# Patient Record
Sex: FEMALE | Race: WHITE | NOT HISPANIC OR LATINO | Employment: FULL TIME | URBAN - METROPOLITAN AREA
[De-identification: names, ages, dates, MRNs, and addresses within clinical notes are randomized per-mention and may not be internally consistent; named-entity substitution may affect disease eponyms.]

---

## 2018-01-12 ENCOUNTER — OFFICE VISIT (OUTPATIENT)
Dept: URGENT CARE | Facility: PHYSICIAN GROUP | Age: 43
End: 2018-01-12
Payer: COMMERCIAL

## 2018-01-12 VITALS
WEIGHT: 270 LBS | HEART RATE: 77 BPM | BODY MASS INDEX: 39.99 KG/M2 | DIASTOLIC BLOOD PRESSURE: 84 MMHG | RESPIRATION RATE: 16 BRPM | OXYGEN SATURATION: 95 % | SYSTOLIC BLOOD PRESSURE: 128 MMHG | HEIGHT: 69 IN | TEMPERATURE: 98.5 F

## 2018-01-12 DIAGNOSIS — J22 LOWER RESPIRATORY INFECTION (E.G., BRONCHITIS, PNEUMONIA, PNEUMONITIS, PULMONITIS): ICD-10-CM

## 2018-01-12 DIAGNOSIS — J11.1 INFLUENZA: ICD-10-CM

## 2018-01-12 PROCEDURE — 99214 OFFICE O/P EST MOD 30 MIN: CPT | Performed by: PHYSICIAN ASSISTANT

## 2018-01-12 RX ORDER — ALBUTEROL SULFATE 90 UG/1
2 AEROSOL, METERED RESPIRATORY (INHALATION) EVERY 4 HOURS PRN
Qty: 1 INHALER | Refills: 0 | Status: SHIPPED | OUTPATIENT
Start: 2018-01-12 | End: 2020-08-07

## 2018-01-12 RX ORDER — FLUCONAZOLE 150 MG/1
TABLET ORAL
Qty: 2 TAB | Refills: 1 | Status: SHIPPED | OUTPATIENT
Start: 2018-01-12 | End: 2020-08-07

## 2018-01-12 RX ORDER — BENZONATATE 100 MG/1
200 CAPSULE ORAL 3 TIMES DAILY PRN
Qty: 30 CAP | Refills: 0 | Status: SHIPPED | OUTPATIENT
Start: 2018-01-12 | End: 2018-01-26

## 2018-01-12 RX ORDER — AZITHROMYCIN 250 MG/1
TABLET, FILM COATED ORAL
Qty: 6 TAB | Refills: 0 | Status: SHIPPED | OUTPATIENT
Start: 2018-01-14 | End: 2018-01-18

## 2018-01-12 ASSESSMENT — ENCOUNTER SYMPTOMS
WHEEZING: 1
GASTROINTESTINAL NEGATIVE: 1
PSYCHIATRIC NEGATIVE: 1
FEVER: 1
HEADACHES: 1
BACK PAIN: 1
MYALGIAS: 1
SORE THROAT: 1
CARDIOVASCULAR NEGATIVE: 1
COUGH: 1
CHILLS: 1
EYES NEGATIVE: 1

## 2018-01-12 NOTE — PROGRESS NOTES
Subjective:      José Luis Tyler is a 42 y.o. female who presents with Cough (C/o slight productive cough, chest congestion, difficulty taking a deep breath/SOB, body aches x3 days)            HPI  Chief Complaint   Patient presents with   • Cough     C/o slight productive cough, chest congestion, difficulty taking a deep breath/SOB, body aches x3 days       HPI:  José Luis Tyler is a 42 y.o. Female who presents with flu like symptoms that started on Tuesday.  Light headed and then body aches.  Runny nose is clear.  Cough is productive.  Feeling wheezing and tight in the lungs.  Did get flu vaccine.  HAs tried nyquil and advil with improvement.  Did not take fever.  Lots of body aches and skin sensivity.  Patient denies  chest pain, palpitations, or n/v/d.    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  Past Medical History:   Diagnosis Date   • Allergy    • Anemia     with pregnancy   • Anxiety    • Arthritis    • Depression    • Headache(784.0)    • kid ston    • pac 2009    and PVCs   • Seizure (CMS-HCC)     Petit mal until puberty   • Urinary tract infection, site not specified        Past Surgical History:   Procedure Laterality Date   • LITHOTRIPSY  2004       Family History   Problem Relation Age of Onset   • Arthritis Mother      Rheumatoid arthritis   • Diabetes Father    • Hypertension Father    • Hyperlipidemia Father    • Hypertension Brother    • Hypertension Maternal Grandmother    • Cancer Maternal Grandfather      mesothelioma from asbestos   • Hypertension Paternal Grandmother    • Cancer Paternal Grandfather      bone   • Hypertension Paternal Grandfather      No pertinent family history.    Social History     Social History   • Marital status: Single     Spouse name: N/A   • Number of children: N/A   • Years of education: N/A     Occupational History   • Not on file.     Social History Main Topics   • Smoking status: Former Smoker     Years: 15.00     Types: Cigarettes   • Smokeless tobacco:  "Never Used      Comment: Socially twice a month now   • Alcohol use No   • Drug use: No   • Sexual activity: Yes     Partners: Male     Birth control/ protection: Rhythm     Other Topics Concern   • Not on file     Social History Narrative   • No narrative on file         Current Outpatient Prescriptions:   •  Pseudoeph-Doxylamine-DM-APAP (NYQUIL PO), Take  by mouth., 1/12/2018  •  ibuprofen, 600 mg, Oral, Q6HRS PRN, 1/12/2018  •  acetaminophen-codeine #3, 1-2 Tab, Oral, Q4HRS PRN, prn  •  fluconazole, 150 mg, Oral, DAILY  •  phenazopyridine, 200 mg, Oral, TID PRN  •  LORAZEPAM PO, Take  by mouth.  •  amoxicillin, 875 mg, Oral, BID  •  fluconazole, 150 mg, Oral, DAILY    Allergies   Allergen Reactions   • Other Drug      \"some epilepsy medications, unsure of name\"        Review of Systems   Constitutional: Positive for chills, fever and malaise/fatigue.   HENT: Positive for congestion and sore throat.    Eyes: Negative.    Respiratory: Positive for cough and wheezing.    Cardiovascular: Negative.    Gastrointestinal: Negative.    Genitourinary: Negative.    Musculoskeletal: Positive for back pain, joint pain and myalgias.   Skin: Negative.    Neurological: Positive for headaches.   Endo/Heme/Allergies: Negative.    Psychiatric/Behavioral: Negative.           Objective:     /84   Pulse 77   Temp 36.9 °C (98.5 °F)   Resp 16   Ht 1.753 m (5' 9\")   Wt 122.5 kg (270 lb)   SpO2 95%   BMI 39.87 kg/m²      Physical Exam       Nursing note and vital signs reviewed.    Constitutional:  Appropriately groomed, pleasant affect, well nourished, and in no acute distress.    HEENT:  Head: Atruamatic, normocephalic.    Ears:  EAC with mild cerumen bilaterally, not erythematous.  TM’s pearly gray with cone of light present and umbo and malleolus visible bilaterally.  No bulging or fluid bubbles present in middle ear.    Eyes:  PERRLA, EOM's full, sclera white, conjunctiva not erythematous, and medial canthus without " exudate bilaterally.    Nose:  Nares patent bilaterally.  Nasal mucosa edematous with clear rhinorrhea bilaterally.  Frontal and maxillary sinuses not tender to percussion.    Throat:  Oropharynx erythematous, with no enlargement of the palatine tonsils bilaterally with no exudates.    Posterior oropharynx with cobblestoning and clear to white post nasal drainage present.  Soft palate rises symmetrically bilaterally and uvula midline.  Neck supple, with mild proximal anterior cervical chain lymphadenopathy that is soft and mobile to palpation.      Lungs: Respiratory effort within normal limits. Lungs clear with expiratory wheezes to auscultation bilaterally. No crackles or rhonchi noted.     Heart:  Regular rate and rhythm without rubs, murmurs, or gallops. Dorsalis pedis and radial pulses 2+ bilaterally. No lower extremity edema.    Muscle skeletal:  Gait and station wnl, non antalgic.    Derm:  No lesions or rashes. Overall good turgor pressure.     Psychiatric:  Normal judgement, mood and affect.      Assessment/Plan:     1. Influenza  albuterol 108 (90 Base) MCG/ACT Aero Soln inhalation aerosol    benzonatate (TESSALON) 100 MG Cap   2. Lower respiratory infection (e.g., bronchitis, pneumonia, pneumonitis, pulmonitis)  azithromycin (ZITHROMAX) 250 MG Tab    fluconazole (DIFLUCAN) 150 MG tablet      Patient presents with suspected influenza is negative testing and treatment window. Now with worsening lower respiratory involvement. Patient with recent smoking cessation 3 months ago. On exam patient has coryza and erythematous oropharynx. Lungs with slight inspiratory wheezes to auscultation. Prescribed albuterol inhaler and Tessalon Perles. Azithromycin postdated for Sunday. Patient is high-risk for developing pneumonia. Recommended symptom support measures.    Patient was in agreement with this treatment plan and seemed to understand without barriers. All questions were encouraged and answered.  Reviewed signs  and symptoms of when to seek emergency medical care.     Please note that this dictation was created using voice recognition software.  I have made every reasonable attempt to correct obvious errors, but I expect there are errors of twan and possibly content that I did not discover before finalizing the note.

## 2020-03-09 ENCOUNTER — OFFICE VISIT (OUTPATIENT)
Dept: URGENT CARE | Facility: PHYSICIAN GROUP | Age: 45
End: 2020-03-09
Payer: COMMERCIAL

## 2020-03-09 VITALS
BODY MASS INDEX: 42.06 KG/M2 | TEMPERATURE: 97.6 F | HEIGHT: 69 IN | WEIGHT: 284 LBS | DIASTOLIC BLOOD PRESSURE: 86 MMHG | HEART RATE: 78 BPM | SYSTOLIC BLOOD PRESSURE: 134 MMHG | OXYGEN SATURATION: 98 %

## 2020-03-09 DIAGNOSIS — M54.32 SCIATICA OF LEFT SIDE: ICD-10-CM

## 2020-03-09 PROCEDURE — 99214 OFFICE O/P EST MOD 30 MIN: CPT | Performed by: PHYSICIAN ASSISTANT

## 2020-03-09 RX ORDER — KETOROLAC TROMETHAMINE 30 MG/ML
30 INJECTION, SOLUTION INTRAMUSCULAR; INTRAVENOUS ONCE
Status: COMPLETED | OUTPATIENT
Start: 2020-03-09 | End: 2020-03-09

## 2020-03-09 RX ORDER — METHYLPREDNISOLONE 4 MG/1
TABLET ORAL
Qty: 21 TAB | Refills: 0 | Status: SHIPPED | OUTPATIENT
Start: 2020-03-09 | End: 2020-08-07

## 2020-03-09 RX ORDER — CYCLOBENZAPRINE HCL 10 MG
10 TABLET ORAL
COMMUNITY
End: 2020-03-09

## 2020-03-09 RX ORDER — KETOROLAC TROMETHAMINE 30 MG/ML
30 INJECTION, SOLUTION INTRAMUSCULAR; INTRAVENOUS ONCE
Status: DISCONTINUED | OUTPATIENT
Start: 2020-03-09 | End: 2020-03-09

## 2020-03-09 RX ORDER — CYCLOBENZAPRINE HCL 5 MG
5-10 TABLET ORAL NIGHTLY PRN
Qty: 15 TAB | Refills: 0 | Status: SHIPPED
Start: 2020-03-09 | End: 2020-03-15

## 2020-03-09 RX ORDER — ACETAMINOPHEN 500 MG
500-1000 TABLET ORAL EVERY 6 HOURS PRN
COMMUNITY

## 2020-03-09 RX ADMIN — KETOROLAC TROMETHAMINE 30 MG: 30 INJECTION, SOLUTION INTRAMUSCULAR; INTRAVENOUS at 17:38

## 2020-03-09 ASSESSMENT — ENCOUNTER SYMPTOMS
PERIANAL NUMBNESS: 0
VOMITING: 0
BOWEL INCONTINENCE: 0
SHORTNESS OF BREATH: 0
HEADACHES: 0
NAUSEA: 0
NUMBNESS: 0
BACK PAIN: 1
COUGH: 0
SORE THROAT: 0
WEAKNESS: 0
EYE DISCHARGE: 0
TINGLING: 1
PARESTHESIAS: 0
FEVER: 0
EYE REDNESS: 0
LEG PAIN: 1

## 2020-03-09 NOTE — PROGRESS NOTES
Subjective:      José Luis Blackburn is a 44 y.o. female who presents with Back Pain (Sciatic, progressivly getting worse x 3 weeks, usually gets toradol for pain pt states)        The patient presents to clinic complaining of left-sided sciatica x3 weeks.  The patient reports a history of sciatica.    Back Pain   This is a new problem. Episode onset: x 3 weeks ago. The problem occurs constantly. The problem has been gradually worsening since onset. The pain is present in the sacro-iliac (left side). The pain radiates to the left knee. The pain is mild. The symptoms are aggravated by position. Associated symptoms include leg pain and tingling (intermittent tingling to left lower extremity.). Pertinent negatives include no bladder incontinence, bowel incontinence, chest pain, dysuria, fever, headaches, numbness, paresthesias, perianal numbness or weakness. She has tried NSAIDs, home exercises and muscle relaxant for the symptoms. The treatment provided mild relief.     The patient reports no recent injury or trauma to her back.     PMH:  has a past medical history of Allergy, Anemia, Anxiety, Arthritis, Depression, Headache(784.0), kid stokhadijah, pac (2009), Seizure (HCC), and Urinary tract infection, site not specified. She also has no past medical history of Addisons disease (HCC), Adrenal disorder (HCC), ASTHMA, Blood transfusion, Cancer (HCC), CATARACT, CHF (congestive heart failure) (HCC), Clotting disorder (HCC), COPD, Cushings syndrome (HCC), Diabetes, EMPHYSEMA, GERD (gastroesophageal reflux disease), Glaucoma, Goiter, Heart attack (HCC), Heart murmur, HIV (human immunodeficiency virus infection), Hyperlipidemia, Hypertension, IBD (inflammatory bowel disease), Meningitis, Migraine, Muscle disorder, Parathyroid disorder (HCC), Pituitary disease (HCC), Stroke (HCC), Substance abuse (HCC), Thyroid disease, Tuberculosis, or Ulcer.  MEDS:   Current Outpatient Medications:   •  acetaminophen (TYLENOL) 500 MG Tab,  "Take 500-1,000 mg by mouth every 6 hours as needed., Disp: , Rfl:   •  cyclobenzaprine (FLEXERIL) 10 MG Tab, Take 10 mg by mouth 1 time daily as needed. 1/2 pill at night, Disp: , Rfl:   •  LORAZEPAM PO, Take  by mouth., Disp: , Rfl:   •  ibuprofen (MOTRIN) 600 MG Tab, Take 600 mg by mouth every 6 hours as needed., Disp: , Rfl:   •  Pseudoeph-Doxylamine-DM-APAP (NYQUIL PO), Take  by mouth., Disp: , Rfl:   •  albuterol 108 (90 Base) MCG/ACT Aero Soln inhalation aerosol, Inhale 2 Puffs by mouth every four hours as needed for Shortness of Breath. (Patient not taking: Reported on 3/9/2020), Disp: 1 Inhaler, Rfl: 0  •  fluconazole (DIFLUCAN) 150 MG tablet, 1 tablet on day one, then 1 tablet PO on day 3 if no improvment (Patient not taking: Reported on 3/9/2020), Disp: 2 Tab, Rfl: 1  •  acetaminophen-codeine #3 (TYLENOL #3) 300-30 MG Tab, Take 1-2 Tabs by mouth every four hours as needed., Disp: , Rfl:   •  fluconazole (DIFLUCAN) 150 MG tablet, Take 1 Tab by mouth every day. (Patient not taking: Reported on 3/9/2020), Disp: 1 Tab, Rfl: 0  •  phenazopyridine (PYRIDIUM) 200 MG Tab, Take 1 Tab by mouth 3 times a day as needed. (Patient not taking: Reported on 3/9/2020), Disp: 6 Tab, Rfl: 0  •  amoxicillin (AMOXIL) 875 MG tablet, Take 1 Tab by mouth 2 times a day. (Patient not taking: Reported on 3/9/2020), Disp: 20 Tab, Rfl: 0  •  fluconazole (DIFLUCAN) 150 MG tablet, Take 1 Tab by mouth every day. (Patient not taking: Reported on 3/9/2020), Disp: 1 Tab, Rfl: 1  ALLERGIES:   Allergies   Allergen Reactions   • Other Drug      \"some epilepsy medications, unsure of name\"     SURGHX:   Past Surgical History:   Procedure Laterality Date   • LITHOTRIPSY  2004     SOCHX:  reports that she has quit smoking. Her smoking use included cigarettes. She quit after 15.00 years of use. She has never used smokeless tobacco. She reports that she does not drink alcohol or use drugs.  FH: Family history was reviewed, no pertinent findings to " "report    Review of Systems   Constitutional: Negative for fever.   HENT: Negative for congestion, ear pain and sore throat.    Eyes: Negative for discharge and redness.   Respiratory: Negative for cough and shortness of breath.    Cardiovascular: Negative for chest pain and leg swelling.   Gastrointestinal: Negative for bowel incontinence, nausea and vomiting.   Genitourinary: Negative for bladder incontinence and dysuria.   Musculoskeletal: Positive for back pain. Negative for joint pain.   Skin: Negative for rash.   Neurological: Positive for tingling (intermittent tingling to left lower extremity.). Negative for weakness, numbness, headaches and paresthesias.          Objective:     /86 (BP Location: Left arm, Patient Position: Sitting, BP Cuff Size: Adult)   Pulse 78   Temp 36.4 °C (97.6 °F) (Tympanic)   Ht 1.753 m (5' 9\")   Wt (!) 128.8 kg (284 lb)   SpO2 98%   BMI 41.94 kg/m²      Physical Exam  Constitutional:       General: She is not in acute distress.     Appearance: Normal appearance. She is well-developed. She is not ill-appearing.   HENT:      Head: Normocephalic and atraumatic.      Right Ear: External ear normal.      Left Ear: External ear normal.      Nose: Nose normal.   Eyes:      Extraocular Movements: Extraocular movements intact.      Conjunctiva/sclera: Conjunctivae normal.   Neck:      Musculoskeletal: Normal range of motion and neck supple.   Cardiovascular:      Rate and Rhythm: Normal rate.   Pulmonary:      Effort: Pulmonary effort is normal.   Musculoskeletal:      Comments:   Lumbar Spine:  Tenderness overlying the left SI joint.  No paraspinal muscle tenderness of the lumbar spine.  No midline/bony tenderness.  No palpable step-off  ROM intact  Neurovascular intact  Strength 5/5 -equal bilateral lower extremities  Normal gait   Skin:     General: Skin is warm and dry.   Neurological:      Mental Status: She is alert and oriented to person, place, and time.          "   Progress:  Toradol 30mg IM given in clinic.   The patient reports improvement of her symptoms after the Toradol injection.      Assessment/Plan:     1. Sciatica of left side  - ketorolac (TORADOL) injection 30 mg  - methylPREDNISolone (MEDROL DOSEPAK) 4 MG Tablet Therapy Pack; Follow schedule on package instructions.  Dispense: 21 Tab; Refill: 0  - cyclobenzaprine (FLEXERIL) 5 MG tablet; Take 1-2 Tabs by mouth at bedtime as needed.  Dispense: 15 Tab; Refill: 0  -- Advised the patient to only take this medication at night, as it may cause drowsiness. Instructed the patient not to take the medication while at work, driving, or operating machinery.  Instructed the patient not to drink alcohol while taking this medication.    Differential diagnoses, supportive care, and indications for immediate follow-up discussed with patient.   Instructed to return to clinic or nearest emergency department for any change in condition, further concerns, or worsening of symptoms.    OTC NSAIDs for pain/discomfort  -- Advised the patient to avoid excess NSAID use while taking the Medrol Dosepak. Recommend the patient take Tylenol for her sciatica.   Alternate ice and heat to the affected area for symptomatic relief  Home stretches as discussed in clinic  Follow-up with PCP  Return to clinic or go to the ED if symptoms worsen or fail to improve, or if the patient should develop worsening/increasing back pain, radiation of pain, numbness, tingling, or weakness to the lower extremities, incontinence of bladder/bowel, paresthesias, difficulty walking, fever/chills, and/or any concerning symptoms.     Discussed plan with the patient, and she agrees to the above.     Please note that this dictation was created using voice recognition software. I have made every reasonable attempt to correct obvious errors, but I expect that there may be errors of grammar and possibly content that I did not discover before finalizing the note.

## 2020-03-15 ENCOUNTER — HOSPITAL ENCOUNTER (EMERGENCY)
Facility: MEDICAL CENTER | Age: 45
End: 2020-03-15
Attending: EMERGENCY MEDICINE
Payer: COMMERCIAL

## 2020-03-15 VITALS
HEIGHT: 69 IN | RESPIRATION RATE: 18 BRPM | TEMPERATURE: 97.5 F | SYSTOLIC BLOOD PRESSURE: 161 MMHG | HEART RATE: 74 BPM | OXYGEN SATURATION: 94 % | WEIGHT: 270 LBS | DIASTOLIC BLOOD PRESSURE: 100 MMHG | BODY MASS INDEX: 39.99 KG/M2

## 2020-03-15 DIAGNOSIS — S39.012A STRAIN OF LUMBAR REGION, INITIAL ENCOUNTER: ICD-10-CM

## 2020-03-15 DIAGNOSIS — M54.32 SCIATICA OF LEFT SIDE: ICD-10-CM

## 2020-03-15 PROCEDURE — 99284 EMERGENCY DEPT VISIT MOD MDM: CPT

## 2020-03-15 PROCEDURE — 700111 HCHG RX REV CODE 636 W/ 250 OVERRIDE (IP): Performed by: EMERGENCY MEDICINE

## 2020-03-15 PROCEDURE — 96372 THER/PROPH/DIAG INJ SC/IM: CPT

## 2020-03-15 RX ORDER — ONDANSETRON 4 MG/1
4 TABLET, ORALLY DISINTEGRATING ORAL ONCE
Status: COMPLETED | OUTPATIENT
Start: 2020-03-15 | End: 2020-03-15

## 2020-03-15 RX ORDER — HYDROCODONE BITARTRATE AND ACETAMINOPHEN 5; 325 MG/1; MG/1
1 TABLET ORAL EVERY 6 HOURS PRN
Qty: 15 TAB | Refills: 0 | Status: SHIPPED | OUTPATIENT
Start: 2020-03-15 | End: 2020-03-20

## 2020-03-15 RX ORDER — KETOROLAC TROMETHAMINE 30 MG/ML
30 INJECTION, SOLUTION INTRAMUSCULAR; INTRAVENOUS ONCE
Status: COMPLETED | OUTPATIENT
Start: 2020-03-15 | End: 2020-03-15

## 2020-03-15 RX ORDER — CYCLOBENZAPRINE HCL 10 MG
10 TABLET ORAL 3 TIMES DAILY PRN
Qty: 30 TAB | Refills: 0 | Status: SHIPPED | OUTPATIENT
Start: 2020-03-15 | End: 2020-08-07

## 2020-03-15 RX ORDER — MORPHINE SULFATE 4 MG/ML
4 INJECTION, SOLUTION INTRAMUSCULAR; INTRAVENOUS ONCE
Status: COMPLETED | OUTPATIENT
Start: 2020-03-15 | End: 2020-03-15

## 2020-03-15 RX ADMIN — MORPHINE SULFATE 4 MG: 4 INJECTION INTRAVENOUS at 17:35

## 2020-03-15 RX ADMIN — ONDANSETRON 4 MG: 4 TABLET, ORALLY DISINTEGRATING ORAL at 17:35

## 2020-03-15 RX ADMIN — KETOROLAC TROMETHAMINE 30 MG: 30 INJECTION, SOLUTION INTRAMUSCULAR at 17:34

## 2020-03-15 ASSESSMENT — ENCOUNTER SYMPTOMS
SPEECH CHANGE: 0
SHORTNESS OF BREATH: 0
FEVER: 0
FOCAL WEAKNESS: 0
CHILLS: 0

## 2020-03-15 NOTE — ED TRIAGE NOTES
"Chief Complaint   Patient presents with   • Low Back Pain     reports hx of sciatica. pain x1 month.    • Leg Pain     Pt to triage for above. NAD noted. BP noted, reports she normally runs a little high but not that high.   Was seen at  6 days ago for same.     BP (!) 184/111   Pulse 96   Temp 36.4 °C (97.5 °F) (Temporal)   Resp 17   Ht 1.753 m (5' 9\")   Wt 122.5 kg (270 lb)   SpO2 96%   BMI 39.87 kg/m²     "

## 2020-03-16 ENCOUNTER — HOSPITAL ENCOUNTER (EMERGENCY)
Facility: MEDICAL CENTER | Age: 45
End: 2020-03-16
Attending: EMERGENCY MEDICINE
Payer: COMMERCIAL

## 2020-03-16 ENCOUNTER — APPOINTMENT (OUTPATIENT)
Dept: RADIOLOGY | Facility: MEDICAL CENTER | Age: 45
End: 2020-03-16
Attending: EMERGENCY MEDICINE
Payer: COMMERCIAL

## 2020-03-16 VITALS
TEMPERATURE: 97 F | HEIGHT: 65 IN | BODY MASS INDEX: 45 KG/M2 | OXYGEN SATURATION: 96 % | HEART RATE: 75 BPM | WEIGHT: 270.06 LBS | SYSTOLIC BLOOD PRESSURE: 141 MMHG | DIASTOLIC BLOOD PRESSURE: 92 MMHG | RESPIRATION RATE: 16 BRPM

## 2020-03-16 DIAGNOSIS — M54.32 SCIATICA OF LEFT SIDE: ICD-10-CM

## 2020-03-16 PROCEDURE — 96372 THER/PROPH/DIAG INJ SC/IM: CPT

## 2020-03-16 PROCEDURE — 72100 X-RAY EXAM L-S SPINE 2/3 VWS: CPT

## 2020-03-16 PROCEDURE — 700111 HCHG RX REV CODE 636 W/ 250 OVERRIDE (IP): Performed by: EMERGENCY MEDICINE

## 2020-03-16 PROCEDURE — 99284 EMERGENCY DEPT VISIT MOD MDM: CPT

## 2020-03-16 RX ORDER — KETOROLAC TROMETHAMINE 30 MG/ML
30 INJECTION, SOLUTION INTRAMUSCULAR; INTRAVENOUS ONCE
Status: COMPLETED | OUTPATIENT
Start: 2020-03-16 | End: 2020-03-16

## 2020-03-16 RX ORDER — PROCHLORPERAZINE MALEATE 10 MG
10 TABLET ORAL ONCE
Status: DISCONTINUED | OUTPATIENT
Start: 2020-03-16 | End: 2020-03-16 | Stop reason: HOSPADM

## 2020-03-16 RX ORDER — ONDANSETRON 4 MG/1
4 TABLET, ORALLY DISINTEGRATING ORAL ONCE
Status: DISCONTINUED | OUTPATIENT
Start: 2020-03-16 | End: 2020-03-16 | Stop reason: HOSPADM

## 2020-03-16 RX ORDER — PREDNISONE 20 MG/1
TABLET ORAL
Qty: 13 TAB | Refills: 0 | Status: SHIPPED | OUTPATIENT
Start: 2020-03-16 | End: 2020-08-07

## 2020-03-16 RX ORDER — ONDANSETRON 2 MG/ML
4 INJECTION INTRAMUSCULAR; INTRAVENOUS ONCE
Status: DISCONTINUED | OUTPATIENT
Start: 2020-03-16 | End: 2020-03-16 | Stop reason: HOSPADM

## 2020-03-16 RX ORDER — MORPHINE SULFATE 10 MG/ML
6 INJECTION, SOLUTION INTRAMUSCULAR; INTRAVENOUS ONCE
Status: DISCONTINUED | OUTPATIENT
Start: 2020-03-16 | End: 2020-03-16

## 2020-03-16 RX ORDER — MORPHINE SULFATE 10 MG/ML
6 INJECTION, SOLUTION INTRAMUSCULAR; INTRAVENOUS ONCE
Status: COMPLETED | OUTPATIENT
Start: 2020-03-16 | End: 2020-03-16

## 2020-03-16 RX ADMIN — KETOROLAC TROMETHAMINE 30 MG: 30 INJECTION, SOLUTION INTRAMUSCULAR at 13:28

## 2020-03-16 RX ADMIN — MORPHINE SULFATE 6 MG: 10 INJECTION INTRAVENOUS at 13:28

## 2020-03-16 NOTE — DISCHARGE INSTRUCTIONS
Take prednisone taper pack, Tylenol or Norco not both in your spasm medicine as needed.  Once you are taking 20 mg or less of prednisone you may add ibuprofen 600 mg 4 times a day.  Follow-up with Ortho if not improving in a week.  Return for fever, neurologic weakness, bowel or bladder issue or numbness of the perianal skin.

## 2020-03-16 NOTE — PROGRESS NOTES
Discharge summary completed with patient. Patient education completed regarding follow up, new medications, and returning to ED if symptoms worsen.

## 2020-03-16 NOTE — ED PROVIDER NOTES
ED Provider Note    Means of arrival: private vehicle  History obtained from: patient  History limited by: none    CHIEF COMPLAINT  Chief Complaint   Patient presents with   • Low Back Pain     reports hx of sciatica. pain x1 month.    • Leg Pain       HPI  José Luis Blackburn is a 44 y.o. female who presents to the Emergency Department for low back pain radiating down her left leg. Patient reports she has a long standing history of sciatica and reports that it has been flared up for the last month. She was trying motrin at home without relief and therefore she went to urgent care 6 days ago where she was started on medrol dose pack and flexeril. She was treated with Toradol at their facility. She reports that overall her pain had been improving however today it got acutely worse and flared up again therefore she came in for further evaluation.  She describes the pain as left-sided lower back radiating down her left leg, sharp/shooting and moderate in severity.  She has taken 2 Flexeril at home without relief of her pain.  She denies numbness, tingling, weakness, dysuria, urinary retention, bowel incontinence.  She denies any trauma to her family.    REVIEW OF SYSTEMS  Review of Systems   Constitutional: Negative for chills and fever.   Respiratory: Negative for shortness of breath.    Cardiovascular: Negative for chest pain.   Genitourinary: Negative for dysuria.        Negative for urinary retention   Neurological: Negative for speech change and focal weakness.   All other systems reviewed and are negative.        PAST MEDICAL HISTORY   has a past medical history of Allergy, Anemia, Anxiety, Arthritis, Depression, Headache(784.0), kid ston, pac (2009), Sciatica, Seizure (HCC), and Urinary tract infection, site not specified.    SURGICAL HISTORY   has a past surgical history that includes lithotripsy (2004).    SOCIAL HISTORY  Social History     Tobacco Use   • Smoking status: Former Smoker     Years: 15.00      "Types: Cigarettes   • Smokeless tobacco: Never Used   • Tobacco comment: Socially twice a month now   Substance Use Topics   • Alcohol use: No   • Drug use: No      Social History     Substance and Sexual Activity   Drug Use No       FAMILY HISTORY  Family History   Problem Relation Age of Onset   • Arthritis Mother         Rheumatoid arthritis   • Diabetes Father    • Hypertension Father    • Hyperlipidemia Father    • Hypertension Brother    • Hypertension Maternal Grandmother    • Cancer Maternal Grandfather         mesothelioma from asbestos   • Hypertension Paternal Grandmother    • Cancer Paternal Grandfather         bone   • Hypertension Paternal Grandfather        CURRENT MEDICATIONS  Home Medications     Reviewed by Robin Yarbrough R.N. (Registered Nurse) on 03/15/20 at 1631  Med List Status: Partial   Medication Last Dose Status   acetaminophen (TYLENOL) 500 MG Tab  Active   acetaminophen-codeine #3 (TYLENOL #3) 300-30 MG Tab  Active   albuterol 108 (90 Base) MCG/ACT Aero Soln inhalation aerosol  Active   amoxicillin (AMOXIL) 875 MG tablet  Active   cyclobenzaprine (FLEXERIL) 5 MG tablet  Active   fluconazole (DIFLUCAN) 150 MG tablet  Active   fluconazole (DIFLUCAN) 150 MG tablet  Active   fluconazole (DIFLUCAN) 150 MG tablet  Active   ibuprofen (MOTRIN) 600 MG Tab  Active   LORAZEPAM PO  Active   methylPREDNISolone (MEDROL DOSEPAK) 4 MG Tablet Therapy Pack  Active   phenazopyridine (PYRIDIUM) 200 MG Tab  Active   Pseudoeph-Doxylamine-DM-APAP (NYQUIL PO)  Active                ALLERGIES  Allergies   Allergen Reactions   • Other Drug      \"some epilepsy medications, unsure of name\"       PHYSICAL EXAM  VITAL SIGNS: BP (!) 184/111   Pulse 96   Temp 36.4 °C (97.5 °F) (Temporal)   Resp 17   Ht 1.753 m (5' 9\")   Wt 122.5 kg (270 lb)   SpO2 96%   BMI 39.87 kg/m²   Vitals reviewed by myself.  Physical Exam   Constitutional: She is well-developed, well-nourished, and in no distress. No distress.   HENT: "   Head: Normocephalic and atraumatic.   Eyes: EOM are normal.   Neck: Normal range of motion. Neck supple.   Cardiovascular: Normal rate, regular rhythm and normal heart sounds.   Pulmonary/Chest: Effort normal and breath sounds normal. No respiratory distress. She has no wheezes. She has no rales.   Musculoskeletal:      Comments: No midline spinal tenderness, strength is 5/5 in all extremities, negative straight leg raise bilaterally.   Neurological:   Sensation intact in all extremities         DIAGNOSTIC STUDIES /  LABS  none    COURSE & MEDICAL DECISION MAKING  Nursing notes, VS, PMSFHx reviewed in chart.    Patient is a 44-year-old female who comes in for evaluation of back pain shooting down her leg.  Differential diagnosis includes radiculopathy, sciatica, muscle sprain, muscle spasm.  Patient has no red flag signs or symptoms concerning for spinal cord impingement and therefore I do not believe spinal imaging is indicated at this time.  She is neurovascularly intact.  Patient's vital signs are notable for slight hypertension likely related to pain.  Patient is treated with morphine, Toradol and Zofran.  After treatment patient feels greatly improved.  She is Isacc taken Flexeril prior to arrival.  As patient felt improved after narcotics and has been feeling treatment with steroids, anti-inflammatories, Tylenol and muscle relaxants outpatient I will prescribe her Norco for further management of her pain.  Patient is agreeable to this plan.  She is advised to follow-up with PCP for possible physical therapy referral.  She is then given strict return precautions and discharged in stable condition.      In prescribing controlled substances to this patient, I certify that I have obtained and reviewed the medical history of José Luis Blackburn. I have also made a good kirk effort to obtain applicable records from other providers who have treated the patient and demonstrated no increased risk of substance  abuse.     I have conducted a physical exam and documented it. I have reviewed Ms. Blackburn’s prescription history as maintained by the [place] Prescription Monitoring Program.     I have assessed the patient’s risk for abuse, dependency, and addiction using the validated Opioid Risk Tool available at https://www.mdcalc.com/ouuovo-mlzt-eybw-ort-narcotic-abuse.     Given the above, I believe the benefits of controlled substance therapy outweigh the risks. The reasons for prescribing controlled substances include in my professional opinion, controlled substances are the only reasonable choice for this patient because due to failure of nonnarcotic therapy. Accordingly, I have discussed the risk and benefits, treatment plan, and alternative therapies with the patient.         FINAL IMPRESSION  1. Sciatica of left side    2. Strain of lumbar region, initial encounter

## 2020-03-16 NOTE — ED NOTES
Patient refused compazine.  Seen by ERP for re-eval.  Given discharge instructions, follow up information, and prescription x 1, verbalized understanding, discharged to .

## 2020-03-16 NOTE — ED PROVIDER NOTES
ED Provider Note    CHIEF COMPLAINT  Chief Complaint   Patient presents with   • Leg Pain   • Low Back Pain   • T-5000 GLF       HPI  José Luis Blackburn is a 44 y.o. female who presents with 4 weeks of left buttock pain radiating to the left calf.  Severity 10 of 10 today and she collapsed trying to get up out of bed and kind of slid down.  She is never injured herself.  This pain followed training on an elliptical .  She is been to urgent care and took a Medrol pack which was temporarily of good benefit until her pain returned after finishing the taper.  She was here yesterday and received Norco and Flexeril as well as morphine and Toradol here.  History of prior sciatica but never this bad.  Denies weakness, numbness, saddle anesthesia, bowel or bladder issues.  No fever injection medication use.  No abdominal pain.  No prior fractures or disc herniation.  No imaging per chart review.    REVIEW OF SYSTEMS  Pertinent positives include: Severe left buttock pain radiating to left calf.  Pertinent negatives include: Weakness, fever, abdominal pain, fall.    PAST MEDICAL HISTORY  Past Medical History:   Diagnosis Date   • Allergy    • Anemia     with pregnancy   • Anxiety    • Arthritis    • Depression    • Headache(784.0)    • kid ston    • pac 2009    and PVCs   • Sciatica    • Seizure (HCC)     Petit mal until puberty   • Urinary tract infection, site not specified        SOCIAL HISTORY  Lives in [facility] in [place].    SURGICAL HISTORY  Past Surgical History:   Procedure Laterality Date   • LITHOTRIPSY  2004       CURRENT MEDICATIONS    Current Facility-Administered Medications:   •  ketorolac (TORADOL) injection 30 mg, 30 mg, Intramuscular, Once, Felipe Miranda M.D.  •  ondansetron (ZOFRAN) syringe/vial injection 4 mg, 4 mg, Intramuscular, Once, Felipe Miranda M.D.  •  morphine (pf) 10 mg/mL injection 6 mg, 6 mg, Intramuscular, Once, Felipe Miranda M.D.    Current Outpatient Medications:   •  predniSONE  (DELTASONE) 20 MG Tab, 3 tabs daily for 2 days, 2 tabs daily for 2 days, 1 tab daily for 2 days, 1/2 tab daily for 2 days, Disp: 13 Tab, Rfl: 0  •  HYDROcodone-acetaminophen (NORCO) 5-325 MG Tab per tablet, Take 1 Tab by mouth every 6 hours as needed for up to 5 days., Disp: 15 Tab, Rfl: 0  •  cyclobenzaprine (FLEXERIL) 10 MG Tab, Take 1 Tab by mouth 3 times a day as needed., Disp: 30 Tab, Rfl: 0  •  acetaminophen (TYLENOL) 500 MG Tab, Take 500-1,000 mg by mouth every 6 hours as needed., Disp: , Rfl:   •  methylPREDNISolone (MEDROL DOSEPAK) 4 MG Tablet Therapy Pack, Follow schedule on package instructions., Disp: 21 Tab, Rfl: 0  •  Pseudoeph-Doxylamine-DM-APAP (NYQUIL PO), Take  by mouth., Disp: , Rfl:   •  albuterol 108 (90 Base) MCG/ACT Aero Soln inhalation aerosol, Inhale 2 Puffs by mouth every four hours as needed for Shortness of Breath. (Patient not taking: Reported on 3/9/2020), Disp: 1 Inhaler, Rfl: 0  •  fluconazole (DIFLUCAN) 150 MG tablet, 1 tablet on day one, then 1 tablet PO on day 3 if no improvment (Patient not taking: Reported on 3/9/2020), Disp: 2 Tab, Rfl: 1  •  acetaminophen-codeine #3 (TYLENOL #3) 300-30 MG Tab, Take 1-2 Tabs by mouth every four hours as needed., Disp: , Rfl:   •  fluconazole (DIFLUCAN) 150 MG tablet, Take 1 Tab by mouth every day. (Patient not taking: Reported on 3/9/2020), Disp: 1 Tab, Rfl: 0  •  phenazopyridine (PYRIDIUM) 200 MG Tab, Take 1 Tab by mouth 3 times a day as needed. (Patient not taking: Reported on 3/9/2020), Disp: 6 Tab, Rfl: 0  •  LORAZEPAM PO, Take  by mouth., Disp: , Rfl:   •  ibuprofen (MOTRIN) 600 MG Tab, Take 600 mg by mouth every 6 hours as needed., Disp: , Rfl:   •  amoxicillin (AMOXIL) 875 MG tablet, Take 1 Tab by mouth 2 times a day. (Patient not taking: Reported on 3/9/2020), Disp: 20 Tab, Rfl: 0  •  fluconazole (DIFLUCAN) 150 MG tablet, Take 1 Tab by mouth every day. (Patient not taking: Reported on 3/9/2020), Disp: 1 Tab, Rfl:  "1      ALLERGIES  Allergies   Allergen Reactions   • Other Drug      \"some epilepsy medications, unsure of name\"       PHYSICAL EXAM  VITAL SIGNS: BP (!) 164/104   Pulse 94   Temp 36.1 °C (97 °F) (Temporal)   Resp 18   Ht 1.651 m (5' 5\")   Wt 122.5 kg (270 lb 1 oz)   SpO2 97%   BMI 44.94 kg/m² Hypertensive, afebrile  Constitutional: Well developed, Well nourished.  Moderately overweight  Respiratory: Rate and excursion are normal.   Gastrointestinal: Soft, No tenderness, No masses, No pulsatile masses.   Genitourinary: No costovertebral angle tenderness.  Skin: Warm, Dry, No erythema, No rash.   Back: No midline spinal tenderness.   Musculoskeletal: Hip rotation normal. Straight leg raise positive on left. Limbs atraumatic.  Vascular: No edema, No cyanosis.  Neurologic: Alert & oriented x 3, Extensor hallicus longus and ankle plantar flexion 5/5 bilateral, Sensation intact to light touch in both legs.  DTR's symmetric patellar and achilles 0-1 bilateral.  No focal deficits noted.     DIFFERENTIAL:  Sciatica, doubt cauda equina syndrome, doubt spinal stenosis, disc disease, doubt fracture    RADIOLOGY/PROCEDURES: Imaging studies ordered since this was this patient's third visit for back pain.  DX-LUMBAR SPINE-2 OR 3 VIEWS   Final Result      No significant spondylosis. No acute fracture or listhesis.          INTERVENTIONS:  prochlorperazine (COMPAZINE) tablet 10 mg (10 mg Oral Refused 3/16/20 1440)   ketorolac (TORADOL) injection 30 mg (30 mg Intramuscular Given 3/16/20 1328)   morphine (pf) 10 mg/mL injection 6 mg (6 mg Intramuscular Given 3/16/20 1328)     Response: Improved pain, nausea    COURSE & MEDICAL DECISION MAKING:  This patient presents with back pain that I think is sciatica.  There is no evidence of significant disc injury, fracture, pathologic lesion or significant arthritis.  I doubt there is epidural abscess, spinal stenosis or cauda equina syndrome.  At this point MRI not " indicated..    Patient had an elevated blood pressure reading and was advised to followup with a primary physician for comprehensive blood pressure evaluation.    PLAN:  Discharge Medication List as of 3/16/2020  2:31 PM      START taking these medications    Details   predniSONE (DELTASONE) 20 MG Tab 3 tabs daily for 2 days, 2 tabs daily for 2 days, 1 tab daily for 2 days, 1/2 tab daily for 2 days, Disp-13 Tab, R-0, Print Rx Paper           Ibuprofen as terminate prednisone  Continue Flexeril and Tylenol  Sciatica handout given  Return for weakness, fever, bowel or bladder issue    [facility] if not better in 10 to 14 days    CONDITION: stable and good.    FINAL DIAGNOSIS:  1. Sciatica of left side        Electronically signed by: Felipe Mrianda M.D., 3/16/2020 1:08 PM

## 2020-03-16 NOTE — ED TRIAGE NOTES
Pt to triage, states she was seen here lastnight, treated and diagnosed with sciatica. States her leg gave out this morning due to pain and she fell , c/o continued pain

## 2020-08-07 ENCOUNTER — OFFICE VISIT (OUTPATIENT)
Dept: URGENT CARE | Facility: PHYSICIAN GROUP | Age: 45
End: 2020-08-07
Payer: COMMERCIAL

## 2020-08-07 VITALS
WEIGHT: 270 LBS | HEIGHT: 69 IN | DIASTOLIC BLOOD PRESSURE: 104 MMHG | OXYGEN SATURATION: 96 % | RESPIRATION RATE: 18 BRPM | SYSTOLIC BLOOD PRESSURE: 160 MMHG | TEMPERATURE: 98.5 F | BODY MASS INDEX: 39.99 KG/M2 | HEART RATE: 78 BPM

## 2020-08-07 DIAGNOSIS — M54.16 RADICULOPATHY, LUMBAR REGION: ICD-10-CM

## 2020-08-07 PROCEDURE — 99213 OFFICE O/P EST LOW 20 MIN: CPT | Performed by: FAMILY MEDICINE

## 2020-08-07 RX ORDER — CYCLOBENZAPRINE HCL 10 MG
10 TABLET ORAL 3 TIMES DAILY PRN
Qty: 30 TAB | Refills: 0 | Status: SHIPPED | OUTPATIENT
Start: 2020-08-07 | End: 2020-10-01

## 2020-08-07 RX ORDER — PREDNISONE 20 MG/1
TABLET ORAL
Qty: 11 TAB | Refills: 0 | Status: SHIPPED | OUTPATIENT
Start: 2020-08-07 | End: 2020-10-01

## 2020-08-07 ASSESSMENT — ENCOUNTER SYMPTOMS
BACK PAIN: 1
VOMITING: 0
SORE THROAT: 0
FEVER: 0
SHORTNESS OF BREATH: 0
HEADACHES: 0

## 2020-08-07 NOTE — PROGRESS NOTES
Subjective:     José Luis Blackburn is a 44 y.o. female who presents for Back Pain (sciatic pain, x friday and getting worse )    HPI  Pt presents for evaluation of a recurrent problem   Pt with low back pain for the past 1 week   Pain started without fall or injury   Pain is radiating down the left leg   Pain is constant and worsening   Pain is worse with movement   Pt completed physical therapy for this which was helpful     Review of Systems   Constitutional: Negative for fever.   HENT: Negative for sore throat.    Respiratory: Negative for shortness of breath.    Cardiovascular: Negative for chest pain.   Gastrointestinal: Negative for vomiting.   Musculoskeletal: Positive for back pain.   Skin: Negative for rash.   Neurological: Negative for headaches.     PMH:  has a past medical history of Allergy, Anemia, Anxiety, Arthritis, Depression, Headache(784.0), kid ston, pac (2009), Sciatica, Seizure (HCC), and Urinary tract infection, site not specified. She also has no past medical history of Addisons disease (HCC), Adrenal disorder (HCC), ASTHMA, Blood transfusion, Cancer (HCC), CATARACT, CHF (congestive heart failure) (HCC), Clotting disorder (HCC), COPD, Cushings syndrome (HCC), Diabetes, EMPHYSEMA, GERD (gastroesophageal reflux disease), Glaucoma, Goiter, Heart attack (HCC), Heart murmur, HIV (human immunodeficiency virus infection), Hyperlipidemia, Hypertension, IBD (inflammatory bowel disease), Meningitis, Migraine, Muscle disorder, Parathyroid disorder (HCC), Pituitary disease (HCC), Stroke (HCC), Substance abuse (HCC), Thyroid disease, Tuberculosis, or Ulcer.  MEDS:   Current Outpatient Medications:   •  acetaminophen (TYLENOL) 500 MG Tab, Take 500-1,000 mg by mouth every 6 hours as needed., Disp: , Rfl:   •  LORAZEPAM PO, Take  by mouth., Disp: , Rfl:   •  ibuprofen (MOTRIN) 600 MG Tab, Take 600 mg by mouth every 6 hours as needed., Disp: , Rfl:   •  predniSONE (DELTASONE) 20 MG Tab, 3 tabs daily for 2  "days, 2 tabs daily for 2 days, 1 tab daily for 2 days, 1/2 tab daily for 2 days (Patient not taking: Reported on 8/7/2020), Disp: 13 Tab, Rfl: 0  •  cyclobenzaprine (FLEXERIL) 10 MG Tab, Take 1 Tab by mouth 3 times a day as needed. (Patient not taking: Reported on 8/7/2020), Disp: 30 Tab, Rfl: 0  •  methylPREDNISolone (MEDROL DOSEPAK) 4 MG Tablet Therapy Pack, Follow schedule on package instructions. (Patient not taking: Reported on 8/7/2020), Disp: 21 Tab, Rfl: 0  •  Pseudoeph-Doxylamine-DM-APAP (NYQUIL PO), Take  by mouth., Disp: , Rfl:   •  albuterol 108 (90 Base) MCG/ACT Aero Soln inhalation aerosol, Inhale 2 Puffs by mouth every four hours as needed for Shortness of Breath. (Patient not taking: Reported on 3/9/2020), Disp: 1 Inhaler, Rfl: 0  •  fluconazole (DIFLUCAN) 150 MG tablet, 1 tablet on day one, then 1 tablet PO on day 3 if no improvment (Patient not taking: Reported on 3/9/2020), Disp: 2 Tab, Rfl: 1  •  acetaminophen-codeine #3 (TYLENOL #3) 300-30 MG Tab, Take 1-2 Tabs by mouth every four hours as needed., Disp: , Rfl:   •  fluconazole (DIFLUCAN) 150 MG tablet, Take 1 Tab by mouth every day. (Patient not taking: Reported on 3/9/2020), Disp: 1 Tab, Rfl: 0  •  phenazopyridine (PYRIDIUM) 200 MG Tab, Take 1 Tab by mouth 3 times a day as needed. (Patient not taking: Reported on 3/9/2020), Disp: 6 Tab, Rfl: 0  •  amoxicillin (AMOXIL) 875 MG tablet, Take 1 Tab by mouth 2 times a day. (Patient not taking: Reported on 3/9/2020), Disp: 20 Tab, Rfl: 0  •  fluconazole (DIFLUCAN) 150 MG tablet, Take 1 Tab by mouth every day. (Patient not taking: Reported on 3/9/2020), Disp: 1 Tab, Rfl: 1  ALLERGIES:   Allergies   Allergen Reactions   • Other Drug      \"some epilepsy medications, unsure of name\"     SURGHX:   Past Surgical History:   Procedure Laterality Date   • LITHOTRIPSY  2004     SOCHX:  reports that she has quit smoking. Her smoking use included cigarettes. She quit after 15.00 years of use. She has never used " "smokeless tobacco. She reports that she does not drink alcohol or use drugs.  FH: Family history was reviewed, not contributing to acute pain      Objective:   /104 (BP Location: Right arm, Patient Position: Sitting, BP Cuff Size: Adult long)   Pulse 78   Temp 36.9 °C (98.5 °F) (Tympanic)   Resp 18   Ht 1.753 m (5' 9\")   Wt 122.5 kg (270 lb)   SpO2 96%   Breastfeeding No   BMI 39.87 kg/m²     Physical Exam  Constitutional:       General: She is not in acute distress.     Appearance: She is well-developed. She is not diaphoretic.   HENT:      Head: Normocephalic and atraumatic.   Musculoskeletal:      Comments: Back:  General: No asymmetry, bruising, or erythema appreciated  ROM: Limited by pain   Palpation: No tenderness to palpation of spinous processes, no step-offs appreciated, +TTP along left SI joint and glute max, no significant scoliosis appreciated  Strength: 5/5 hip flexion/extension  Neuro: Sensation intact and equal bilaterally in LE's   Skin:     General: Skin is warm and dry.      Findings: No erythema.   Neurological:      Mental Status: She is alert and oriented to person, place, and time.      Sensory: No sensory deficit.   Psychiatric:         Mood and Affect: Mood normal.         Behavior: Behavior normal.         Thought Content: Thought content normal.         Judgment: Judgment normal.         Assessment/Plan:   Assessment    1. Radiculopathy, lumbar region  - predniSONE (DELTASONE) 20 MG Tab; Take 2 tabs (40mg) daily x 3 days, then take 1 tab (20mg) daily x 3 days, then take 1/2 tab (10mg) daily x 4 days  Dispense: 11 Tab; Refill: 0  - REFERRAL TO PHYSIATRY (PMR)  - cyclobenzaprine (FLEXERIL) 10 MG Tab; Take 1 Tab by mouth 3 times a day as needed.  Dispense: 30 Tab; Refill: 0    Patient is a 44-year-old female with recurrent radiculopathy.  May be lumbar, however suspicious of possible piriformis syndrome.  Has already completed a course of physical therapy which did help, however " has recurred again.  At this point, her pain is more consistent with piriformis syndrome, and does not have a whole lot of tenderness in the lumbar muscles.  Patient will be referred to physiatry to discuss other options on top of physical therapy to help fully recover and prevent recurrences.

## 2020-09-29 NOTE — PROGRESS NOTES
New patient note    Physiatry (physical medicine and  Rehabilitation), interventional spine and sports medicine    Date of service: See epic    Chief complaint:   Chief Complaint   Patient presents with   • New Patient     Back Pain        Referring provider: Luciano Still M    HISTORY    HPI: José Luis Blackburn 44 y.o.  who presents today with Diagnoses of Lumbar radiculitis, Chronic left-sided low back pain with left-sided sciatica, Left leg weakness, and Impaired mobility and ADLs were pertinent to this visit.    Back Pain  Chronicity: acute on chronic. Episode onset: present for years with an acute flare in march 2020 and has severe pain since then. she states the pain has been equal to child birth and kidney stones.  The problem occurs constantly. The problem has been gradually worsening since onset. The pain is present in the gluteal, lumbar spine and sacro-iliac. The pain radiates to the left knee, left thigh and left foot. The pain is at a severity of 9/10. The symptoms are aggravated by sitting, bending, position and twisting. Associated symptoms include leg pain, numbness and tingling. She has tried analgesics, heat, home exercises, ice, muscle relaxant, NSAIDs and walking (physical therapy including a home exercise program for the past two months) for the symptoms. The treatment provided no relief.     Difficulty with ADLs including lower body dressing, doing the dishes.        Medical records review:  I reviewed the note from the referring provider Luciano Still M* including the note dated 8/7/2020 diagnosed with a lumbar radiculopathy, given NSAIDs, flexeril, oral prednisone, referred to physiatry.       ROS:   Red Flags ROS:   Fever, Chills, Sweats: Denies  Involuntary Weight Loss: Denies  Bladder Incontinence: Denies  Bowel Incontinence: denies  Saddle Anesthesia: Denies    All other systems reviewed and negative.       PMHx:   Past Medical History:   Diagnosis Date   • Allergy     • Anemia     with pregnancy   • Anxiety    • Arthritis    • Depression    • Headache(784.0)    • kid ston    • pac 2009    and PVCs   • Sciatica    • Seizure (HCC)     Petit mal until puberty   • Urinary tract infection, site not specified          Current Outpatient Medications on File Prior to Visit   Medication Sig Dispense Refill   • acetaminophen (TYLENOL) 500 MG Tab Take 500-1,000 mg by mouth every 6 hours as needed.     • acetaminophen-codeine #3 (TYLENOL #3) 300-30 MG Tab Take 1-2 Tabs by mouth every four hours as needed.     • LORAZEPAM PO Take  by mouth.     • ibuprofen (MOTRIN) 600 MG Tab Take 600 mg by mouth every 6 hours as needed.     • Pseudoeph-Doxylamine-DM-APAP (NYQUIL PO) Take  by mouth.     • phenazopyridine (PYRIDIUM) 200 MG Tab Take 1 Tab by mouth 3 times a day as needed. (Patient not taking: Reported on 10/1/2020) 6 Tab 0     No current facility-administered medications on file prior to visit.         PSHx:   Past Surgical History:   Procedure Laterality Date   • LITHOTRIPSY  2004       Family history   Family History   Problem Relation Age of Onset   • Arthritis Mother         Rheumatoid arthritis   • Diabetes Father    • Hypertension Father    • Hyperlipidemia Father    • Hypertension Brother    • Hypertension Maternal Grandmother    • Cancer Maternal Grandfather         mesothelioma from asbestos   • Hypertension Paternal Grandmother    • Cancer Paternal Grandfather         bone   • Hypertension Paternal Grandfather          Medications: reviewed on epic.   Outpatient Medications Marked as Taking for the 10/1/20 encounter (Office Visit) with Bro Mojica M.D.   Medication Sig Dispense Refill   • cyclobenzaprine (FLEXERIL) 10 MG Tab Take 1 Tab by mouth 3 times a day as needed. 90 Tab 3   • acetaminophen (TYLENOL) 500 MG Tab Take 500-1,000 mg by mouth every 6 hours as needed.     • acetaminophen-codeine #3 (TYLENOL #3) 300-30 MG Tab Take 1-2 Tabs by mouth every four hours as needed.    "  • LORAZEPAM PO Take  by mouth.     • ibuprofen (MOTRIN) 600 MG Tab Take 600 mg by mouth every 6 hours as needed.          Allergies:   Allergies   Allergen Reactions   • Gabapentin      Cognitive side effects    • Other Drug      \"some epilepsy medications, unsure of name\"       Social Hx:   Social History     Socioeconomic History   • Marital status: Single     Spouse name: Not on file   • Number of children: Not on file   • Years of education: Not on file   • Highest education level: Not on file   Occupational History   • Not on file   Social Needs   • Financial resource strain: Not on file   • Food insecurity     Worry: Not on file     Inability: Not on file   • Transportation needs     Medical: Not on file     Non-medical: Not on file   Tobacco Use   • Smoking status: Former Smoker     Years: 15.00     Types: Cigarettes   • Smokeless tobacco: Never Used   • Tobacco comment: Socially twice a month now   Substance and Sexual Activity   • Alcohol use: No   • Drug use: No   • Sexual activity: Yes     Partners: Male     Birth control/protection: Rhythm   Lifestyle   • Physical activity     Days per week: Not on file     Minutes per session: Not on file   • Stress: Not on file   Relationships   • Social connections     Talks on phone: Not on file     Gets together: Not on file     Attends Congregational service: Not on file     Active member of club or organization: Not on file     Attends meetings of clubs or organizations: Not on file     Relationship status: Not on file   • Intimate partner violence     Fear of current or ex partner: Not on file     Emotionally abused: Not on file     Physically abused: Not on file     Forced sexual activity: Not on file   Other Topics Concern   •  Service No   • Blood Transfusions No   • Caffeine Concern No   • Occupational Exposure No   • Hobby Hazards No   • Sleep Concern Yes   • Stress Concern No   • Weight Concern Yes   • Special Diet No   • Back Care Yes   • Exercise No " "  • Bike Helmet No   • Seat Belt Yes   • Self-Exams Yes   Social History Narrative   • Not on file         EXAMINATION     Physical Exam:   Vitals: /80 (BP Location: Left arm, Patient Position: Sitting, BP Cuff Size: Adult)   Pulse 75   Temp 36.4 °C (97.6 °F) (Temporal)   Ht 1.753 m (5' 9\")   Wt (!) 133.4 kg (294 lb 1.5 oz)   SpO2 100%     Constitutional:   Body Habitus: Body mass index is 43.43 kg/m².  Cooperation: Fully cooperates with exam  Appearance: Well-groomed, well-nourished, not disheveled     Eyes: No scleral icterus to suggest severe liver disease, no proptosis to suggest severe hyperthyroid    ENT -no obvious auditory deficits, no obvious tongue lesions, tongue midline, no facial droop     Skin -no rashes or lesions noted     Respiratory-  breathing comfortable on room air, no audible wheezing    Cardiovascular- capillary refills less than 2 seconds. No lower extremity edema is noted.     Gastrointestinal - no obvious abdominal masses, No tenderness to palpation in the abdomen    Psychiatric- alert and oriented ×3. Normal affect.     Gait - normal gait, no use of ambulatory device, nonantalgic.     Musculoskeletal and Neuro -       Thoracic/Lumbar Spine/Sacral Spine/Hips   Inspection: No evidence of atrophy in bilateral lower extremities throughout     ROM: decreased active range of motion with flexion, lateral flexion, and rotation bilaterally.   There is decreased active range of motion with lumbar extension with pain.      Palpation:   No tenderness to palpation in midline at T1-T12 levels. No tenderness to palpation in the left and right of the midline T1-L5, NEGATIVE for tenderness to palpation to the para-midline region in the lower lumbar levels.  palpation over SI joint: negative bilaterally    palpation in hip or over the gluteus medius tendon insertion: negative bilaterally      Lumbar spine Special tests  Neuro tension  Straight leg test negative right, positive left    Slump test " negative right, positive left      HIP  FAIR test negative bilaterally    Range of motion in the RIGHT hip is full  in flexion, extension, abduction, internal rotation, external rotation.  Range of motion in the LEFT hip is full  in flexion, extension, abduction, internal rotation, external rotation.      SI joint tests  Observation patient sits on one buttocks: Negative  SI joint compression negative bilaterally    SI joint distraction negative bilaterally    Thigh thrust test negative bilaterally    EARL test negative bilaterally                 Key points for the international standards for neurological classification of spinal cord injury (ISNCSCI) to light touch.     Dermatome R L                                      L2 2 2   L3 2 2   L4 2 2   L5 2 2   S1 2 2   S2 2 2       Motor Exam Lower Extremities    ? Myotome R L   Hip flexion L2 5 5   Knee extension L3 5 5   Ankle dorsiflexion L4 5 5   Toe extension L5 5 5   Ankle plantarflexion S1 5 5         Reflexes  ?  R L                Patella  2+ 2+   Achilles   2+ 2+       Babinski sign negative bilaterally   Clonus of the ankle negative bilaterally       MEDICAL DECISION MAKING    Medical records review: see under HPI section.     DATA    Labs:   No results found for: SODIUM, POTASSIUM, CHLORIDE, CO2, ANION, GLUCOSE, BUN, CREATININE, CALCIUM, ASTSGOT, ALTSGPT, TBILIRUBIN, ALBUMIN, TOTPROTEIN, GLOBULIN, AGRATIO]    No results found for: PROTHROMBTM, INR     No results found for: WBC, RBC, HEMOGLOBIN, HEMATOCRIT, MCV, MCH, MCHC, MPV, NEUTSPOLYS, LYMPHOCYTES, MONOCYTES, EOSINOPHILS, BASOPHILS, HYPOCHROMIA, ANISOCYTOSIS     No results found for: HBA1C     Imaging:   I personally reviewed following images, these are my reads  X-ray lumbar spine 3/16/2020  There are no acute findings.  Essentially normal study.      IMAGING radiology reads. I reviewed the following radiology reads                                                             Results for orders placed  during the hospital encounter of 03/16/20   DX-LUMBAR SPINE-2 OR 3 VIEWS    Impression No significant spondylosis. No acute fracture or listhesis.                                           Diagnosis   Visit Diagnoses     ICD-10-CM   1. Lumbar radiculitis  M54.16   2. Chronic left-sided low back pain with left-sided sciatica  M54.42    G89.29   3. Left leg weakness  R29.898   4. Impaired mobility and ADLs  Z74.09    Z78.9           ASSESSMENT AND PLAN:  José Luis Brizuelayahaira 44 y.o. female      José Luis Marinelli was seen today for new patient.    Diagnoses and all orders for this visit:    Lumbar radiculitis  -     MR-LUMBAR SPINE-W/O; Future  -     cyclobenzaprine (FLEXERIL) 10 MG Tab; Take 1 Tab by mouth 3 times a day as needed.    Chronic left-sided low back pain with left-sided sciatica  -     MR-LUMBAR SPINE-W/O; Future  -     cyclobenzaprine (FLEXERIL) 10 MG Tab; Take 1 Tab by mouth 3 times a day as needed.    Left leg weakness  -     MR-LUMBAR SPINE-W/O; Future  -     cyclobenzaprine (FLEXERIL) 10 MG Tab; Take 1 Tab by mouth 3 times a day as needed.    Impaired mobility and ADLs  -     MR-LUMBAR SPINE-W/O; Future  -     cyclobenzaprine (FLEXERIL) 10 MG Tab; Take 1 Tab by mouth 3 times a day as needed.      The patient has acute on chronic severe left-sided low back pain rating down the left leg consistent with a left lower lumbar radiculitis.  These have caused functional deficits with difficulty with any activity requiring bending including lower body dressing.    She is failed conservative treatments of medication management, physical therapy, home exercise program including her home exercise program the past 2 months from physical therapy.  She continues to have pain and functional deficits.    Physical therapy: I ordered physical therapy to focus on strengthening and stretching.     home exercise program: I provided the patient with a strengthening and stretching with a home exercise program     Diagnostic  workup: As above    Medications: as above    Interventional program: I would consider the patient for an epidural steroid injection depending on the results of the MRI above      Outside records requested:  The patient signed outside records request form for her outside records including outside images. This includes the records from [facility]      Follow-up: After the above diagnostic studies           Please note that this dictation was created using voice recognition software. I have made every reasonable attempt to correct obvious errors but there may be errors of grammar and content that I may have overlooked prior to finalization of this note.      Bro Mojica MD  Physical Medicine and Rehabilitation  Interventional Spine and Sports Physiatry  [facility]           CC Luciano Still M  CC ELOISA Petit.

## 2020-10-01 ENCOUNTER — OFFICE VISIT (OUTPATIENT)
Dept: PHYSICAL MEDICINE AND REHAB | Facility: MEDICAL CENTER | Age: 45
End: 2020-10-01
Payer: COMMERCIAL

## 2020-10-01 VITALS
SYSTOLIC BLOOD PRESSURE: 110 MMHG | HEIGHT: 69 IN | BODY MASS INDEX: 43.4 KG/M2 | OXYGEN SATURATION: 100 % | HEART RATE: 75 BPM | DIASTOLIC BLOOD PRESSURE: 80 MMHG | TEMPERATURE: 97.6 F | WEIGHT: 293 LBS

## 2020-10-01 DIAGNOSIS — G89.29 CHRONIC LEFT-SIDED LOW BACK PAIN WITH LEFT-SIDED SCIATICA: ICD-10-CM

## 2020-10-01 DIAGNOSIS — Z78.9 IMPAIRED MOBILITY AND ADLS: ICD-10-CM

## 2020-10-01 DIAGNOSIS — R29.898 LEFT LEG WEAKNESS: ICD-10-CM

## 2020-10-01 DIAGNOSIS — M54.42 CHRONIC LEFT-SIDED LOW BACK PAIN WITH LEFT-SIDED SCIATICA: ICD-10-CM

## 2020-10-01 DIAGNOSIS — M54.16 LUMBAR RADICULITIS: ICD-10-CM

## 2020-10-01 DIAGNOSIS — Z74.09 IMPAIRED MOBILITY AND ADLS: ICD-10-CM

## 2020-10-01 PROCEDURE — 99205 OFFICE O/P NEW HI 60 MIN: CPT | Performed by: PHYSICAL MEDICINE & REHABILITATION

## 2020-10-01 RX ORDER — CYCLOBENZAPRINE HCL 10 MG
10 TABLET ORAL 3 TIMES DAILY PRN
Qty: 90 TAB | Refills: 3 | Status: SHIPPED | OUTPATIENT
Start: 2020-10-01 | End: 2021-01-26

## 2020-10-01 ASSESSMENT — ENCOUNTER SYMPTOMS
BACK PAIN: 1
LEG PAIN: 1
NUMBNESS: 1
TINGLING: 1

## 2020-10-01 ASSESSMENT — PATIENT HEALTH QUESTIONNAIRE - PHQ9
CLINICAL INTERPRETATION OF PHQ2 SCORE: 0
5. POOR APPETITE OR OVEREATING: 3 - NEARLY EVERY DAY

## 2020-10-01 ASSESSMENT — PAIN SCALES - GENERAL: PAINLEVEL: 3=SLIGHT PAIN

## 2020-10-19 ENCOUNTER — HOSPITAL ENCOUNTER (OUTPATIENT)
Dept: RADIOLOGY | Facility: MEDICAL CENTER | Age: 45
End: 2020-10-19
Payer: COMMERCIAL

## 2020-10-20 NOTE — RESULT ENCOUNTER NOTE
The MRI has been completed.  Please let the patient know that I can see her sooner in clinic if she prefers.  Okay for urgent spot.Dr. Mojica

## 2020-10-22 ENCOUNTER — OFFICE VISIT (OUTPATIENT)
Dept: PHYSICAL MEDICINE AND REHAB | Facility: MEDICAL CENTER | Age: 45
End: 2020-10-22
Payer: COMMERCIAL

## 2020-10-22 VITALS
BODY MASS INDEX: 43.4 KG/M2 | SYSTOLIC BLOOD PRESSURE: 128 MMHG | WEIGHT: 293 LBS | HEIGHT: 69 IN | OXYGEN SATURATION: 96 % | TEMPERATURE: 97.3 F | DIASTOLIC BLOOD PRESSURE: 80 MMHG | HEART RATE: 104 BPM

## 2020-10-22 DIAGNOSIS — M54.42 CHRONIC LEFT-SIDED LOW BACK PAIN WITH LEFT-SIDED SCIATICA: ICD-10-CM

## 2020-10-22 DIAGNOSIS — Z74.09 IMPAIRED MOBILITY AND ADLS: ICD-10-CM

## 2020-10-22 DIAGNOSIS — G89.29 CHRONIC LEFT-SIDED LOW BACK PAIN WITH LEFT-SIDED SCIATICA: ICD-10-CM

## 2020-10-22 DIAGNOSIS — M54.16 LUMBAR RADICULITIS: ICD-10-CM

## 2020-10-22 DIAGNOSIS — R29.898 LEFT LEG WEAKNESS: ICD-10-CM

## 2020-10-22 DIAGNOSIS — Z78.9 IMPAIRED MOBILITY AND ADLS: ICD-10-CM

## 2020-10-22 PROCEDURE — 99214 OFFICE O/P EST MOD 30 MIN: CPT | Performed by: PHYSICAL MEDICINE & REHABILITATION

## 2020-10-22 ASSESSMENT — PAIN SCALES - GENERAL: PAINLEVEL: 5=MODERATE PAIN

## 2020-10-22 ASSESSMENT — PATIENT HEALTH QUESTIONNAIRE - PHQ9
SUM OF ALL RESPONSES TO PHQ QUESTIONS 1-9: 11
5. POOR APPETITE OR OVEREATING: 3 - NEARLY EVERY DAY
CLINICAL INTERPRETATION OF PHQ2 SCORE: 1

## 2020-10-22 NOTE — PROGRESS NOTES
Follow up patient note  Interventional spine and sports physiatry, Physical medicine rehabilitation      Chief complaint:   Chief Complaint   Patient presents with   • Follow-Up     Back pain          HISTORY    Please see new patient note by Dr Mojica,  for more details.     HPI  Patient identification: José Luis Blackburn ,  1975,   With Diagnoses of Lumbar radiculitis, Chronic left-sided low back pain with left-sided sciatica, Left leg weakness, and Impaired mobility and ADLs were pertinent to this visit.       -Acute on chronic left low back pain radiating down the left leg to the foot including the dorsal aspect of the foot and the first and second webspace as well as to the left heel with associated numbness and weakness as well as with functional deficits difficulty with ADLs.  The patient has done her home exercise program including from physical therapy and from sports med over the past 2-1/2 months with no improvement.       ROS Red Flags :   Fever, Chills, Sweats: Denies  Involuntary Weight Loss: Denies  Bowel/Bladder Incontinence: Denies  Saddle Anesthesia: Denies        PMHx:   Past Medical History:   Diagnosis Date   • Allergy    • Anemia     with pregnancy   • Anxiety    • Arthritis    • Depression    • Headache(784.0)    • kid ston    • pac 2009    and PVCs   • Sciatica    • Seizure (HCC)     Petit mal until puberty   • Urinary tract infection, site not specified        PSHx:   Past Surgical History:   Procedure Laterality Date   • LITHOTRIPSY         Family history   Family History   Problem Relation Age of Onset   • Arthritis Mother         Rheumatoid arthritis   • Diabetes Father    • Hypertension Father    • Hyperlipidemia Father    • Hypertension Brother    • Hypertension Maternal Grandmother    • Cancer Maternal Grandfather         mesothelioma from asbestos   • Hypertension Paternal Grandmother    • Cancer Paternal Grandfather         bone   • Hypertension Paternal Grandfather  "         Medications:   Outpatient Medications Marked as Taking for the 10/22/20 encounter (Office Visit) with Bro Mojica M.D.   Medication Sig Dispense Refill   • cyclobenzaprine (FLEXERIL) 10 MG Tab Take 1 Tab by mouth 3 times a day as needed. 90 Tab 3   • acetaminophen (TYLENOL) 500 MG Tab Take 500-1,000 mg by mouth every 6 hours as needed.     • Pseudoeph-Doxylamine-DM-APAP (NYQUIL PO) Take  by mouth.     • acetaminophen-codeine #3 (TYLENOL #3) 300-30 MG Tab Take 1-2 Tabs by mouth every four hours as needed.     • phenazopyridine (PYRIDIUM) 200 MG Tab Take 1 Tab by mouth 3 times a day as needed. 6 Tab 0   • LORAZEPAM PO Take  by mouth.     • ibuprofen (MOTRIN) 600 MG Tab Take 600 mg by mouth every 6 hours as needed.          Current Outpatient Medications on File Prior to Visit   Medication Sig Dispense Refill   • cyclobenzaprine (FLEXERIL) 10 MG Tab Take 1 Tab by mouth 3 times a day as needed. 90 Tab 3   • acetaminophen (TYLENOL) 500 MG Tab Take 500-1,000 mg by mouth every 6 hours as needed.     • Pseudoeph-Doxylamine-DM-APAP (NYQUIL PO) Take  by mouth.     • acetaminophen-codeine #3 (TYLENOL #3) 300-30 MG Tab Take 1-2 Tabs by mouth every four hours as needed.     • phenazopyridine (PYRIDIUM) 200 MG Tab Take 1 Tab by mouth 3 times a day as needed. 6 Tab 0   • LORAZEPAM PO Take  by mouth.     • ibuprofen (MOTRIN) 600 MG Tab Take 600 mg by mouth every 6 hours as needed.       No current facility-administered medications on file prior to visit.          Allergies:   Allergies   Allergen Reactions   • Gabapentin      Cognitive side effects    • Other Drug      \"some epilepsy medications, unsure of name\"       Social Hx:   Social History     Socioeconomic History   • Marital status: Single     Spouse name: Not on file   • Number of children: Not on file   • Years of education: Not on file   • Highest education level: Not on file   Occupational History   • Not on file   Social Needs   • Financial resource " "strain: Not on file   • Food insecurity     Worry: Not on file     Inability: Not on file   • Transportation needs     Medical: Not on file     Non-medical: Not on file   Tobacco Use   • Smoking status: Former Smoker     Years: 15.00     Types: Cigarettes   • Smokeless tobacco: Never Used   • Tobacco comment: Socially twice a month now   Substance and Sexual Activity   • Alcohol use: No   • Drug use: No   • Sexual activity: Yes     Partners: Male     Birth control/protection: Rhythm   Lifestyle   • Physical activity     Days per week: Not on file     Minutes per session: Not on file   • Stress: Not on file   Relationships   • Social connections     Talks on phone: Not on file     Gets together: Not on file     Attends Taoism service: Not on file     Active member of club or organization: Not on file     Attends meetings of clubs or organizations: Not on file     Relationship status: Not on file   • Intimate partner violence     Fear of current or ex partner: Not on file     Emotionally abused: Not on file     Physically abused: Not on file     Forced sexual activity: Not on file   Other Topics Concern   •  Service No   • Blood Transfusions No   • Caffeine Concern No   • Occupational Exposure No   • Hobby Hazards No   • Sleep Concern Yes   • Stress Concern No   • Weight Concern Yes   • Special Diet No   • Back Care Yes   • Exercise No   • Bike Helmet No   • Seat Belt Yes   • Self-Exams Yes   Social History Narrative   • Not on file         EXAMINATION     Physical Exam:   Vitals: /80 (BP Location: Left arm, Patient Position: Sitting, BP Cuff Size: Adult)   Pulse (!) 104   Temp 36.3 °C (97.3 °F) (Temporal)   Ht 1.759 m (5' 9.25\")   Wt (!) 135.4 kg (298 lb 8.1 oz)   SpO2 96%     Constitutional:   Body Habitus: Body mass index is 43.76 kg/m².  Cooperation: Fully cooperates with exam  Appearance: Well-groomed no disheveled    Respiratory-  breathing comfortable on room air, no audible " wheezing  Cardiovascular- capillary refills less than 2 seconds. No lower extremity edema is noted.   Psychiatric- alert and oriented ×3. Normal affect.    MSK and Neuro: -  decreased active range of motion with flexion, lateral flexion, and rotation bilaterally.   There is decreased active range of motion with lumbar extension.      Palpation:   No tenderness to palpation in midline at T1-T12 levels. No tenderness to palpation in the left and right of the midline T1-L5, NEGATIVE for tenderness to palpation to the para-midline region in the lower lumbar levels.  palpation over SI joint: negative bilaterally    palpation in hip or over the gluteus medius tendon insertion: negative bilaterally      Lumbar spine Special tests  Neuro tension  Straight leg test negative right, positive left    Slump test negative right, positive left      Key points for the international standards for neurological classification of spinal cord injury (ISNCSCI) to light touch.     Dermatome R L                                      L2 2 2   L3 2 2   L4 2 2   L5 2 1   S1 2 1   S2 2 2         Motor Exam Lower Extremities    ? Myotome R L   Hip flexion L2 5 5   Knee extension L3 5 5   Ankle dorsiflexion L4 5 5   Toe extension L5 5 5-   Ankle plantarflexion S1 5 5-                 MEDICAL DECISION MAKING    DATA    Labs:   No results found for: SODIUM, POTASSIUM, CHLORIDE, CO2, GLUCOSE, BUN, CREATININE, BUNCREATRAT, GLOMRATE     No results found for: PROTHROMBTM, INR     No results found for: WBC, RBC, HEMOGLOBIN, HEMATOCRIT, MCV, MCH, MCHC, MPV, NEUTSPOLYS, LYMPHOCYTES, MONOCYTES, EOSINOPHILS, BASOPHILS, HYPOCHROMIA, ANISOCYTOSIS     No results found for: HBA1C       Imaging:   I personally reviewed following images      MRI lumbar spine dated 10/16/2020  At L5-S1 there is a large left paracentral disc herniation with impingement on the descending left S1 nerve root.  At L4-5 there is a small disc protrusion with mild left neuroforaminal  stenosis.    I reviewed the following radiology reports               Results for orders placed in visit on 10/16/20   MR-LUMBAR SPINE-W/O                                                                                                                                                                                                      Results for orders placed during the hospital encounter of 20   DX-LUMBAR SPINE-2 OR 3 VIEWS    Impression No significant spondylosis. No acute fracture or listhesis.                                           DIAGNOSIS   Visit Diagnoses     ICD-10-CM   1. Lumbar radiculitis  M54.16   2. Chronic left-sided low back pain with left-sided sciatica  M54.42    G89.29   3. Left leg weakness  R29.898   4. Impaired mobility and ADLs  Z74.09    Z78.9         ASSESSMENT and PLAN:     José Luis Brizuelayahaira  1975 female      José Luis Marinelli was seen today for follow-up.    Diagnoses and all orders for this visit:    Lumbar radiculitis  -     Cancel: REFERRAL TO PHYSICAL MEDICINE REHAB  -     REFERRAL TO PHYSICAL MEDICINE REHAB    Chronic left-sided low back pain with left-sided sciatica  -     Cancel: REFERRAL TO PHYSICAL MEDICINE REHAB    Left leg weakness  -     Cancel: REFERRAL TO PHYSICAL MEDICINE REHAB    Impaired mobility and ADLs      Stop NSAIDs including ibuprofen 600 mg 5 days prior to the procedure below.  Advised patient not to use her lorazepam on the day of the procedure because we are planning to do this with sedation with IV sedation given her anxiety.    The patient has 2 disc herniations one at L4-5 and one at L5-S1 and on exam and history she has symptoms mostly consistent with an L5 and S1 radiculopathy.    She has failed conservative treatments of medication management, home exercise program, physical therapy.  This includes her home program that she has done by physical therapy and from sports medicine including over the past 2-1/2 months.    I have ordered a left  L5-S1 and S1 transforaminal epidural steroid injection with sedation    The risks benefits and alternatives to this procedure were discussed and the patient wishes to proceed with the procedure. Risks include but are not limited to damage to surrounding structures, infection, bleeding, worsening of pain which can be permanent, weakness which can be permanent. Benefits include pain relief, improved function. Alternatives includes not doing the procedure.      I also discussed the case with the patient's  who was at today's visit and assisted with the HPI.      Follow up: After the above procedure for diagnostic and therapeutic purposes    Thank you for allowing me to participate in the care of this patient. If you have any questions please not hesitate to contact me.             Please note that this dictation was created using voice recognition software. I have made every reasonable attempt to correct obvious errors but there may be errors of grammar and content that I may have overlooked prior to finalization of this note.      Bro Mojica MD  Interventional Spine and Sports Physiatry  Physical Medicine and Rehabilitation  [facility]

## 2020-10-22 NOTE — PATIENT INSTRUCTIONS
Your procedure will be at the [facility] special procedure suite.    [facility] [place] [address]       PRE-PROCEDURE INSTRUCTIONS  You may take your regular medications except:   · No Anti-inflammatories 5 days prior to your procedure. Anti-inflammatories include medicines such as  ibuprofen (Motrin, Advil), Excedrin, Naproxen (Aleve, Anaprox, Naprelan, Naprosyn), Celecoxib (Celebrex), Diclofenac (Voltaren-XR tab), and Meloxicam (Mobic).   · No Glucophage or Metformin 24 hours before your procedure. You may resume next day after your procedure.  · Call the physiatry office if you are taking or prescribed anti-biotics within five days of procedure.  · Please ask provider if you are taking any new diabetes medication.  · CONTINUE TAKING BLOOD PRESSURE MEDICATIONS AS PRESCRIBED.  · Pain medications will not be prescribed on the procedure day. Procedural pain medication may be used by your provider   · Call your doctor's office performing the procedure if you have a fever, chills, rash or new illness prior to your procedure    Anticoagulation/antiplatelet medications  No Blood thinning medications such as Coumadin or Plavix 5 days prior to procedure unless your doctor said to continue these medications. Call your doctor if a new medication is prescribed in this class.     Restrictions for eating before procedure:   · If you are getting procedural sedation, then do not eat to for 8 hours prior to procedure appointment time. Do not drink fluids for four hours prior to your procedure time.   · If you are not having procedural sedation, then Skip the meal prior to your procedure. If you have a morning procedure then skip breakfast. If you have an afternoon procedure then skip lunch.   · You may drink clear liquids up to 2 hours prior to your procedure  · You must have a  the day of procedure to accompany you home.      POST PROCEDURE INSTRUCTIONS   · No heavy lifting, strenuous bending or  strenuous exercise for 3 days after your procedure.  · No hot tubs, baths, swimming for 3 days after your procedure  · You can remove the bandage the day after the procedure.  · IF YOU RECEIVED A STEROID INJECTION. PLEASE NOTE THAT THERE MAY BE A DELAY FOR THE INJECTION TO START WORKING, THE DELAY MAY BE UP TO TWO WEEKS. IF YOU HAVE DIABETES, PLEASE NOTE THAT YOUR SUGAR LEVELS MAY BE ELEVATED FOR 1-2 DAYS AFTER A STEROID INJECTION.  THE STEROID MAY CAUSE TEMPORARY SYMPTOMS WHICH USUALLY RESOLVE ON THEIR OWN WITHIN 1 TO 2 DAYS INCLUDING FACIAL FLUSHING OR A FEELING OF WARMTH ON THE FACE, TEMPORARY INCREASES IN BLOOD SUGAR, INSOMNIA, INCREASED HUNGER  · IF YOU EXPERIENCE PROLONGED WEAKNESS LONGER THAN ONE DAY, BOWEL OR BLADDER INCONTINENCE THEN PLEASE CALL THE PHYSIATRY OFFICE.  · Your leg may feel heavy, weak and numb for up to 1-2 days. Be very careful walking.   ·  You may resume normal activities 3 days after procedure.

## 2020-10-22 NOTE — H&P (VIEW-ONLY)
Follow up patient note  Interventional spine and sports physiatry, Physical medicine rehabilitation      Chief complaint:   Chief Complaint   Patient presents with   • Follow-Up     Back pain          HISTORY    Please see new patient note by Dr Mojica,  for more details.     HPI  Patient identification: José Luis Blackburn ,  1975,   With Diagnoses of Lumbar radiculitis, Chronic left-sided low back pain with left-sided sciatica, Left leg weakness, and Impaired mobility and ADLs were pertinent to this visit.       -Acute on chronic left low back pain radiating down the left leg to the foot including the dorsal aspect of the foot and the first and second webspace as well as to the left heel with associated numbness and weakness as well as with functional deficits difficulty with ADLs.  The patient has done her home exercise program including from physical therapy and from sports med over the past 2-1/2 months with no improvement.       ROS Red Flags :   Fever, Chills, Sweats: Denies  Involuntary Weight Loss: Denies  Bowel/Bladder Incontinence: Denies  Saddle Anesthesia: Denies        PMHx:   Past Medical History:   Diagnosis Date   • Allergy    • Anemia     with pregnancy   • Anxiety    • Arthritis    • Depression    • Headache(784.0)    • kid ston    • pac 2009    and PVCs   • Sciatica    • Seizure (HCC)     Petit mal until puberty   • Urinary tract infection, site not specified        PSHx:   Past Surgical History:   Procedure Laterality Date   • LITHOTRIPSY         Family history   Family History   Problem Relation Age of Onset   • Arthritis Mother         Rheumatoid arthritis   • Diabetes Father    • Hypertension Father    • Hyperlipidemia Father    • Hypertension Brother    • Hypertension Maternal Grandmother    • Cancer Maternal Grandfather         mesothelioma from asbestos   • Hypertension Paternal Grandmother    • Cancer Paternal Grandfather         bone   • Hypertension Paternal Grandfather  "         Medications:   Outpatient Medications Marked as Taking for the 10/22/20 encounter (Office Visit) with Bro Mojica M.D.   Medication Sig Dispense Refill   • cyclobenzaprine (FLEXERIL) 10 MG Tab Take 1 Tab by mouth 3 times a day as needed. 90 Tab 3   • acetaminophen (TYLENOL) 500 MG Tab Take 500-1,000 mg by mouth every 6 hours as needed.     • Pseudoeph-Doxylamine-DM-APAP (NYQUIL PO) Take  by mouth.     • acetaminophen-codeine #3 (TYLENOL #3) 300-30 MG Tab Take 1-2 Tabs by mouth every four hours as needed.     • phenazopyridine (PYRIDIUM) 200 MG Tab Take 1 Tab by mouth 3 times a day as needed. 6 Tab 0   • LORAZEPAM PO Take  by mouth.     • ibuprofen (MOTRIN) 600 MG Tab Take 600 mg by mouth every 6 hours as needed.          Current Outpatient Medications on File Prior to Visit   Medication Sig Dispense Refill   • cyclobenzaprine (FLEXERIL) 10 MG Tab Take 1 Tab by mouth 3 times a day as needed. 90 Tab 3   • acetaminophen (TYLENOL) 500 MG Tab Take 500-1,000 mg by mouth every 6 hours as needed.     • Pseudoeph-Doxylamine-DM-APAP (NYQUIL PO) Take  by mouth.     • acetaminophen-codeine #3 (TYLENOL #3) 300-30 MG Tab Take 1-2 Tabs by mouth every four hours as needed.     • phenazopyridine (PYRIDIUM) 200 MG Tab Take 1 Tab by mouth 3 times a day as needed. 6 Tab 0   • LORAZEPAM PO Take  by mouth.     • ibuprofen (MOTRIN) 600 MG Tab Take 600 mg by mouth every 6 hours as needed.       No current facility-administered medications on file prior to visit.          Allergies:   Allergies   Allergen Reactions   • Gabapentin      Cognitive side effects    • Other Drug      \"some epilepsy medications, unsure of name\"       Social Hx:   Social History     Socioeconomic History   • Marital status: Single     Spouse name: Not on file   • Number of children: Not on file   • Years of education: Not on file   • Highest education level: Not on file   Occupational History   • Not on file   Social Needs   • Financial resource " "strain: Not on file   • Food insecurity     Worry: Not on file     Inability: Not on file   • Transportation needs     Medical: Not on file     Non-medical: Not on file   Tobacco Use   • Smoking status: Former Smoker     Years: 15.00     Types: Cigarettes   • Smokeless tobacco: Never Used   • Tobacco comment: Socially twice a month now   Substance and Sexual Activity   • Alcohol use: No   • Drug use: No   • Sexual activity: Yes     Partners: Male     Birth control/protection: Rhythm   Lifestyle   • Physical activity     Days per week: Not on file     Minutes per session: Not on file   • Stress: Not on file   Relationships   • Social connections     Talks on phone: Not on file     Gets together: Not on file     Attends Orthodox service: Not on file     Active member of club or organization: Not on file     Attends meetings of clubs or organizations: Not on file     Relationship status: Not on file   • Intimate partner violence     Fear of current or ex partner: Not on file     Emotionally abused: Not on file     Physically abused: Not on file     Forced sexual activity: Not on file   Other Topics Concern   •  Service No   • Blood Transfusions No   • Caffeine Concern No   • Occupational Exposure No   • Hobby Hazards No   • Sleep Concern Yes   • Stress Concern No   • Weight Concern Yes   • Special Diet No   • Back Care Yes   • Exercise No   • Bike Helmet No   • Seat Belt Yes   • Self-Exams Yes   Social History Narrative   • Not on file         EXAMINATION     Physical Exam:   Vitals: /80 (BP Location: Left arm, Patient Position: Sitting, BP Cuff Size: Adult)   Pulse (!) 104   Temp 36.3 °C (97.3 °F) (Temporal)   Ht 1.759 m (5' 9.25\")   Wt (!) 135.4 kg (298 lb 8.1 oz)   SpO2 96%     Constitutional:   Body Habitus: Body mass index is 43.76 kg/m².  Cooperation: Fully cooperates with exam  Appearance: Well-groomed no disheveled    Respiratory-  breathing comfortable on room air, no audible " wheezing  Cardiovascular- capillary refills less than 2 seconds. No lower extremity edema is noted.   Psychiatric- alert and oriented ×3. Normal affect.    MSK and Neuro: -  decreased active range of motion with flexion, lateral flexion, and rotation bilaterally.   There is decreased active range of motion with lumbar extension.      Palpation:   No tenderness to palpation in midline at T1-T12 levels. No tenderness to palpation in the left and right of the midline T1-L5, NEGATIVE for tenderness to palpation to the para-midline region in the lower lumbar levels.  palpation over SI joint: negative bilaterally    palpation in hip or over the gluteus medius tendon insertion: negative bilaterally      Lumbar spine Special tests  Neuro tension  Straight leg test negative right, positive left    Slump test negative right, positive left      Key points for the international standards for neurological classification of spinal cord injury (ISNCSCI) to light touch.     Dermatome R L                                      L2 2 2   L3 2 2   L4 2 2   L5 2 1   S1 2 1   S2 2 2         Motor Exam Lower Extremities    ? Myotome R L   Hip flexion L2 5 5   Knee extension L3 5 5   Ankle dorsiflexion L4 5 5   Toe extension L5 5 5-   Ankle plantarflexion S1 5 5-                 MEDICAL DECISION MAKING    DATA    Labs:   No results found for: SODIUM, POTASSIUM, CHLORIDE, CO2, GLUCOSE, BUN, CREATININE, BUNCREATRAT, GLOMRATE     No results found for: PROTHROMBTM, INR     No results found for: WBC, RBC, HEMOGLOBIN, HEMATOCRIT, MCV, MCH, MCHC, MPV, NEUTSPOLYS, LYMPHOCYTES, MONOCYTES, EOSINOPHILS, BASOPHILS, HYPOCHROMIA, ANISOCYTOSIS     No results found for: HBA1C       Imaging:   I personally reviewed following images      MRI lumbar spine dated 10/16/2020  At L5-S1 there is a large left paracentral disc herniation with impingement on the descending left S1 nerve root.  At L4-5 there is a small disc protrusion with mild left neuroforaminal  stenosis.    I reviewed the following radiology reports               Results for orders placed in visit on 10/16/20   MR-LUMBAR SPINE-W/O                                                                                                                                                                                                      Results for orders placed during the hospital encounter of 20   DX-LUMBAR SPINE-2 OR 3 VIEWS    Impression No significant spondylosis. No acute fracture or listhesis.                                           DIAGNOSIS   Visit Diagnoses     ICD-10-CM   1. Lumbar radiculitis  M54.16   2. Chronic left-sided low back pain with left-sided sciatica  M54.42    G89.29   3. Left leg weakness  R29.898   4. Impaired mobility and ADLs  Z74.09    Z78.9         ASSESSMENT and PLAN:     José Luis Brizuelayahaira  1975 female      José Luis Marinelli was seen today for follow-up.    Diagnoses and all orders for this visit:    Lumbar radiculitis  -     Cancel: REFERRAL TO PHYSICAL MEDICINE REHAB  -     REFERRAL TO PHYSICAL MEDICINE REHAB    Chronic left-sided low back pain with left-sided sciatica  -     Cancel: REFERRAL TO PHYSICAL MEDICINE REHAB    Left leg weakness  -     Cancel: REFERRAL TO PHYSICAL MEDICINE REHAB    Impaired mobility and ADLs      Stop NSAIDs including ibuprofen 600 mg 5 days prior to the procedure below.  Advised patient not to use her lorazepam on the day of the procedure because we are planning to do this with sedation with IV sedation given her anxiety.    The patient has 2 disc herniations one at L4-5 and one at L5-S1 and on exam and history she has symptoms mostly consistent with an L5 and S1 radiculopathy.    She has failed conservative treatments of medication management, home exercise program, physical therapy.  This includes her home program that she has done by physical therapy and from sports medicine including over the past 2-1/2 months.    I have ordered a left  L5-S1 and S1 transforaminal epidural steroid injection with sedation    The risks benefits and alternatives to this procedure were discussed and the patient wishes to proceed with the procedure. Risks include but are not limited to damage to surrounding structures, infection, bleeding, worsening of pain which can be permanent, weakness which can be permanent. Benefits include pain relief, improved function. Alternatives includes not doing the procedure.      I also discussed the case with the patient's  who was at today's visit and assisted with the HPI.      Follow up: After the above procedure for diagnostic and therapeutic purposes    Thank you for allowing me to participate in the care of this patient. If you have any questions please not hesitate to contact me.             Please note that this dictation was created using voice recognition software. I have made every reasonable attempt to correct obvious errors but there may be errors of grammar and content that I may have overlooked prior to finalization of this note.      Bro Mojica MD  Interventional Spine and Sports Physiatry  Physical Medicine and Rehabilitation  [facility]

## 2020-11-09 ENCOUNTER — TELEPHONE (OUTPATIENT)
Dept: PHYSICAL MEDICINE AND REHAB | Facility: MEDICAL CENTER | Age: 45
End: 2020-11-09

## 2020-11-10 NOTE — PREPROCEDURE INSTRUCTIONS
PAT call returned by pt at 1320, spoke with pt after confirming 2 pt identifiers. Health hx, medications, allergies  reviewed with pt, as well as pre-procedure/sedation instructions. Respiratory screen completed. Pt denies being sick/symptomatic (fever, cough, SOB, diarrhea) w/in last 72 hrs, or having close contact w/ sick individuals in the past 2 wks. Pt education to notify her MD should she become symptomatic prior to procedure. Pt verbalizes understanding. Pt told to bring a form fitting mask and the she will be wearing it entire stay. Informed pt it is recommended pt self isolate for 72 hrs or from time of this call until procedure, also that we request the  to remain on site until pt is discharged. Pt verbalizes understanding.

## 2020-11-16 ENCOUNTER — APPOINTMENT (OUTPATIENT)
Dept: RADIOLOGY | Facility: REHABILITATION | Age: 45
End: 2020-11-16
Attending: PHYSICAL MEDICINE & REHABILITATION
Payer: COMMERCIAL

## 2020-11-16 ENCOUNTER — HOSPITAL ENCOUNTER (OUTPATIENT)
Facility: REHABILITATION | Age: 45
End: 2020-11-16
Attending: PHYSICAL MEDICINE & REHABILITATION | Admitting: PHYSICAL MEDICINE & REHABILITATION
Payer: COMMERCIAL

## 2020-11-16 VITALS
DIASTOLIC BLOOD PRESSURE: 83 MMHG | HEIGHT: 69 IN | BODY MASS INDEX: 43.4 KG/M2 | SYSTOLIC BLOOD PRESSURE: 143 MMHG | WEIGHT: 293 LBS | TEMPERATURE: 97.5 F | RESPIRATION RATE: 18 BRPM | OXYGEN SATURATION: 93 % | HEART RATE: 76 BPM

## 2020-11-16 PROCEDURE — 99152 MOD SED SAME PHYS/QHP 5/>YRS: CPT

## 2020-11-16 PROCEDURE — 700111 HCHG RX REV CODE 636 W/ 250 OVERRIDE (IP)

## 2020-11-16 PROCEDURE — 64484 NJX AA&/STRD TFRM EPI L/S EA: CPT

## 2020-11-16 PROCEDURE — 700117 HCHG RX CONTRAST REV CODE 255

## 2020-11-16 PROCEDURE — A9585 GADOBUTROL INJECTION: HCPCS

## 2020-11-16 PROCEDURE — 64483 NJX AA&/STRD TFRM EPI L/S 1: CPT

## 2020-11-16 RX ORDER — MIDAZOLAM HYDROCHLORIDE 1 MG/ML
INJECTION INTRAMUSCULAR; INTRAVENOUS
Status: COMPLETED
Start: 2020-11-16 | End: 2020-11-16

## 2020-11-16 RX ORDER — GADOBUTROL 604.72 MG/ML
INJECTION INTRAVENOUS
Status: COMPLETED
Start: 2020-11-16 | End: 2020-11-16

## 2020-11-16 RX ORDER — LIDOCAINE HYDROCHLORIDE 10 MG/ML
INJECTION, SOLUTION EPIDURAL; INFILTRATION; INTRACAUDAL; PERINEURAL
Status: COMPLETED
Start: 2020-11-16 | End: 2020-11-16

## 2020-11-16 RX ORDER — DEXAMETHASONE SODIUM PHOSPHATE 10 MG/ML
INJECTION, SOLUTION INTRAMUSCULAR; INTRAVENOUS
Status: COMPLETED
Start: 2020-11-16 | End: 2020-11-16

## 2020-11-16 RX ADMIN — FENTANYL CITRATE 50 MCG: 50 INJECTION, SOLUTION INTRAMUSCULAR; INTRAVENOUS at 08:13

## 2020-11-16 RX ADMIN — MIDAZOLAM HYDROCHLORIDE 1 MG: 1 INJECTION, SOLUTION INTRAMUSCULAR; INTRAVENOUS at 08:13

## 2020-11-16 RX ADMIN — GADOBUTROL 2 ML: 604.72 INJECTION INTRAVENOUS at 08:18

## 2020-11-16 RX ADMIN — DEXAMETHASONE SODIUM PHOSPHATE 20 MG: 10 INJECTION, SOLUTION INTRAMUSCULAR; INTRAVENOUS at 08:20

## 2020-11-16 RX ADMIN — LIDOCAINE HYDROCHLORIDE 10 ML: 10 INJECTION, SOLUTION EPIDURAL; INFILTRATION; INTRACAUDAL; PERINEURAL at 08:00

## 2020-11-16 ASSESSMENT — PAIN DESCRIPTION - PAIN TYPE: TYPE: CHRONIC PAIN

## 2020-11-16 NOTE — PROGRESS NOTES
Pt given verbal and written d/c instructions including verbal infection prevention information and s/s of post procedure infection and verbalizes understanding. denies nsaid use in last 3 days, denies blood thinners use in last 5 days, denies current infection or abx use. Med rec complete. Pt has post procedural ride home with her .

## 2020-11-16 NOTE — NON-PROVIDER
Positioned prone onto procedure table and necessary monitoring equipment connected ( oxygen, EKG pad,pulse oximeter, BP ) with the help by  team  (  CST, X-ray TECH and RN ). Hands supported with stool underneath headrest of the bed , lower extremities supported with pillow.

## 2020-11-16 NOTE — NON-PROVIDER
Patient identified. Site and procedure confirmed and dustin by Dr. Mojica .Medication allergies. Reviewed pertinent medical history ( seizure, HTN ). Motrin off for 6 daysTimeout completed, team and patient agreed.

## 2020-11-16 NOTE — INTERVAL H&P NOTE
H&P reviewed. The patient was examined and there are no changes to the H&P     Lungs clear to auscultation bilaterally.  No abdominal tenderness.  Heart regular rate and rhythm.  Vitals reviewed.    Proceed with the originally scheduled procedure

## 2020-11-16 NOTE — OP REPORT
Date of Service: 11/16/2020     Patient: José Luis Blackburn 44 y.o. female     MRN: [id number]     Physician/s: Bro Mojica MD    Pre-operative Diagnosis: Lumbar radiculopathy    Post-operative Diagnosis: Lumbar radiculopathy    Procedure: left Lumbar Transforaminal Epidural Steroid  at the L5-S1 and S1 levels.     Description of procedure:    The risks, benefits, and alternatives of the procedure were reviewed and discussed with the patient.  Written informed consent was freely obtained. A pre-procedural time-out was conducted by the physician verifying patient’s identity, procedure to be performed, procedure site and side, and allergy verification. Appropriate equipment was determined to be in place for the procedure.     Moderation sedation was achieved with Versed (1mg) and Fentanyl (50mcg). Monitoring of the patients vital signs and respiratory status was provided by trained independent registered nurse during the entire course of the procedures and under my supervision and recoded in the patient’s medical record. The duration of sedation was over 10 minutes.     The patient's vital signs were carefully monitored before, throughout, and after the procedure.     In the fluoroscopy suite the patient was placed in a prone position, a pillow placed underneath their umbilicus. The skin was prepped and draped in the usual sterile fashion.     The fluoroscope was placed over the lumbar spine and adjusted into the proper AP/Oblique view to enter the transforaminal space just adjacent to the pedicle at the levels below. The targets for injection were then marked at the left L5-S1. A 27g 1.5 inch needle was placed into the marked site, and 1mL of 1% Lidocaine was injected subcutaneously into the epidermal and dermal layers. The needle was removed intact.  A 22g 5 inch spinal needle was then placed and advanced under fluoroscopic guidance in an oblique view towards the epidural space of the levels noted above. The  needle position was confirmed to not be past the 6 o'clock position in the AP view and it was in the neuroforamen in the lateral view.        The fluoroscope was was then adjusted over the lumbar spine and adjusted into the proper AP/Oblique view to enter the transforaminal space adjacent to the pedicle at the LEFT  S1. A 27g 1.5 inch needle was placed into the marked site, and 1mL of 1% Lidocaine was injected subcutaneously into the epidermal and dermal layers. The needle was removed intact.  A 22g 5 inch spinal needle was then placed and advanced under fluoroscopic guidance in an oblique view towards the epidural space of the levels noted above. The needle position was confirmed to not be past the 6 o'clock position in the AP view and it was in the neuroforamen in the lateral view.       Under live fluoroscopic guidance in the AP view, contrast dye was used to highlight the epidural space spread of each level above. Final fluoroscopic images were saved.  Following negative aspiration, 1mL of 1% lidocaine preservative free with 10 mg of dexamethasone was then injected at each level above, and the needles were removed intact after restyleted. The patient's back was covered with a 4x4 gauze, the area was cleansed with sterile normal saline, and a dressing was applied. There were no complications noted.     This was a challenging procedure given the patients Body mass index is 43.3 kg/m².. This required longer needles.  A typical procedure such as this would require 25g 3.5 inch needles.  This procedure required 22g 5 inch needles.  This added to the mental effort and challenge of image acquiring. This required additional time and effort.     The patient was then evaluated post-procedure, and was hemodynamically stable prior to leaving the post-operative care unit.     Follow-up as scheduled    Bro Mojica MD  Physical Medicine and Rehabilitation  Interventional Spine and Sports Physiatry  [facility]          CPT codes  Transforaminal epidural injection- lumbar or sacral (first level):  24123-56  Transforaminal epidural injection- lumbar or sacral (each additional level):  46564-91  moderate procedural sedation first 15 minutes: 16278

## 2020-11-17 ENCOUNTER — TELEPHONE (OUTPATIENT)
Dept: PHYSICAL MEDICINE AND REHAB | Facility: MEDICAL CENTER | Age: 45
End: 2020-11-17

## 2020-11-17 NOTE — TELEPHONE ENCOUNTER
Spoke to Pt in regards to her SP that was done with Dr. Mojica dated 11/16/2020 for her left L5-S1 and S1 transforaminal epidural steroid injection with fluoroscopic guidance with sedation and she stated that she just had a headache but other than that she's fine.    Thank you  Mariah

## 2020-12-14 ENCOUNTER — OFFICE VISIT (OUTPATIENT)
Dept: PHYSICAL MEDICINE AND REHAB | Facility: MEDICAL CENTER | Age: 45
End: 2020-12-14
Payer: COMMERCIAL

## 2020-12-14 VITALS
HEIGHT: 69 IN | BODY MASS INDEX: 43.4 KG/M2 | TEMPERATURE: 98 F | OXYGEN SATURATION: 96 % | SYSTOLIC BLOOD PRESSURE: 124 MMHG | DIASTOLIC BLOOD PRESSURE: 74 MMHG | HEART RATE: 79 BPM | WEIGHT: 293 LBS

## 2020-12-14 DIAGNOSIS — M51.26 LUMBAR DISC HERNIATION: ICD-10-CM

## 2020-12-14 DIAGNOSIS — Z74.09 IMPAIRED MOBILITY AND ADLS: ICD-10-CM

## 2020-12-14 DIAGNOSIS — M54.16 LUMBAR RADICULITIS: ICD-10-CM

## 2020-12-14 DIAGNOSIS — M54.50 CHRONIC LEFT-SIDED LOW BACK PAIN WITHOUT SCIATICA: ICD-10-CM

## 2020-12-14 DIAGNOSIS — R29.898 LEFT LEG WEAKNESS: ICD-10-CM

## 2020-12-14 DIAGNOSIS — G89.29 CHRONIC LEFT-SIDED LOW BACK PAIN WITHOUT SCIATICA: ICD-10-CM

## 2020-12-14 DIAGNOSIS — Z78.9 IMPAIRED MOBILITY AND ADLS: ICD-10-CM

## 2020-12-14 PROCEDURE — 99214 OFFICE O/P EST MOD 30 MIN: CPT | Performed by: PHYSICAL MEDICINE & REHABILITATION

## 2020-12-14 ASSESSMENT — PAIN SCALES - GENERAL: PAINLEVEL: 4=SLIGHT-MODERATE PAIN

## 2020-12-14 ASSESSMENT — PATIENT HEALTH QUESTIONNAIRE - PHQ9: CLINICAL INTERPRETATION OF PHQ2 SCORE: 0

## 2020-12-14 NOTE — PROGRESS NOTES
Follow up patient note  Interventional spine and sports physiatry, Physical medicine rehabilitation      Chief complaint:   Chief Complaint   Patient presents with   • Follow-Up     Back pain          HISTORY    Please see new patient note by Dr Mojica,  for more details.     HPI  Patient identification: José Luis Blackburn ,  1975,   With Diagnoses of Lumbar radiculitis, Chronic left-sided low back pain without sciatica, Impaired mobility and ADLs, Left leg weakness, and Lumbar disc herniation were pertinent to this visit.     Procedures:  2020 left Lumbar Transforaminal Epidural Steroid  at the L5-S1 and S1 levels 100% improvement in radicular component and 50% improvement in back pain    After the above epidural the patient is an excellent response with 100% improvement in the radiating pain down her legs.  Left leg weakness and numbness have resolved.  She still has left low back and buttocks pain aching in quality, constant, 4 out of 10 in intensity.  She will occasionally have difficulty with lower body dressing with putting her socks and shoes on but she can put on her pants easily and the remainder of lower body dressing is within normal limits.         ROS Red Flags :   Fever, Chills, Sweats: Denies  Involuntary Weight Loss: Denies  Bowel/Bladder Incontinence: Denies  Saddle Anesthesia: Denies        PMHx:   Past Medical History:   Diagnosis Date   • Allergy    • Anemia     with pregnancy   • Anxiety    • Arthritis    • Depression    • Headache(784.0)    • kid ston    • pac 2009    and PVCs   • Sciatica    • Seizure (HCC)     Petit mal until puberty   • Urinary tract infection, site not specified        PSHx:   Past Surgical History:   Procedure Laterality Date   • LUMBAR TRANSFORAMINAL EPIDURAL STEROID INJECTION Left 2020    Procedure: INJECTION, STEROID, SPINE, LUMBAR, EPIDURAL, TRANSFORAMINAL APPROACH;  Surgeon: Bro Mojica M.D.;  [facility];   "Service: Pain Management   • LITHOTRIPSY  2004       Family history   Family History   Problem Relation Age of Onset   • Arthritis Mother         Rheumatoid arthritis   • Diabetes Father    • Hypertension Father    • Hyperlipidemia Father    • Hypertension Brother    • Hypertension Maternal Grandmother    • Cancer Maternal Grandfather         mesothelioma from asbestos   • Hypertension Paternal Grandmother    • Cancer Paternal Grandfather         bone   • Hypertension Paternal Grandfather          Medications:   Outpatient Medications Marked as Taking for the 12/14/20 encounter (Office Visit) with Bro Mojica M.D.   Medication Sig Dispense Refill   • progesterone (PROMETRIUM) 200 MG capsule TAKE 1 CAPSULE BY MOUTH ONCE DAILY FOR 12 DAYS EVERY 3 4 MONTHS     • cyclobenzaprine (FLEXERIL) 10 MG Tab Take 1 Tab by mouth 3 times a day as needed. 90 Tab 3   • acetaminophen (TYLENOL) 500 MG Tab Take 500-1,000 mg by mouth every 6 hours as needed.          Current Outpatient Medications on File Prior to Visit   Medication Sig Dispense Refill   • progesterone (PROMETRIUM) 200 MG capsule TAKE 1 CAPSULE BY MOUTH ONCE DAILY FOR 12 DAYS EVERY 3 4 MONTHS     • cyclobenzaprine (FLEXERIL) 10 MG Tab Take 1 Tab by mouth 3 times a day as needed. 90 Tab 3   • acetaminophen (TYLENOL) 500 MG Tab Take 500-1,000 mg by mouth every 6 hours as needed.     • LORAZEPAM PO Take  by mouth.     • ibuprofen (MOTRIN) 600 MG Tab Take 600 mg by mouth every 6 hours as needed.       No current facility-administered medications on file prior to visit.          Allergies:   Allergies   Allergen Reactions   • Contrast Media With Iodine [Iodine]      Pt reports becoming SOB with contrast media with iodine in the past   • Gabapentin      Cognitive side effects    • Other Drug      \"some epilepsy medications, unsure of name\"       Social Hx:   Social History     Socioeconomic History   • Marital status:      Spouse name: Not on file   • Number of " "children: Not on file   • Years of education: Not on file   • Highest education level: Not on file   Occupational History   • Not on file   Social Needs   • Financial resource strain: Not on file   • Food insecurity     Worry: Not on file     Inability: Not on file   • Transportation needs     Medical: Not on file     Non-medical: Not on file   Tobacco Use   • Smoking status: Former Smoker     Years: 15.00     Types: Cigarettes   • Smokeless tobacco: Never Used   • Tobacco comment: Socially twice a month now   Substance and Sexual Activity   • Alcohol use: No   • Drug use: No   • Sexual activity: Yes     Partners: Male     Birth control/protection: Rhythm   Lifestyle   • Physical activity     Days per week: Not on file     Minutes per session: Not on file   • Stress: Not on file   Relationships   • Social connections     Talks on phone: Not on file     Gets together: Not on file     Attends Restoration service: Not on file     Active member of club or organization: Not on file     Attends meetings of clubs or organizations: Not on file     Relationship status: Not on file   • Intimate partner violence     Fear of current or ex partner: Not on file     Emotionally abused: Not on file     Physically abused: Not on file     Forced sexual activity: Not on file   Other Topics Concern   •  Service No   • Blood Transfusions No   • Caffeine Concern No   • Occupational Exposure No   • Hobby Hazards No   • Sleep Concern Yes   • Stress Concern No   • Weight Concern Yes   • Special Diet No   • Back Care Yes   • Exercise No   • Bike Helmet No   • Seat Belt Yes   • Self-Exams Yes   Social History Narrative   • Not on file         EXAMINATION     Physical Exam:   Vitals: /74 (BP Location: Left arm, Patient Position: Sitting, BP Cuff Size: Adult)   Pulse 79   Temp 36.7 °C (98 °F) (Temporal)   Ht 1.753 m (5' 9\")   Wt (!) 135.2 kg (298 lb 1 oz)   SpO2 96%     Constitutional:   Body Habitus: Body mass index is 44.02 " kg/m².  Cooperation: Fully cooperates with exam  Appearance: Well-groomed no disheveled    Respiratory-  breathing comfortable on room air, no audible wheezing  Cardiovascular- capillary refills less than 2 seconds. No lower extremity edema is noted.   Psychiatric- alert and oriented ×3. Normal affect.    MSK and Neuro: -    There are no signs of infection around the injection sites.   Mildly decreased active range of motion with flexion, lateral flexion, and rotation bilaterally.   There is full  active range of motion with lumbar extension.      Palpation:   No tenderness to palpation in midline at T1-T12 levels. No tenderness to palpation in the left and right of the midline T1-L5, NEGATIVE for tenderness to palpation to the para-midline region in the lower lumbar levels.  palpation over SI joint: negative bilaterally    palpation in hip or over the gluteus medius tendon insertion: negative bilaterally      Lumbar spine Special tests  Neuro tension  Straight leg test negative right, positive left    Slump test negative right, positive left      Key points for the international standards for neurological classification of spinal cord injury (ISNCSCI) to light touch.     Dermatome R L                                      L2 2 2   L3 2 2   L4 2 2   L5 2 2   S1 2 2   S2 2 2         Motor Exam Lower Extremities    ? Myotome R L   Hip flexion L2 5 5   Knee extension L3 5 5   Ankle dorsiflexion L4 5 5   Toe extension L5 5 5   Ankle plantarflexion S1 5 5                   MEDICAL DECISION MAKING    DATA    Labs:   No results found for: SODIUM, POTASSIUM, CHLORIDE, CO2, GLUCOSE, BUN, CREATININE, BUNCREATRAT, GLOMRATE     No results found for: PROTHROMBTM, INR     No results found for: WBC, RBC, HEMOGLOBIN, HEMATOCRIT, MCV, MCH, MCHC, MPV, NEUTSPOLYS, LYMPHOCYTES, MONOCYTES, EOSINOPHILS, BASOPHILS, HYPOCHROMIA, ANISOCYTOSIS     No results found for: HBA1C       Imaging:   I personally reviewed following images    I  reviewed the fluoroscopic images from 2020 showing successful left L5-S1 and S1 transforaminal epidural steroid injection.  I reviewed these with the patient on 2020.    MRI lumbar spine dated 10/16/2020  At L5-S1 there is a large left paracentral disc herniation with impingement on the descending left S1 nerve root.  At L4-5 there is a small disc protrusion with mild left neuroforaminal stenosis.    I reviewed the following radiology reports               Results for orders placed in visit on 10/16/20   MR-LUMBAR SPINE-W/O                                                                                                                                                                                                      Results for orders placed during the hospital encounter of 20   DX-LUMBAR SPINE-2 OR 3 VIEWS    Impression No significant spondylosis. No acute fracture or listhesis.                                           DIAGNOSIS   Visit Diagnoses     ICD-10-CM   1. Lumbar radiculitis  M54.16   2. Chronic left-sided low back pain without sciatica  M54.5    G89.29   3. Impaired mobility and ADLs  Z74.09    Z78.9   4. Left leg weakness  R29.898   5. Lumbar disc herniation  M51.26         ASSESSMENT and PLAN:     José Luis Marinelli Bere  1975 female      José Luis Marinelli was seen today for follow-up.    Diagnoses and all orders for this visit:    Lumbar radiculitis  Comments:  significantly improved after epidural, no without radiating pain  Orders:  -     REFERRAL TO PHYSICAL THERAPY    Chronic left-sided low back pain without sciatica  -     REFERRAL TO PHYSICAL THERAPY    Impaired mobility and ADLs  -     REFERRAL TO PHYSICAL THERAPY    Left leg weakness  Comments:  improved after epidural  Orders:  -     REFERRAL TO PHYSICAL THERAPY    Lumbar disc herniation  Comments:  improved after epidural  Orders:  -     REFERRAL TO PHYSICAL THERAPY         I believe now the patient would tolerate physical  therapy.       Tylenol 1000 mg up to 3 times daily.  Flexeril as needed.  Ibuprofen 600 mg up to 3 times daily.  Ideally the patient would not be on daily NSAIDs eventually but is reasonable for now.      Follow up: After the above procedure for diagnostic and therapeutic purposes    Thank you for allowing me to participate in the care of this patient. If you have any questions please not hesitate to contact me.             Please note that this dictation was created using voice recognition software. I have made every reasonable attempt to correct obvious errors but there may be errors of grammar and content that I may have overlooked prior to finalization of this note.      Bro Mojica MD  Interventional Spine and Sports Physiatry  Physical Medicine and Rehabilitation  [facility]

## 2021-01-06 ENCOUNTER — APPOINTMENT (OUTPATIENT)
Dept: PHYSICAL MEDICINE AND REHAB | Facility: MEDICAL CENTER | Age: 46
End: 2021-01-06
Payer: COMMERCIAL

## 2021-01-26 ENCOUNTER — OFFICE VISIT (OUTPATIENT)
Dept: PHYSICAL MEDICINE AND REHAB | Facility: MEDICAL CENTER | Age: 46
End: 2021-01-26
Payer: COMMERCIAL

## 2021-01-26 VITALS
BODY MASS INDEX: 43.4 KG/M2 | TEMPERATURE: 97.1 F | HEIGHT: 69 IN | OXYGEN SATURATION: 96 % | HEART RATE: 102 BPM | WEIGHT: 293 LBS | DIASTOLIC BLOOD PRESSURE: 76 MMHG | SYSTOLIC BLOOD PRESSURE: 118 MMHG

## 2021-01-26 DIAGNOSIS — M54.42 CHRONIC LEFT-SIDED LOW BACK PAIN WITH LEFT-SIDED SCIATICA: ICD-10-CM

## 2021-01-26 DIAGNOSIS — M51.26 LUMBAR DISC HERNIATION: ICD-10-CM

## 2021-01-26 DIAGNOSIS — Z78.9 IMPAIRED MOBILITY AND ADLS: ICD-10-CM

## 2021-01-26 DIAGNOSIS — Z74.09 IMPAIRED MOBILITY AND ADLS: ICD-10-CM

## 2021-01-26 DIAGNOSIS — M54.16 LUMBAR RADICULOPATHY: ICD-10-CM

## 2021-01-26 DIAGNOSIS — G89.29 CHRONIC LEFT-SIDED LOW BACK PAIN WITH LEFT-SIDED SCIATICA: ICD-10-CM

## 2021-01-26 PROCEDURE — 99214 OFFICE O/P EST MOD 30 MIN: CPT | Performed by: PHYSICAL MEDICINE & REHABILITATION

## 2021-01-26 RX ORDER — CYCLOBENZAPRINE HCL 10 MG
10 TABLET ORAL 3 TIMES DAILY PRN
Qty: 90 TAB | Refills: 3 | Status: SHIPPED | OUTPATIENT
Start: 2021-01-26 | End: 2021-09-04

## 2021-01-26 ASSESSMENT — PATIENT HEALTH QUESTIONNAIRE - PHQ9
CLINICAL INTERPRETATION OF PHQ2 SCORE: 1
SUM OF ALL RESPONSES TO PHQ QUESTIONS 1-9: 7
5. POOR APPETITE OR OVEREATING: 2 - MORE THAN HALF THE DAYS

## 2021-01-26 ASSESSMENT — PAIN SCALES - GENERAL: PAINLEVEL: 7=MODERATE-SEVERE PAIN

## 2021-01-27 NOTE — PATIENT INSTRUCTIONS
Please initiate core strengthening, stretching exercises for the lower extremities and the lumbar spine.  Incorporate these into a physical therapy program and provide the patient with a home exercise program as well.    Bro Mojica MD

## 2021-06-21 ENCOUNTER — TELEPHONE (OUTPATIENT)
Dept: PHYSICAL MEDICINE AND REHAB | Facility: MEDICAL CENTER | Age: 46
End: 2021-06-21

## 2021-06-21 NOTE — TELEPHONE ENCOUNTER
Representative from [facility] called on 6/17 to notify the office that there was no autherazation on file for the procedure the patient had done on 11/16/20. Some of the CPT codes would need a retro authorization code. As they have no authorization on file. The contact number she gave was [phone].     On 6/18 I called Karen Jackson ([phone]) and she called [employer], while she could not get back in touch with Bartolome someone else told her that if either our office or the patient got notice of then we could resubmit the claim.

## 2021-07-29 ENCOUNTER — OFFICE VISIT (OUTPATIENT)
Dept: PHYSICAL MEDICINE AND REHAB | Facility: MEDICAL CENTER | Age: 46
End: 2021-07-29
Payer: COMMERCIAL

## 2021-07-29 VITALS
OXYGEN SATURATION: 94 % | DIASTOLIC BLOOD PRESSURE: 80 MMHG | HEART RATE: 99 BPM | WEIGHT: 285.94 LBS | HEIGHT: 69 IN | SYSTOLIC BLOOD PRESSURE: 120 MMHG | BODY MASS INDEX: 42.35 KG/M2 | TEMPERATURE: 97 F

## 2021-07-29 DIAGNOSIS — M25.652 DECREASED RANGE OF MOTION OF BOTH HIPS: ICD-10-CM

## 2021-07-29 DIAGNOSIS — F41.9 ANXIETY: ICD-10-CM

## 2021-07-29 DIAGNOSIS — M25.651 DECREASED RANGE OF MOTION OF BOTH HIPS: ICD-10-CM

## 2021-07-29 DIAGNOSIS — M54.16 LUMBAR RADICULOPATHY: ICD-10-CM

## 2021-07-29 DIAGNOSIS — G89.29 CHRONIC LEFT-SIDED LOW BACK PAIN WITHOUT SCIATICA: ICD-10-CM

## 2021-07-29 DIAGNOSIS — M54.50 CHRONIC LEFT-SIDED LOW BACK PAIN WITHOUT SCIATICA: ICD-10-CM

## 2021-07-29 DIAGNOSIS — M25.552 CHRONIC LEFT HIP PAIN: ICD-10-CM

## 2021-07-29 DIAGNOSIS — M53.3 SACROILIAC JOINT DYSFUNCTION OF LEFT SIDE: ICD-10-CM

## 2021-07-29 DIAGNOSIS — G89.29 CHRONIC LEFT HIP PAIN: ICD-10-CM

## 2021-07-29 PROCEDURE — 99214 OFFICE O/P EST MOD 30 MIN: CPT | Performed by: PHYSICAL MEDICINE & REHABILITATION

## 2021-07-29 RX ORDER — LOSARTAN POTASSIUM 50 MG/1
50 TABLET ORAL DAILY
COMMUNITY
Start: 2021-07-23

## 2021-07-29 ASSESSMENT — PATIENT HEALTH QUESTIONNAIRE - PHQ9: CLINICAL INTERPRETATION OF PHQ2 SCORE: 0

## 2021-07-29 ASSESSMENT — PAIN SCALES - GENERAL: PAINLEVEL: 5=MODERATE PAIN

## 2021-07-29 NOTE — H&P (VIEW-ONLY)
Follow up patient note  Interventional spine and sports physiatry, Physical medicine rehabilitation      Chief complaint:   Chief Complaint   Patient presents with   • Follow-Up     Back pain          HISTORY    Please see new patient note by Dr Mojica,  for more details.     HPI  Patient identification: José Luis Blackburn ,  1975,   With Diagnoses of Sacroiliac joint dysfunction of left side, Lumbar radiculopathy, Chronic left-sided low back pain without sciatica, Chronic left hip pain, Decreased range of motion of both hips, and Anxiety were pertinent to this visit.     Procedures:  2020 left Lumbar Transforaminal Epidural Steroid  at the L5-S1 and S1 levels 100% improvement in radicular component and 50% improvement in back pain    Medications tried include gabapentin which she cannot take because of cognitive side effects.  NSAIDs including ibuprofen 600 mg.  Tylenol.  Flexeril.      The patient is in the process of losing weight.  She is trying to go on walks.  She is trying to exercise program.  She is also in a bariatric program with a goal of surgery and weight loss.    From medication standpoint she is using ibuprofen 600 mg now not during the day but she is using this at night.  Her pain is typically worse at night.  She no longer has radiating pain down the leg.  Majority the patient's pain is in the buttocks.  This does cause difficulty with sleep.  She reports that she is also using a medication for blood pressure which does make her tired but she uses this at night.  Because of this she is fearful to use a muscle relaxer at night.  She has not been using them.  She is also tried a provider driven home exercise program including the past 3 months.  This is included stretching, strengthening and a cardiovascular program including walking.          ROS Red Flags :   Fever, Chills, Sweats: Denies  Involuntary Weight Loss: Denies  Bowel/Bladder Incontinence: Denies  Saddle Anesthesia:  Denies        PMHx:   Past Medical History:   Diagnosis Date   • Allergy    • Anemia     with pregnancy   • Anxiety    • Arthritis    • Depression    • Headache(784.0)    • kid ston    • pac 2009    and PVCs   • Sciatica    • Seizure (HCC)     Petit mal until puberty   • Urinary tract infection, site not specified        PSHx:   Past Surgical History:   Procedure Laterality Date   • LUMBAR TRANSFORAMINAL EPIDURAL STEROID INJECTION Left 11/16/2020    Procedure: INJECTION, STEROID, SPINE, LUMBAR, EPIDURAL, TRANSFORAMINAL APPROACH;  Surgeon: Bro Mojica M.D.;  Location[facility];  Service: Pain Management   • LITHOTRIPSY  2004       Family history   Family History   Problem Relation Age of Onset   • Arthritis Mother         Rheumatoid arthritis   • Diabetes Father    • Hypertension Father    • Hyperlipidemia Father    • Hypertension Brother    • Hypertension Maternal Grandmother    • Cancer Maternal Grandfather         mesothelioma from asbestos   • Hypertension Paternal Grandmother    • Cancer Paternal Grandfather         bone   • Hypertension Paternal Grandfather          Medications:   Outpatient Medications Marked as Taking for the 7/29/21 encounter (Office Visit) with Bro Mojica M.D.   Medication Sig Dispense Refill   • losartan (COZAAR) 50 MG Tab      • COLLAGEN PO Take  by mouth.     • Cholecalciferol (VITAMIN D3 PO) Take 50,000 Units by mouth. Once a week     • progesterone (PROMETRIUM) 200 MG capsule TAKE 1 CAPSULE BY MOUTH ONCE DAILY FOR 12 DAYS EVERY 3 4 MONTHS     • acetaminophen (TYLENOL) 500 MG Tab Take 500-1,000 mg by mouth every 6 hours as needed.     • ibuprofen (MOTRIN) 600 MG Tab Take 600 mg by mouth every 6 hours as needed.          Current Outpatient Medications on File Prior to Visit   Medication Sig Dispense Refill   • losartan (COZAAR) 50 MG Tab      • COLLAGEN PO Take  by mouth.     • Cholecalciferol (VITAMIN D3 PO) Take 50,000 Units by mouth. Once a week     •  "progesterone (PROMETRIUM) 200 MG capsule TAKE 1 CAPSULE BY MOUTH ONCE DAILY FOR 12 DAYS EVERY 3 4 MONTHS     • acetaminophen (TYLENOL) 500 MG Tab Take 500-1,000 mg by mouth every 6 hours as needed.     • ibuprofen (MOTRIN) 600 MG Tab Take 600 mg by mouth every 6 hours as needed.     • cyclobenzaprine (FLEXERIL) 10 mg Tab Take 1 Tab by mouth 3 times a day as needed. (Patient not taking: Reported on 7/29/2021) 90 Tab 3   • LORAZEPAM PO Take  by mouth.       No current facility-administered medications on file prior to visit.         Allergies:   Allergies   Allergen Reactions   • Contrast Media With Iodine [Iodine]      Pt reports becoming SOB with contrast media with iodine in the past   • Gabapentin      Cognitive side effects    • Other Drug      \"some epilepsy medications, unsure of name\"       Social Hx:   Social History     Socioeconomic History   • Marital status:      Spouse name: Not on file   • Number of children: Not on file   • Years of education: Not on file   • Highest education level: Not on file   Occupational History   • Not on file   Tobacco Use   • Smoking status: Former Smoker     Years: 15.00     Types: Cigarettes   • Smokeless tobacco: Never Used   • Tobacco comment: Socially twice a month now   Substance and Sexual Activity   • Alcohol use: No   • Drug use: No   • Sexual activity: Yes     Partners: Male     Birth control/protection: Rhythm   Other Topics Concern   •  Service No   • Blood Transfusions No   • Caffeine Concern No   • Occupational Exposure No   • Hobby Hazards No   • Sleep Concern Yes   • Stress Concern No   • Weight Concern Yes   • Special Diet No   • Back Care Yes   • Exercise No   • Bike Helmet No   • Seat Belt Yes   • Self-Exams Yes   Social History Narrative   • Not on file     Social Determinants of Health     Financial Resource Strain:    • Difficulty of Paying Living Expenses:    Food Insecurity:    • Worried About Running Out of Food in the Last Year:    • " "Ran Out of Food in the Last Year:    Transportation Needs:    • Lack of Transportation (Medical):    • Lack of Transportation (Non-Medical):    Physical Activity:    • Days of Exercise per Week:    • Minutes of Exercise per Session:    Stress:    • Feeling of Stress :    Social Connections:    • Frequency of Communication with Friends and Family:    • Frequency of Social Gatherings with Friends and Family:    • Attends Restorationist Services:    • Active Member of Clubs or Organizations:    • Attends Club or Organization Meetings:    • Marital Status:    Intimate Partner Violence:    • Fear of Current or Ex-Partner:    • Emotionally Abused:    • Physically Abused:    • Sexually Abused:          EXAMINATION     Physical Exam:   Vitals: /80 (BP Location: Left arm, Patient Position: Sitting, BP Cuff Size: Adult)   Pulse 99   Temp 36.1 °C (97 °F) (Temporal)   Ht 1.753 m (5' 9\")   Wt (!) 130 kg (285 lb 15 oz)   SpO2 94%     Constitutional:   Body Habitus: Body mass index is 42.23 kg/m².  Cooperation: Fully cooperates with exam  Appearance: Well-groomed no disheveled    Respiratory-  breathing comfortable on room air, no audible wheezing  Cardiovascular- capillary refills less than 2 seconds. No lower extremity edema is noted.   Psychiatric- alert and oriented ×3. Normal affect.    MSK and Neuro: -      Thoracic/Lumbar Spine/Sacral Spine/Hips   There are no signs of infection around the injection sites.   full  active range of motion with flexion, lateral flexion, and rotation bilaterally.   There is full  active range of motion with lumbar extension.      Palpation:   No tenderness to palpation in midline at T1-T12 levels. No tenderness to palpation in the left and right of the midline T1-L5  palpation over SI joint: negative right, positive left    palpation in hip or over the gluteus medius tendon insertion: negative bilaterally      Grant's, thigh thrust, SI joint distraction SI joint compression are positive " on the left and negative on the right.  There is decreased range of motion of the bilateral hips.  FAIRs maneuver reproduces left posterior hip pain.  FAIRs maneuver is negative on the right.    Lumbar spine Special tests  Neuro tension  Straight leg test negative bilaterally    Slump test negative bilaterally      Key points for the international standards for neurological classification of spinal cord injury (ISNCSCI) to light touch.     Dermatome R L                                      L2 2 2   L3 2 2   L4 2 2   L5 2 2   S1 2 2   S2 2 2         Motor Exam Lower Extremities    ? Myotome R L   Hip flexion L2 5 5   Knee extension L3 5 5   Ankle dorsiflexion L4 5 5   Toe extension L5 5 5   Ankle plantarflexion S1 5 5               MEDICAL DECISION MAKING    DATA    Labs:   No results found for: SODIUM, POTASSIUM, CHLORIDE, CO2, GLUCOSE, BUN, CREATININE, BUNCREATRAT, GLOMRATE     No results found for: PROTHROMBTM, INR     No results found for: WBC, RBC, HEMOGLOBIN, HEMATOCRIT, MCV, MCH, MCHC, MPV, NEUTSPOLYS, LYMPHOCYTES, MONOCYTES, EOSINOPHILS, BASOPHILS, HYPOCHROMIA, ANISOCYTOSIS     No results found for: HBA1C       Imaging:   I personally reviewed following images    I reviewed the fluoroscopic images from 11/16/2020 showing successful left L5-S1 and S1 transforaminal epidural steroid injection.  I reviewed these with the patient on 12/14/2020.    MRI lumbar spine dated 10/16/2020  At L5-S1 there is a large left paracentral disc herniation with impingement on the descending left S1 nerve root.  At L4-5 there is a small disc protrusion with mild left neuroforaminal stenosis.    I reviewed the following radiology reports               Results for orders placed in visit on 10/16/20   MR-LUMBAR SPINE-W/O                                                                                                                                                                                                      Results for orders  placed during the hospital encounter of 20   DX-LUMBAR SPINE-2 OR 3 VIEWS    Impression No significant spondylosis. No acute fracture or listhesis.                                           DIAGNOSIS   Visit Diagnoses     ICD-10-CM   1. Sacroiliac joint dysfunction of left side  M53.3   2. Lumbar radiculopathy  M54.16   3. Chronic left-sided low back pain without sciatica  M54.5    G89.29   4. Chronic left hip pain  M25.552    G89.29   5. Decreased range of motion of both hips  M25.651    M25.652   6. Anxiety  F41.9         ASSESSMENT and PLAN:     José Luis Marinelli Bere  1975 female      José Luis Marinelli was seen today for follow-up.    Diagnoses and all orders for this visit:    Sacroiliac joint dysfunction of left side  -     REFERRAL TO PHYSICAL MEDICINE REHAB    Lumbar radiculopathy    Chronic left-sided low back pain without sciatica  -     DX-HIP-BILATERAL-WITH PELVIS-3/4 VIEWS; Future    Chronic left hip pain  -     DX-HIP-BILATERAL-WITH PELVIS-3/4 VIEWS; Future    Decreased range of motion of both hips  -     DX-HIP-BILATERAL-WITH PELVIS-3/4 VIEWS; Future    Anxiety         Patient has failed conservative treatments described above.    I have ordered a left sacroiliac joint injection for diagnostic and therapeutic purposes.  Given the patient's anxiety this will be done with sedation    The risks benefits and alternatives to this procedure were discussed and the patient wishes to proceed with the procedure. Risks include but are not limited to damage to surrounding structures, infection, bleeding, worsening of pain which can be permanent, weakness which can be permanent. Benefits include pain relief, improved function. Alternatives includes not doing the procedure.      With the decreased range of motion of the bilateral hips I have also ordered hip x-rays to investigate for underlying hip arthritis or hip impingement syndrome      Follow up: After the above diagnostic and therapeutic  procedure    Thank you for allowing me to participate in the care of this patient. If you have any questions please not hesitate to contact me.             Please note that this dictation was created using voice recognition software. I have made every reasonable attempt to correct obvious errors but there may be errors of grammar and content that I may have overlooked prior to finalization of this note.      Bro Mojica MD  Interventional Spine and Sports Physiatry  Physical Medicine and Rehabilitation  [facility]

## 2021-07-29 NOTE — PROGRESS NOTES
Follow up patient note  Interventional spine and sports physiatry, Physical medicine rehabilitation      Chief complaint:   Chief Complaint   Patient presents with   • Follow-Up     Back pain          HISTORY    Please see new patient note by Dr Mojica,  for more details.     HPI  Patient identification: José uLis Blackburn ,  1975,   With Diagnoses of Sacroiliac joint dysfunction of left side, Lumbar radiculopathy, Chronic left-sided low back pain without sciatica, Chronic left hip pain, Decreased range of motion of both hips, and Anxiety were pertinent to this visit.     Procedures:  2020 left Lumbar Transforaminal Epidural Steroid  at the L5-S1 and S1 levels 100% improvement in radicular component and 50% improvement in back pain    Medications tried include gabapentin which she cannot take because of cognitive side effects.  NSAIDs including ibuprofen 600 mg.  Tylenol.  Flexeril.      The patient is in the process of losing weight.  She is trying to go on walks.  She is trying to exercise program.  She is also in a bariatric program with a goal of surgery and weight loss.    From medication standpoint she is using ibuprofen 600 mg now not during the day but she is using this at night.  Her pain is typically worse at night.  She no longer has radiating pain down the leg.  Majority the patient's pain is in the buttocks.  This does cause difficulty with sleep.  She reports that she is also using a medication for blood pressure which does make her tired but she uses this at night.  Because of this she is fearful to use a muscle relaxer at night.  She has not been using them.  She is also tried a provider driven home exercise program including the past 3 months.  This is included stretching, strengthening and a cardiovascular program including walking.          ROS Red Flags :   Fever, Chills, Sweats: Denies  Involuntary Weight Loss: Denies  Bowel/Bladder Incontinence: Denies  Saddle Anesthesia:  Denies        PMHx:   Past Medical History:   Diagnosis Date   • Allergy    • Anemia     with pregnancy   • Anxiety    • Arthritis    • Depression    • Headache(784.0)    • kid ston    • pac 2009    and PVCs   • Sciatica    • Seizure (HCC)     Petit mal until puberty   • Urinary tract infection, site not specified        PSHx:   Past Surgical History:   Procedure Laterality Date   • LUMBAR TRANSFORAMINAL EPIDURAL STEROID INJECTION Left 11/16/2020    Procedure: INJECTION, STEROID, SPINE, LUMBAR, EPIDURAL, TRANSFORAMINAL APPROACH;  Surgeon: Bro Mojica M.D.;  [facility];  Service: Pain Management   • LITHOTRIPSY  2004       Family history   Family History   Problem Relation Age of Onset   • Arthritis Mother         Rheumatoid arthritis   • Diabetes Father    • Hypertension Father    • Hyperlipidemia Father    • Hypertension Brother    • Hypertension Maternal Grandmother    • Cancer Maternal Grandfather         mesothelioma from asbestos   • Hypertension Paternal Grandmother    • Cancer Paternal Grandfather         bone   • Hypertension Paternal Grandfather          Medications:   Outpatient Medications Marked as Taking for the 7/29/21 encounter (Office Visit) with Bro Mojica M.D.   Medication Sig Dispense Refill   • losartan (COZAAR) 50 MG Tab      • COLLAGEN PO Take  by mouth.     • Cholecalciferol (VITAMIN D3 PO) Take 50,000 Units by mouth. Once a week     • progesterone (PROMETRIUM) 200 MG capsule TAKE 1 CAPSULE BY MOUTH ONCE DAILY FOR 12 DAYS EVERY 3 4 MONTHS     • acetaminophen (TYLENOL) 500 MG Tab Take 500-1,000 mg by mouth every 6 hours as needed.     • ibuprofen (MOTRIN) 600 MG Tab Take 600 mg by mouth every 6 hours as needed.          Current Outpatient Medications on File Prior to Visit   Medication Sig Dispense Refill   • losartan (COZAAR) 50 MG Tab      • COLLAGEN PO Take  by mouth.     • Cholecalciferol (VITAMIN D3 PO) Take 50,000 Units by mouth. Once a week     •  "progesterone (PROMETRIUM) 200 MG capsule TAKE 1 CAPSULE BY MOUTH ONCE DAILY FOR 12 DAYS EVERY 3 4 MONTHS     • acetaminophen (TYLENOL) 500 MG Tab Take 500-1,000 mg by mouth every 6 hours as needed.     • ibuprofen (MOTRIN) 600 MG Tab Take 600 mg by mouth every 6 hours as needed.     • cyclobenzaprine (FLEXERIL) 10 mg Tab Take 1 Tab by mouth 3 times a day as needed. (Patient not taking: Reported on 7/29/2021) 90 Tab 3   • LORAZEPAM PO Take  by mouth.       No current facility-administered medications on file prior to visit.         Allergies:   Allergies   Allergen Reactions   • Contrast Media With Iodine [Iodine]      Pt reports becoming SOB with contrast media with iodine in the past   • Gabapentin      Cognitive side effects    • Other Drug      \"some epilepsy medications, unsure of name\"       Social Hx:   Social History     Socioeconomic History   • Marital status:      Spouse name: Not on file   • Number of children: Not on file   • Years of education: Not on file   • Highest education level: Not on file   Occupational History   • Not on file   Tobacco Use   • Smoking status: Former Smoker     Years: 15.00     Types: Cigarettes   • Smokeless tobacco: Never Used   • Tobacco comment: Socially twice a month now   Substance and Sexual Activity   • Alcohol use: No   • Drug use: No   • Sexual activity: Yes     Partners: Male     Birth control/protection: Rhythm   Other Topics Concern   •  Service No   • Blood Transfusions No   • Caffeine Concern No   • Occupational Exposure No   • Hobby Hazards No   • Sleep Concern Yes   • Stress Concern No   • Weight Concern Yes   • Special Diet No   • Back Care Yes   • Exercise No   • Bike Helmet No   • Seat Belt Yes   • Self-Exams Yes   Social History Narrative   • Not on file     Social Determinants of Health     Financial Resource Strain:    • Difficulty of Paying Living Expenses:    Food Insecurity:    • Worried About Running Out of Food in the Last Year:    • " "Ran Out of Food in the Last Year:    Transportation Needs:    • Lack of Transportation (Medical):    • Lack of Transportation (Non-Medical):    Physical Activity:    • Days of Exercise per Week:    • Minutes of Exercise per Session:    Stress:    • Feeling of Stress :    Social Connections:    • Frequency of Communication with Friends and Family:    • Frequency of Social Gatherings with Friends and Family:    • Attends Anabaptism Services:    • Active Member of Clubs or Organizations:    • Attends Club or Organization Meetings:    • Marital Status:    Intimate Partner Violence:    • Fear of Current or Ex-Partner:    • Emotionally Abused:    • Physically Abused:    • Sexually Abused:          EXAMINATION     Physical Exam:   Vitals: /80 (BP Location: Left arm, Patient Position: Sitting, BP Cuff Size: Adult)   Pulse 99   Temp 36.1 °C (97 °F) (Temporal)   Ht 1.753 m (5' 9\")   Wt (!) 130 kg (285 lb 15 oz)   SpO2 94%     Constitutional:   Body Habitus: Body mass index is 42.23 kg/m².  Cooperation: Fully cooperates with exam  Appearance: Well-groomed no disheveled    Respiratory-  breathing comfortable on room air, no audible wheezing  Cardiovascular- capillary refills less than 2 seconds. No lower extremity edema is noted.   Psychiatric- alert and oriented ×3. Normal affect.    MSK and Neuro: -      Thoracic/Lumbar Spine/Sacral Spine/Hips   There are no signs of infection around the injection sites.   full  active range of motion with flexion, lateral flexion, and rotation bilaterally.   There is full  active range of motion with lumbar extension.      Palpation:   No tenderness to palpation in midline at T1-T12 levels. No tenderness to palpation in the left and right of the midline T1-L5  palpation over SI joint: negative right, positive left    palpation in hip or over the gluteus medius tendon insertion: negative bilaterally      Grant's, thigh thrust, SI joint distraction SI joint compression are positive " on the left and negative on the right.  There is decreased range of motion of the bilateral hips.  FAIRs maneuver reproduces left posterior hip pain.  FAIRs maneuver is negative on the right.    Lumbar spine Special tests  Neuro tension  Straight leg test negative bilaterally    Slump test negative bilaterally      Key points for the international standards for neurological classification of spinal cord injury (ISNCSCI) to light touch.     Dermatome R L                                      L2 2 2   L3 2 2   L4 2 2   L5 2 2   S1 2 2   S2 2 2         Motor Exam Lower Extremities    ? Myotome R L   Hip flexion L2 5 5   Knee extension L3 5 5   Ankle dorsiflexion L4 5 5   Toe extension L5 5 5   Ankle plantarflexion S1 5 5               MEDICAL DECISION MAKING    DATA    Labs:   No results found for: SODIUM, POTASSIUM, CHLORIDE, CO2, GLUCOSE, BUN, CREATININE, BUNCREATRAT, GLOMRATE     No results found for: PROTHROMBTM, INR     No results found for: WBC, RBC, HEMOGLOBIN, HEMATOCRIT, MCV, MCH, MCHC, MPV, NEUTSPOLYS, LYMPHOCYTES, MONOCYTES, EOSINOPHILS, BASOPHILS, HYPOCHROMIA, ANISOCYTOSIS     No results found for: HBA1C       Imaging:   I personally reviewed following images    I reviewed the fluoroscopic images from 11/16/2020 showing successful left L5-S1 and S1 transforaminal epidural steroid injection.  I reviewed these with the patient on 12/14/2020.    MRI lumbar spine dated 10/16/2020  At L5-S1 there is a large left paracentral disc herniation with impingement on the descending left S1 nerve root.  At L4-5 there is a small disc protrusion with mild left neuroforaminal stenosis.    I reviewed the following radiology reports               Results for orders placed in visit on 10/16/20   MR-LUMBAR SPINE-W/O                                                                                                                                                                                                      Results for orders  placed during the hospital encounter of 20   DX-LUMBAR SPINE-2 OR 3 VIEWS    Impression No significant spondylosis. No acute fracture or listhesis.                                           DIAGNOSIS   Visit Diagnoses     ICD-10-CM   1. Sacroiliac joint dysfunction of left side  M53.3   2. Lumbar radiculopathy  M54.16   3. Chronic left-sided low back pain without sciatica  M54.5    G89.29   4. Chronic left hip pain  M25.552    G89.29   5. Decreased range of motion of both hips  M25.651    M25.652   6. Anxiety  F41.9         ASSESSMENT and PLAN:     José Luis Marinelli Bere  1975 female      José Luis Marinelli was seen today for follow-up.    Diagnoses and all orders for this visit:    Sacroiliac joint dysfunction of left side  -     REFERRAL TO [facility]ICAL MEDICINE REHAB    Lumbar radiculopathy    Chronic left-sided low back pain without sciatica  -     DX-HIP-BILATERAL-WITH PELVIS-3/4 VIEWS; Future    Chronic left hip pain  -     DX-HIP-BILATERAL-WITH PELVIS-3/4 VIEWS; Future    Decreased range of motion of both hips  -     DX-HIP-BILATERAL-WITH PELVIS-3/4 VIEWS; Future    Anxiety         Patient has failed conservative treatments described above.    I have ordered a left sacroiliac joint injection for diagnostic and therapeutic purposes.  Given the patient's anxiety this will be done with sedation    The risks benefits and alternatives to this procedure were discussed and the patient wishes to proceed with the procedure. Risks include but are not limited to damage to surrounding structures, infection, bleeding, worsening of pain which can be permanent, weakness which can be permanent. Benefits include pain relief, improved function. Alternatives includes not doing the procedure.      With the decreased range of motion of the bilateral hips I have also ordered hip x-rays to investigate for underlying hip arthritis or hip impingement syndrome      Follow up: After the above diagnostic and therapeutic  procedure    Thank you for allowing me to participate in the care of this patient. If you have any questions please not hesitate to contact me.             Please note that this dictation was created using voice recognition software. I have made every reasonable attempt to correct obvious errors but there may be errors of grammar and content that I may have overlooked prior to finalization of this note.      Bro Mojica MD  Interventional Spine and Sports Physiatry  Physical Medicine and Rehabilitation  [facility]

## 2021-07-29 NOTE — PATIENT INSTRUCTIONS
Referral Notes 10/28/2020  8:36 AM Karen Jackson - Scan on 10/28/2020  8:36 AM by Karen Jackson: [facility]AN for [id number], [id number], [id number]       Note    Referral information sent to the following:  [facility]     CPT Codes: 11423, 71564, 59765 - No Prior Authorization Required     Patient Care Coordination notes:  Ready to Schedule            .  Type Date User Summary Attachment   Submitted Authorization 10/27/2020 11:55 AM Karen Jackson - -   Note    Faxed authorization form to [facility] ([phone])            .  Type Date User Summary Attachment   Phone Call 10/26/2020  4:55 PM Karen Jackson - -   Note    Called [facility] ([phone]) and was told this has to be submitted by faxed form which will be sent to me.           Called [facility] ([phone]) and spoke to Elisabeth who said that no prior authorization is required because they are secondary.     Call Reference #: [id number]    [id number]                Your procedure will be at the [facility] special procedure suite.    [address] [place] [id number]       PRE-PROCEDURE INSTRUCTIONS  You may take your regular medications except:   · No Anti-inflammatories 5 days prior to your procedure. Anti-inflammatories include medicines such as  ibuprofen (Motrin, Advil), Excedrin, Naproxen (Aleve, Anaprox, Naprelan, Naprosyn), Celecoxib (Celebrex), Diclofenac (Voltaren-XR tab), and Meloxicam (Mobic).   · You can take the remainder of your pain medications as prescribed.   · If you are having a diagnostic procedure such as a medial branch block, do not use her pain meds on the day of the procedure  · No Glucophage or Metformin 24 hours before your procedure. You may resume next day after your procedure.  · Call the physiatry office if you are taking or prescribed anti-biotics within five days of procedure.  · Please ask provider if you are taking any new diabetes medication.  · CONTINUE TAKING BLOOD PRESSURE MEDICATIONS AS PRESCRIBED.  · Pain medications will not be  prescribed on the procedure day. Procedural pain medication may be used by your provider   · Call your doctor's office performing the procedure if you have a fever, chills, rash or new illness prior to your procedure    Anticoagulation/antiplatelet medications  No Blood thinning medications such as Coumadin, Xarelto, aspirin or Plavix 5 days prior to procedure unless your doctor said to continue these medications. Call your doctor if a new medication is prescribed in this class.     Restrictions for eating before procedure:   · If you are getting procedural sedation, then do not eat to for 8 hours prior to procedure appointment time. Do not drink fluids for four hours prior to your procedure time.   · If you are not having procedural sedation, then Skip the meal prior to your procedure. If you have a morning procedure then skip breakfast. If you have an afternoon procedure then skip lunch.   · You may drink clear liquids up to 2 hours prior to your procedure  · You must have a  the day of procedure to accompany you home.      POST PROCEDURE INSTRUCTIONS   · No heavy lifting, strenuous bending or strenuous exercise for 3 days after your procedure.  · No hot tubs, baths, swimming for 3 days after your procedure  · You can remove the bandage the day after the procedure.  · IF YOU RECEIVED A STEROID INJECTION. PLEASE NOTE THAT THERE MAY BE A DELAY FOR THE INJECTION TO START WORKING, THE DELAY MAY BE UP TO TWO WEEKS. IF YOU HAVE DIABETES, PLEASE NOTE THAT YOUR SUGAR LEVELS MAY BE ELEVATED FOR 1-2 DAYS AFTER A STEROID INJECTION.  THE STEROID MAY CAUSE TEMPORARY SYMPTOMS WHICH USUALLY RESOLVE ON THEIR OWN WITHIN 1 TO 2 DAYS INCLUDING FACIAL FLUSHING OR A FEELING OF WARMTH ON THE FACE, TEMPORARY INCREASES IN BLOOD SUGAR, INSOMNIA, INCREASED HUNGER  · IF YOU EXPERIENCE PROLONGED WEAKNESS LONGER THAN ONE DAY, BOWEL OR BLADDER INCONTINENCE THEN PLEASE CALL THE PHYSIATRY OFFICE.  · Your leg may feel heavy, weak and numb  for up to 1-2 days. Be very careful walking.   ·  You may resume normal activities 3 days after procedure.

## 2021-08-03 ENCOUNTER — TELEPHONE (OUTPATIENT)
Dept: PHYSICAL MEDICINE AND REHAB | Facility: MEDICAL CENTER | Age: 46
End: 2021-08-03

## 2021-08-03 NOTE — TELEPHONE ENCOUNTER
Patient will call back when she has a moment to confirm SP w/ Dr. Mojica, was currently unavailable.

## 2021-08-05 ENCOUNTER — HOSPITAL ENCOUNTER (OUTPATIENT)
Dept: RADIOLOGY | Facility: MEDICAL CENTER | Age: 46
End: 2021-08-05
Attending: PHYSICAL MEDICINE & REHABILITATION
Payer: COMMERCIAL

## 2021-08-09 ENCOUNTER — TELEPHONE (OUTPATIENT)
Dept: PHYSICAL MEDICINE AND REHAB | Facility: MEDICAL CENTER | Age: 46
End: 2021-08-09

## 2021-08-09 RX ORDER — MULTIVIT WITH MINERALS/LUTEIN
TABLET ORAL
COMMUNITY

## 2021-08-09 NOTE — TELEPHONE ENCOUNTER
Pt LVM and stated that she tried to call the nurse line in Rehab for her to get pre screen but until today she didn't receive any call back yet.  Notified Pt to call today and LVM again if in case she can't hold anyone.    Py has the number to call at [phone].    Thank you  Mariah

## 2021-08-10 ENCOUNTER — APPOINTMENT (OUTPATIENT)
Dept: RADIOLOGY | Facility: REHABILITATION | Age: 46
End: 2021-08-10
Attending: PHYSICAL MEDICINE & REHABILITATION
Payer: COMMERCIAL

## 2021-08-10 ENCOUNTER — HOSPITAL ENCOUNTER (OUTPATIENT)
Facility: REHABILITATION | Age: 46
End: 2021-08-10
Attending: PHYSICAL MEDICINE & REHABILITATION | Admitting: PHYSICAL MEDICINE & REHABILITATION
Payer: COMMERCIAL

## 2021-08-10 VITALS
DIASTOLIC BLOOD PRESSURE: 85 MMHG | WEIGHT: 281.97 LBS | RESPIRATION RATE: 14 BRPM | BODY MASS INDEX: 41.76 KG/M2 | SYSTOLIC BLOOD PRESSURE: 127 MMHG | OXYGEN SATURATION: 97 % | HEIGHT: 69 IN | TEMPERATURE: 98 F | HEART RATE: 75 BPM

## 2021-08-10 PROCEDURE — 99152 MOD SED SAME PHYS/QHP 5/>YRS: CPT

## 2021-08-10 PROCEDURE — A9585 GADOBUTROL INJECTION: HCPCS

## 2021-08-10 PROCEDURE — 700117 HCHG RX CONTRAST REV CODE 255

## 2021-08-10 PROCEDURE — 700111 HCHG RX REV CODE 636 W/ 250 OVERRIDE (IP)

## 2021-08-10 PROCEDURE — G0260 INJ FOR SACROILIAC JT ANESTH: HCPCS

## 2021-08-10 RX ORDER — GADOBUTROL 604.72 MG/ML
INJECTION INTRAVENOUS
Status: COMPLETED
Start: 2021-08-10 | End: 2021-08-10

## 2021-08-10 RX ORDER — MIDAZOLAM HYDROCHLORIDE 1 MG/ML
INJECTION INTRAMUSCULAR; INTRAVENOUS
Status: COMPLETED
Start: 2021-08-10 | End: 2021-08-10

## 2021-08-10 RX ORDER — DEXAMETHASONE SODIUM PHOSPHATE 10 MG/ML
INJECTION, SOLUTION INTRAMUSCULAR; INTRAVENOUS
Status: COMPLETED
Start: 2021-08-10 | End: 2021-08-10

## 2021-08-10 RX ORDER — LIDOCAINE HYDROCHLORIDE 10 MG/ML
INJECTION, SOLUTION EPIDURAL; INFILTRATION; INTRACAUDAL; PERINEURAL
Status: COMPLETED
Start: 2021-08-10 | End: 2021-08-10

## 2021-08-10 RX ADMIN — DEXAMETHASONE SODIUM PHOSPHATE 10 MG: 10 INJECTION, SOLUTION INTRAMUSCULAR; INTRAVENOUS at 08:23

## 2021-08-10 RX ADMIN — LIDOCAINE HYDROCHLORIDE 10 ML: 10 INJECTION, SOLUTION EPIDURAL; INFILTRATION; INTRACAUDAL; PERINEURAL at 08:21

## 2021-08-10 RX ADMIN — MIDAZOLAM HYDROCHLORIDE 1.5 MG: 1 INJECTION, SOLUTION INTRAMUSCULAR; INTRAVENOUS at 08:13

## 2021-08-10 RX ADMIN — GADOBUTROL 3 ML: 604.72 INJECTION INTRAVENOUS at 08:22

## 2021-08-10 ASSESSMENT — PAIN DESCRIPTION - PAIN TYPE: TYPE: CHRONIC PAIN

## 2021-08-10 NOTE — OR SURGEON
Immediate Post OP Note    Pre-Op Diagnosis Codes:     * Sacroiliac joint dysfunction of left side [M53.3]      Post-Op Diagnosis Codes:     * Sacroiliac joint dysfunction of left side [M53.3]      Procedure(s):  LEFT sacroiliac joint injection with sedation - Wound Class: Clean    Surgeon(s):  Bro Mojica M.D.    Anesthesiologist/Type of Anesthesia:  No anesthesia staff entered./Sedation    Surgical Staff:  Circulator: Preeti Brown R.N.  Scrub Person: Jessica Dunn C.N.A.  Radiology Technologist: Jose Glynn    Specimens removed if any:  * No specimens in log *    Estimated Blood Loss: None    Findings: None    Complications: None        8/10/2021 8:24 AM Bro Mojica M.D.

## 2021-08-10 NOTE — PROGRESS NOTES
Pt ambulated to recovery by PRABHA Álvarez, hand off reort received. VSS, pt drinking coffee and eating cookies w/o n/v. Dressing mid low back is cdi. Pt is alert and conversational. Assessed by MD and d/c'd per order. Ambulated to vehicle by PRABHA Herr.

## 2021-08-10 NOTE — INTERVAL H&P NOTE
H&P reviewed. The patient was examined and there are no changes to the H&P     Lungs clear to auscultation bilaterally.  No abdominal tenderness.  Heart regular rate and rhythm.  Vitals reviewed.    Proceed with the originally scheduled procedure.  The risks, benefits and alternatives were discussed.  The patient understands these.    Pre-Op Diagnosis Codes:     * Sacroiliac joint dysfunction of left side [M53.3]  Procedure(s):  LEFT sacroiliac joint injection with sedation    Bro Mojica MD  Physical Medicine and Rehabilitation  Interventional Spine and Sports Physiatry  [facility]

## 2021-08-10 NOTE — PROGRESS NOTES
0812 AM.     Onto procedure table  on prone positioned and necessary monitoring equipment connected ( oxygen, EKG pad,pulse oximeter, BP ) with the help by  team  ( CNA,  X-ray TECH and RN ). Hands supported with stool underneath headrest of the bed , lower extremities supported with pillow.    0820 AM.     Patient identified. Site and procedure confirmed and dustin by Dr. Mojica .Medication allergies. Reviewed pertinent medical history ( arthritis, arthralgia ).Timeout completed, team and patient agreed.

## 2021-08-10 NOTE — OP REPORT
Date of Service: 8/10/2021     Patient: José Luis Blackburn 45 y.o. female     MRN: [id number]     Physician/s: Bro Mojica MD    Pre-operative Diagnosis: Lumbosacral spondylosis, Sacroiliac dysfunction    Post-operative Diagnosis: Lumbosacral spondylosis, Sacroiliac dysfunction    Procedure: LEFT   Sacroiliac joint injection    Description of procedure:    The risks, benefits, and alternatives of the procedure were reviewed and discussed with the patient.  Written informed consent was freely obtained. A pre-procedural time-out was conducted by the physician verifying patient’s identity, procedure to be performed, procedure site and side, and allergy verification. Appropriate equipment was determined to be in place for the procedure.     Moderation sedation was achieved with Versed (1.5mg). Monitoring of the patients vital signs and respiratory status was provided by trained independent registered nurse during the entire course of the procedures and under my supervision and recoded in the patient’s medical record. The duration of sedation was over 10 minutes.       In the fluoroscopy suite the patient was placed in a prone position and the skin was prepped and draped in the usual sterile fashion. The fluoroscope was placed over the left  sacroiliac joint at the appropriate angle for joint entrance, and the target for injection was marked. A 27g needle was placed into each of the marked level(s), and approx 2cc of 1% Lidocaine was injected subcutaneously into the epidermal and dermal layers. The needle was removed. A 22g 5 inch needle was then placed down to the level of bone at the inferior/distal aspect of the joint. The needle was then retracted and carefully advanced into the joint capsule. The needle tip was then verified by a lateral view. In the AP view, contrast dye was used to highlight the entire joint line while the fluoroscope was running live. Following negative aspiration, approx 0.5mL of 1%  lidocaine with 0.5mL of 10mg/mL dexamethasone was then injected. The needle was removed intact after restyleted. The patient's low back was covered with a 4x4 gauze, the area was cleansed with sterile normal saline, and a dressing was applied. There were no complications noted.     She had 100% pain relief in the left sacroiliac joint post procedure with sitting standing and walking.    Follow-up as scheduled    Bro Mojica MD  Physical Medicine and Rehabilitation  Interventional Spine and Sports Physiatry  [facility]  8/10/2021        CPT  sacroiliac joint with fluoroscopy 11197   moderate procedural sedation first 15 minutes: [id number]

## 2021-08-10 NOTE — PROGRESS NOTES
Pt given verbal and written d/c instructions including verbal infection prevention information and s/s of post procedure infection and verbalizes understanding. denies nsaid use or blood thinners use in last 5 days, denies current infection or abx use. Med rec complete. Pt has post procedural ride home with Won.

## 2021-08-11 ENCOUNTER — PATIENT MESSAGE (OUTPATIENT)
Dept: PHYSICAL MEDICINE AND REHAB | Facility: MEDICAL CENTER | Age: 46
End: 2021-08-11

## 2021-08-11 NOTE — PATIENT COMMUNICATION
Tried to call us back in regards to her SP that was done with Dr. Mojica dated 8/10/21 for her left sacroiliac joint injection with sedation.

## 2021-09-04 ENCOUNTER — OFFICE VISIT (OUTPATIENT)
Dept: URGENT CARE | Facility: PHYSICIAN GROUP | Age: 46
End: 2021-09-04
Payer: COMMERCIAL

## 2021-09-04 VITALS
BODY MASS INDEX: 42.21 KG/M2 | TEMPERATURE: 101.2 F | DIASTOLIC BLOOD PRESSURE: 82 MMHG | RESPIRATION RATE: 18 BRPM | SYSTOLIC BLOOD PRESSURE: 130 MMHG | HEIGHT: 69 IN | OXYGEN SATURATION: 97 % | WEIGHT: 285 LBS | HEART RATE: 80 BPM

## 2021-09-04 DIAGNOSIS — U07.1 COVID-19 VIRUS INFECTION: ICD-10-CM

## 2021-09-04 PROCEDURE — 99213 OFFICE O/P EST LOW 20 MIN: CPT | Mod: CS | Performed by: FAMILY MEDICINE

## 2021-09-04 RX ORDER — ALBUTEROL SULFATE 90 UG/1
2 AEROSOL, METERED RESPIRATORY (INHALATION) EVERY 6 HOURS PRN
Qty: 8.5 G | Refills: 0 | Status: SHIPPED | OUTPATIENT
Start: 2021-09-04

## 2021-09-04 RX ORDER — DEXTROMETHORPHAN HYDROBROMIDE AND PROMETHAZINE HYDROCHLORIDE 15; 6.25 MG/5ML; MG/5ML
5 SYRUP ORAL 4 TIMES DAILY PRN
Qty: 120 ML | Refills: 0 | Status: SHIPPED | OUTPATIENT
Start: 2021-09-04

## 2021-09-04 ASSESSMENT — ENCOUNTER SYMPTOMS
VOMITING: 0
SHORTNESS OF BREATH: 1
FEVER: 1
COUGH: 1

## 2021-09-04 NOTE — PROGRESS NOTES
Subjective:     José Luis Blackburn is a 45 y.o. female who presents for Cough (patient is covid +, she is having a cough aches, )    HPI  Pt presents for evaluation of an acute problem  Patient with cough and body aches  Patient is known Covid positive   Having constant SOB   Has cough frequently   Not sleeping well due to cough     Review of Systems   Constitutional: Positive for fever.   Respiratory: Positive for cough and shortness of breath.    Gastrointestinal: Negative for vomiting.   Skin: Negative for rash.       PMH:  has a past medical history of Allergy, Anemia, Anxiety, Arthritis, Depression, Headache(784.0), kid ston, pac (2009), Sciatica, Seizure (HCC), and Urinary tract infection, site not specified. She also has no past medical history of Addisons disease (HCC), Adrenal disorder (HCC), ASTHMA, Blood transfusion, Cancer (HCC), CATARACT, CHF (congestive heart failure) (HCC), Clotting disorder (HCC), COPD, Cushings syndrome (HCC), Diabetes, EMPHYSEMA, GERD (gastroesophageal reflux disease), Glaucoma, Goiter, Heart attack (HCC), Heart murmur, HIV (human immunodeficiency virus infection), Hyperlipidemia, Hypertension, IBD (inflammatory bowel disease), Meningitis, Migraine, Muscle disorder, Parathyroid disorder (HCC), Pituitary disease (HCC), Stroke (HCC), Substance abuse (HCC), Thyroid disease, Tuberculosis, or Ulcer.  MEDS:   Current Outpatient Medications:   •  Ascorbic Acid (VITAMIN C) 1000 MG Tab, Take  by mouth., Disp: , Rfl:   •  losartan (COZAAR) 50 MG Tab, , Disp: , Rfl:   •  COLLAGEN PO, Take  by mouth., Disp: , Rfl:   •  Cholecalciferol (VITAMIN D3 PO), Take 50,000 Units by mouth. Once a week, Disp: , Rfl:   •  progesterone (PROMETRIUM) 200 MG capsule, TAKE 1 CAPSULE BY MOUTH ONCE DAILY FOR 12 DAYS EVERY 3 4 MONTHS, Disp: , Rfl:   •  acetaminophen (TYLENOL) 500 MG Tab, Take 500-1,000 mg by mouth every 6 hours as needed., Disp: , Rfl:   •  LORAZEPAM PO, Take  by mouth., Disp: , Rfl:   •   "ibuprofen (MOTRIN) 600 MG Tab, Take 600 mg by mouth every 6 hours as needed., Disp: , Rfl:   •  cyclobenzaprine (FLEXERIL) 10 mg Tab, Take 1 Tab by mouth 3 times a day as needed. (Patient not taking: Reported on 9/4/2021), Disp: 90 Tab, Rfl: 3  ALLERGIES:   Allergies   Allergen Reactions   • Contrast Media With Iodine [Iodine]      Pt reports becoming SOB with contrast media with iodine in the past   • Gabapentin      Cognitive side effects    • Other Drug      \"some epilepsy medications, unsure of name\"     SURGHX:   Past Surgical History:   Procedure Laterality Date   • PB INJECTION,SACROILIAC JOINT Left 8/10/2021    Procedure: LEFT sacroiliac joint injection with sedation;  Surgeon: Bro Mojica M.D.;  [facility];  Service: Pain Management   • LUMBAR TRANSFORAMINAL EPIDURAL STEROID INJECTION Left 11/16/2020    Procedure: INJECTION, STEROID, SPINE, LUMBAR, EPIDURAL, TRANSFORAMINAL APPROACH;  Surgeon: Bro Mojica M.D.;  [facility];  Service: Pain Management   • LITHOTRIPSY  2004     SOCHX:  reports that she has quit smoking. Her smoking use included cigarettes. She quit after 15.00 years of use. She has never used smokeless tobacco. She reports that she does not drink alcohol and does not use drugs.     Objective:   /82   Pulse 80   Temp (!) 38.4 °C (101.2 °F) (Temporal)   Resp 18   Ht 1.753 m (5' 9\")   Wt (!) 129 kg (285 lb)   SpO2 97%   BMI 42.09 kg/m²     Physical Exam  Constitutional:       General: She is not in acute distress.     Appearance: She is well-developed. She is not diaphoretic.   HENT:      Head: Normocephalic and atraumatic.      Right Ear: Tympanic membrane, ear canal and external ear normal.      Left Ear: Tympanic membrane, ear canal and external ear normal.      Nose: Congestion present.      Mouth/Throat:      Mouth: Mucous membranes are moist.      Pharynx: Oropharynx is clear. No oropharyngeal exudate or " posterior oropharyngeal erythema.   Neck:      Trachea: No tracheal deviation.   Cardiovascular:      Rate and Rhythm: Normal rate and regular rhythm.   Pulmonary:      Effort: Pulmonary effort is normal. No respiratory distress.      Breath sounds: Normal breath sounds. No wheezing or rales.   Skin:     General: Skin is warm and dry.      Findings: No rash.   Neurological:      Mental Status: She is alert.         Assessment/Plan:   Assessment    1. COVID-19 virus infection  - albuterol 108 (90 Base) MCG/ACT Aero Soln inhalation aerosol; Inhale 2 Puffs every 6 hours as needed for Shortness of Breath.  Dispense: 8.5 g; Refill: 0  - promethazine-dextromethorphan (PROMETHAZINE-DM) 6.25-15 MG/5ML syrup; Take 5 mL by mouth 4 times a day as needed for Cough.  Dispense: 120 mL; Refill: 0    Patient with COVID-19 infection.  Clear lungs, no hypoxia, and handling symptoms okay.  Continues to have fever.  No signs of secondary bacterial infection on exam.  Given albuterol inhaler and stronger cough medication for nighttime use.  Reviewed follow-up precautions and ER precautions follow-up as needed.,

## 2021-09-10 ENCOUNTER — HOSPITAL ENCOUNTER (OUTPATIENT)
Dept: CARDIOLOGY | Facility: MEDICAL CENTER | Age: 46
End: 2021-09-10
Attending: COLON & RECTAL SURGERY
Payer: COMMERCIAL

## 2021-09-10 DIAGNOSIS — Z01.810 PRE-OPERATIVE CARDIOVASCULAR EXAMINATION: ICD-10-CM

## 2021-09-10 LAB — EKG IMPRESSION: NORMAL

## 2021-09-16 ENCOUNTER — OFFICE VISIT (OUTPATIENT)
Dept: PHYSICAL MEDICINE AND REHAB | Facility: MEDICAL CENTER | Age: 46
End: 2021-09-16
Payer: COMMERCIAL

## 2021-09-16 VITALS
WEIGHT: 285.72 LBS | BODY MASS INDEX: 42.32 KG/M2 | OXYGEN SATURATION: 98 % | HEIGHT: 69 IN | HEART RATE: 82 BPM | SYSTOLIC BLOOD PRESSURE: 122 MMHG | DIASTOLIC BLOOD PRESSURE: 74 MMHG | RESPIRATION RATE: 20 BRPM | TEMPERATURE: 97.9 F

## 2021-09-16 DIAGNOSIS — M53.3 SACROILIAC JOINT DYSFUNCTION OF LEFT SIDE: ICD-10-CM

## 2021-09-16 DIAGNOSIS — M54.16 LUMBAR RADICULOPATHY: ICD-10-CM

## 2021-09-16 DIAGNOSIS — M25.552 CHRONIC LEFT HIP PAIN: ICD-10-CM

## 2021-09-16 DIAGNOSIS — M16.10 HIP ARTHRITIS: ICD-10-CM

## 2021-09-16 DIAGNOSIS — G89.29 CHRONIC LEFT HIP PAIN: ICD-10-CM

## 2021-09-16 DIAGNOSIS — M51.26 LUMBAR DISC HERNIATION: ICD-10-CM

## 2021-09-16 DIAGNOSIS — M25.859 HIP IMPINGEMENT SYNDROME, UNSPECIFIED LATERALITY: ICD-10-CM

## 2021-09-16 PROCEDURE — 99214 OFFICE O/P EST MOD 30 MIN: CPT | Performed by: PHYSICAL MEDICINE & REHABILITATION

## 2021-09-16 ASSESSMENT — PAIN SCALES - GENERAL: PAINLEVEL: 2=MINIMAL-SLIGHT

## 2021-09-16 NOTE — PROGRESS NOTES
Follow up patient note  Interventional spine and sports physiatry, Physical medicine rehabilitation      Chief complaint:   Chief Complaint   Patient presents with   • Follow-Up     Post SP/Back pain           HISTORY    Please see new patient note by Dr Mojica,  for more details.     HPI  Patient identification: José Luis Blackburn ,  1975,   With Diagnoses of Hip impingement syndrome, bilateral, Chronic left hip pain, Hip arthritis, Sacroiliac joint dysfunction of left side, Lumbar radiculopathy, and Lumbar disc herniation were pertinent to this visit.     Procedures:  2020 left Lumbar Transforaminal Epidural Steroid  at the L5-S1 and S1 levels 100% improvement in radicular component and 50% improvement in back pain  8/10/2021 left sacroiliac joint injection with 50% pain relief    Medications tried include gabapentin which she cannot take because of cognitive side effects.  NSAIDs including ibuprofen 600 mg.  Tylenol.  Flexeril.      After the injection the patient had a significant improvement in her pain around the sacroiliac joint.  She has been able to reduce her dosage of the NSAIDs during the day and is only using NSAIDs at night.  She does have pain in the hip anterolateral and posterior hip worse with bed mobility in the left hip.  Causing 8 out of 10 intensity when present which is essentially with any bed mobility movements or with her stretches involving the hip joint.  She has been also having a difficult time going on walks because of this.  She is in the process of losing weight and going for bariatric surgery.        ROS Red Flags :   Fever, Chills, Sweats: Denies  Involuntary Weight Loss: Denies  Bowel/Bladder Incontinence: Denies  Saddle Anesthesia: Denies        PMHx:   Past Medical History:   Diagnosis Date   • Allergy    • Anemia     with pregnancy   • Anxiety    • Arthritis    • Depression    • Headache(784.0)    • kid ston    • pac 2009    and PVCs   • Sciatica    •  Seizure (HCC)     Petit mal until puberty   • Urinary tract infection, site not specified        PSHx:   Past Surgical History:   Procedure Laterality Date   • PB INJECTION,SACROILIAC JOINT Left 8/10/2021    Procedure: LEFT sacroiliac joint injection with sedation;  Surgeon: Bro Mojica M.D.;  [facility];  Service: Pain Management   • LUMBAR TRANSFORAMINAL EPIDURAL STEROID INJECTION Left 11/16/2020    Procedure: INJECTION, STEROID, SPINE, LUMBAR, EPIDURAL, TRANSFORAMINAL APPROACH;  Surgeon: Bro Mojica M.D.;  [facility];  Service: Pain Management   • LITHOTRIPSY  2004       Family history   Family History   Problem Relation Age of Onset   • Arthritis Mother         Rheumatoid arthritis   • Diabetes Father    • Hypertension Father    • Hyperlipidemia Father    • Hypertension Brother    • Hypertension Maternal Grandmother    • Cancer Maternal Grandfather         mesothelioma from asbestos   • Hypertension Paternal Grandmother    • Cancer Paternal Grandfather         bone   • Hypertension Paternal Grandfather          Medications:   Outpatient Medications Marked as Taking for the 9/16/21 encounter (Office Visit) with Bro Mojica M.D.   Medication Sig Dispense Refill   • albuterol 108 (90 Base) MCG/ACT Aero Soln inhalation aerosol Inhale 2 Puffs every 6 hours as needed for Shortness of Breath. 8.5 g 0   • promethazine-dextromethorphan (PROMETHAZINE-DM) 6.25-15 MG/5ML syrup Take 5 mL by mouth 4 times a day as needed for Cough. 120 mL 0   • Ascorbic Acid (VITAMIN C) 1000 MG Tab Take  by mouth.     • losartan (COZAAR) 50 MG Tab Take 50 mg by mouth every day.     • COLLAGEN PO Take  by mouth.     • Cholecalciferol (VITAMIN D3 PO) Take 50,000 Units by mouth. Once a week     • progesterone (PROMETRIUM) 200 MG capsule TAKE 1 CAPSULE BY MOUTH ONCE DAILY FOR 12 DAYS EVERY 3 4 MONTHS     • LORAZEPAM PO Take  by mouth.     • ibuprofen (MOTRIN) 600 MG Tab Take  "600 mg by mouth every 6 hours as needed.          Current Outpatient Medications on File Prior to Visit   Medication Sig Dispense Refill   • albuterol 108 (90 Base) MCG/ACT Aero Soln inhalation aerosol Inhale 2 Puffs every 6 hours as needed for Shortness of Breath. 8.5 g 0   • promethazine-dextromethorphan (PROMETHAZINE-DM) 6.25-15 MG/5ML syrup Take 5 mL by mouth 4 times a day as needed for Cough. 120 mL 0   • Ascorbic Acid (VITAMIN C) 1000 MG Tab Take  by mouth.     • losartan (COZAAR) 50 MG Tab Take 50 mg by mouth every day.     • COLLAGEN PO Take  by mouth.     • Cholecalciferol (VITAMIN D3 PO) Take 50,000 Units by mouth. Once a week     • progesterone (PROMETRIUM) 200 MG capsule TAKE 1 CAPSULE BY MOUTH ONCE DAILY FOR 12 DAYS EVERY 3 4 MONTHS     • LORAZEPAM PO Take  by mouth.     • ibuprofen (MOTRIN) 600 MG Tab Take 600 mg by mouth every 6 hours as needed.     • acetaminophen (TYLENOL) 500 MG Tab Take 500-1,000 mg by mouth every 6 hours as needed. (Patient not taking: Reported on 9/16/2021)       No current facility-administered medications on file prior to visit.         Allergies:   Allergies   Allergen Reactions   • Contrast Media With Iodine [Iodine]      Pt reports becoming SOB with contrast media with iodine in the past   • Gabapentin      Cognitive side effects    • Other Drug      \"some epilepsy medications, unsure of name\"       Social Hx:   Social History     Socioeconomic History   • Marital status:      Spouse name: Not on file   • Number of children: Not on file   • Years of education: Not on file   • Highest education level: Not on file   Occupational History   • Not on file   Tobacco Use   • Smoking status: Former Smoker     Years: 15.00     Types: Cigarettes   • Smokeless tobacco: Never Used   • Tobacco comment: Socially twice a month now   Substance and Sexual Activity   • Alcohol use: No   • Drug use: No   • Sexual activity: Yes     Partners: Male     Birth control/protection: Rhythm " "  Other Topics Concern   •  Service No   • Blood Transfusions No   • Caffeine Concern No   • Occupational Exposure No   • Hobby Hazards No   • Sleep Concern Yes   • Stress Concern No   • Weight Concern Yes   • Special Diet No   • Back Care Yes   • Exercise No   • Bike Helmet No   • Seat Belt Yes   • Self-Exams Yes   Social History Narrative   • Not on file     Social Determinants of Health     Financial Resource Strain:    • Difficulty of Paying Living Expenses:    Food Insecurity:    • Worried About Running Out of Food in the Last Year:    • Ran Out of Food in the Last Year:    Transportation Needs:    • Lack of Transportation (Medical):    • Lack of Transportation (Non-Medical):    Physical Activity:    • Days of Exercise per Week:    • Minutes of Exercise per Session:    Stress:    • Feeling of Stress :    Social Connections:    • Frequency of Communication with Friends and Family:    • Frequency of Social Gatherings with Friends and Family:    • Attends Islam Services:    • Active Member of Clubs or Organizations:    • Attends Club or Organization Meetings:    • Marital Status:    Intimate Partner Violence:    • Fear of Current or Ex-Partner:    • Emotionally Abused:    • Physically Abused:    • Sexually Abused:          EXAMINATION     Physical Exam:   Vitals: /74 (BP Location: Right arm, Patient Position: Sitting, BP Cuff Size: Adult)   Pulse 82   Temp 36.6 °C (97.9 °F) (Temporal)   Resp 20   Ht 1.753 m (5' 9.02\")   Wt (!) 130 kg (285 lb 11.5 oz)   SpO2 98%     Constitutional:   Body Habitus: Body mass index is 42.17 kg/m².  Cooperation: Fully cooperates with exam  Appearance: Well-groomed no disheveled    Respiratory-  breathing comfortable on room air, no audible wheezing  Cardiovascular- capillary refills less than 2 seconds. No lower extremity edema is noted.   Psychiatric- alert and oriented ×3. Normal affect.    MSK and Neuro: -    Strength is 5 out of 5 in the bilateral lower " extremities.  Sensation is intact.    FAIRs maneuver is positive on the left and negative on the right.  Decreased range of motion the bilateral hips    Grant's maneuver, thigh thrust, SI joint distraction and compression are negative bilaterally      MEDICAL DECISION MAKING    DATA    Labs:   No results found for: SODIUM, POTASSIUM, CHLORIDE, CO2, GLUCOSE, BUN, CREATININE, BUNCREATRAT, GLOMRATE     No results found for: PROTHROMBTM, INR     No results found for: WBC, RBC, HEMOGLOBIN, HEMATOCRIT, MCV, MCH, MCHC, MPV, NEUTSPOLYS, LYMPHOCYTES, MONOCYTES, EOSINOPHILS, BASOPHILS, HYPOCHROMIA, ANISOCYTOSIS     No results found for: HBA1C       Imaging:   I personally reviewed following images  X-ray hips 8/4/2021 with significant prominence of the anterolateral portion of the head neck junction of the bilateral hips consistent with hip impingement syndrome.    I reviewed the fluoroscopic images from 11/16/2020 showing successful left L5-S1 and S1 transforaminal epidural steroid injection.  I reviewed these with the patient on 12/14/2020.    MRI lumbar spine dated 10/16/2020  At L5-S1 there is a large left paracentral disc herniation with impingement on the descending left S1 nerve root.  At L4-5 there is a small disc protrusion with mild left neuroforaminal stenosis.    I reviewed the following radiology reports    XR hip                 Results for orders placed in visit on 10/16/20   MR-LUMBAR SPINE-W/O                                                                                                                                                                                                      Results for orders placed during the hospital encounter of 03/16/20   DX-LUMBAR SPINE-2 OR 3 VIEWS    Impression No significant spondylosis. No acute fracture or listhesis.                                           DIAGNOSIS   Visit Diagnoses     ICD-10-CM   1. Hip impingement syndrome, bilateral  M25.859   2. Chronic left hip  pain  M25.552    G89.29   3. Hip arthritis  M16.10   4. Sacroiliac joint dysfunction of left side  M53.3   5. Lumbar radiculopathy  M54.16   6. Lumbar disc herniation  M51.26         ASSESSMENT and PLAN:     José Luis Marinelli Bere  1975 female      José Luis Marinelli was seen today for follow-up.    Diagnoses and all orders for this visit:    Hip impingement syndrome, bilateral  -     REFERRAL TO PHYSICAL MEDICINE REHAB    Chronic left hip pain  -     REFERRAL TO PHYSICAL MEDICINE REHAB    Hip arthritis  -     REFERRAL TO PHYSICAL MEDICINE REHAB    Sacroiliac joint dysfunction of left side    Lumbar radiculopathy    Lumbar disc herniation      For now she can use ibuprofen 600 mg nightly as needed pain however after her bariatric surgery she would likely have to stop all NSAIDs.  She can also try Tylenol 1000 mg nightly.     She is in the process of going through bariatric surgery.  We discussed the importance of weight loss especially in regards to her back and hips with potential surgery in the future.    SI joint pain dramatically improved and she has been able to decrease her usage of NSAIDs during the day.  However she still has a significant portion of patient's pain coming from left hip joint.    There is significant hip impingement syndrome in the bilateral hips.      Given that the patient would not be able to have surgery on her hips for quite some time she would need to complete her bariatric surgery as well as weight loss, it is reasonable for hip injection for diagnostic and therapeutic purposes.    I have ordered a left intra-articular hip injection with fluoroscopic guidance for diagnostic and therapeutic purposes.    The risks benefits and alternatives to this procedure were discussed and the patient wishes to proceed with the procedure. Risks include but are not limited to damage to surrounding structures, infection, bleeding, worsening of pain which can be permanent, weakness which can be  permanent. Benefits include pain relief, improved function. Alternatives includes not doing the procedure.        Follow up: After the above diagnostic and therapeutic procedure    Thank you for allowing me to participate in the care of this patient. If you have any questions please not hesitate to contact me.             Please note that this dictation was created using voice recognition software. I have made every reasonable attempt to correct obvious errors but there may be errors of grammar and content that I may have overlooked prior to finalization of this note.      Bro Mojica MD  Interventional Spine and Sports Physiatry  Physical Medicine and Rehabilitation  [facility]

## 2021-09-16 NOTE — PATIENT INSTRUCTIONS
Your procedure will be at the [facility] special procedure suite.    [facility] [place] [address]       PRE-PROCEDURE INSTRUCTIONS  You may take your regular medications except:   · No Anti-inflammatories 5 days prior to your procedure. Anti-inflammatories include medicines such as  ibuprofen (Motrin, Advil), Excedrin, Naproxen (Aleve, Anaprox, Naprelan, Naprosyn), Celecoxib (Celebrex), Diclofenac (Voltaren-XR tab), and Meloxicam (Mobic).   · You can take the remainder of your pain medications as prescribed.   · If you are having a diagnostic procedure such as a medial branch block, do not use her pain meds on the day of the procedure  · No Glucophage or Metformin 24 hours before your procedure. You may resume next day after your procedure.  · Call the physiatry office if you are taking or prescribed anti-biotics within five days of procedure.  · Please ask provider if you are taking any new diabetes medication.  · CONTINUE TAKING BLOOD PRESSURE MEDICATIONS AS PRESCRIBED.  · Pain medications will not be prescribed on the procedure day. Procedural pain medication may be used by your provider   · Call your doctor's office performing the procedure if you have a fever, chills, rash or new illness prior to your procedure    Anticoagulation/antiplatelet medications  No Blood thinning medications such as Coumadin, Xarelto, aspirin or Plavix 5 days prior to procedure unless your doctor said to continue these medications. Call your doctor if a new medication is prescribed in this class.     Restrictions for eating before procedure:   · If you are getting procedural sedation, then do not eat to for 8 hours prior to procedure appointment time. Do not drink fluids for four hours prior to your procedure time.   · If you are not having procedural sedation, then Skip the meal prior to your procedure. If you have a morning procedure then skip breakfast. If you have an afternoon procedure then skip lunch.   · You  may drink clear liquids up to 2 hours prior to your procedure  · You must have a  the day of procedure to accompany you home.      POST PROCEDURE INSTRUCTIONS   · No heavy lifting, strenuous bending or strenuous exercise for 3 days after your procedure.  · No hot tubs, baths, swimming for 3 days after your procedure  · You can remove the bandage the day after the procedure.  · IF YOU RECEIVED A STEROID INJECTION. PLEASE NOTE THAT THERE MAY BE A DELAY FOR THE INJECTION TO START WORKING, THE DELAY MAY BE UP TO TWO WEEKS. IF YOU HAVE DIABETES, PLEASE NOTE THAT YOUR SUGAR LEVELS MAY BE ELEVATED FOR 1-2 DAYS AFTER A STEROID INJECTION.  THE STEROID MAY CAUSE TEMPORARY SYMPTOMS WHICH USUALLY RESOLVE ON THEIR OWN WITHIN 1 TO 2 DAYS INCLUDING FACIAL FLUSHING OR A FEELING OF WARMTH ON THE FACE, TEMPORARY INCREASES IN BLOOD SUGAR, INSOMNIA, INCREASED HUNGER  · IF YOU EXPERIENCE PROLONGED WEAKNESS LONGER THAN ONE DAY, BOWEL OR BLADDER INCONTINENCE THEN PLEASE CALL THE PHYSIATRY OFFICE.  · Your leg may feel heavy, weak and numb for up to 1-2 days. Be very careful walking.   ·  You may resume normal activities 3 days after procedure.

## 2021-12-13 ENCOUNTER — PRE-ADMISSION TESTING (OUTPATIENT)
Dept: ADMISSIONS | Facility: MEDICAL CENTER | Age: 46
DRG: 621 | End: 2021-12-13
Attending: COLON & RECTAL SURGERY
Payer: COMMERCIAL

## 2021-12-13 DIAGNOSIS — Z01.812 PRE-OPERATIVE LABORATORY EXAMINATION: ICD-10-CM

## 2021-12-13 LAB
ALBUMIN SERPL BCP-MCNC: 4.3 G/DL (ref 3.2–4.9)
ALBUMIN/GLOB SERPL: 1.4 G/DL
ALP SERPL-CCNC: 96 U/L (ref 30–99)
ALT SERPL-CCNC: 34 U/L (ref 2–50)
ANION GAP SERPL CALC-SCNC: 14 MMOL/L (ref 7–16)
AST SERPL-CCNC: 15 U/L (ref 12–45)
BILIRUB SERPL-MCNC: 0.4 MG/DL (ref 0.1–1.5)
BUN SERPL-MCNC: 15 MG/DL (ref 8–22)
CALCIUM SERPL-MCNC: 9.4 MG/DL (ref 8.5–10.5)
CHLORIDE SERPL-SCNC: 104 MMOL/L (ref 96–112)
CHOLEST SERPL-MCNC: 179 MG/DL (ref 100–199)
CO2 SERPL-SCNC: 21 MMOL/L (ref 20–33)
CREAT SERPL-MCNC: 0.39 MG/DL (ref 0.5–1.4)
ERYTHROCYTE [DISTWIDTH] IN BLOOD BY AUTOMATED COUNT: 43.1 FL (ref 35.9–50)
FERRITIN SERPL-MCNC: 118 NG/ML (ref 10–291)
FOLATE SERPL-MCNC: 28.8 NG/ML
GLOBULIN SER CALC-MCNC: 3 G/DL (ref 1.9–3.5)
GLUCOSE SERPL-MCNC: 87 MG/DL (ref 65–99)
HCG SERPL QL: NEGATIVE
HCT VFR BLD AUTO: 44.6 % (ref 37–47)
HDLC SERPL-MCNC: 41 MG/DL
HGB BLD-MCNC: 14.6 G/DL (ref 12–16)
INR PPP: 1.11 (ref 0.87–1.13)
IRON SATN MFR SERPL: 29 % (ref 15–55)
IRON SERPL-MCNC: 76 UG/DL (ref 40–170)
LDLC SERPL CALC-MCNC: 121 MG/DL
MCH RBC QN AUTO: 29.7 PG (ref 27–33)
MCHC RBC AUTO-ENTMCNC: 32.7 G/DL (ref 33.6–35)
MCV RBC AUTO: 90.7 FL (ref 81.4–97.8)
PLATELET # BLD AUTO: 209 K/UL (ref 164–446)
PMV BLD AUTO: 10.9 FL (ref 9–12.9)
POTASSIUM SERPL-SCNC: 4.4 MMOL/L (ref 3.6–5.5)
PREALB SERPL-MCNC: 20.1 MG/DL (ref 18–38)
PROT SERPL-MCNC: 7.3 G/DL (ref 6–8.2)
PROTHROMBIN TIME: 14 SEC (ref 12–14.6)
RBC # BLD AUTO: 4.92 M/UL (ref 4.2–5.4)
SARS-COV-2 RNA RESP QL NAA+PROBE: NOTDETECTED
SODIUM SERPL-SCNC: 139 MMOL/L (ref 135–145)
SPECIMEN SOURCE: NORMAL
TIBC SERPL-MCNC: 265 UG/DL (ref 250–450)
TRANSFERRIN SERPL-MCNC: 230 MG/DL (ref 200–370)
TRIGL SERPL-MCNC: 87 MG/DL (ref 0–149)
UIBC SERPL-MCNC: 189 UG/DL (ref 110–370)
VIT B12 SERPL-MCNC: 885 PG/ML (ref 211–911)
WBC # BLD AUTO: 6.5 K/UL (ref 4.8–10.8)

## 2021-12-13 PROCEDURE — U0003 INFECTIOUS AGENT DETECTION BY NUCLEIC ACID (DNA OR RNA); SEVERE ACUTE RESPIRATORY SYNDROME CORONAVIRUS 2 (SARS-COV-2) (CORONAVIRUS DISEASE [COVID-19]), AMPLIFIED PROBE TECHNIQUE, MAKING USE OF HIGH THROUGHPUT TECHNOLOGIES AS DESCRIBED BY CMS-2020-01-R: HCPCS

## 2021-12-13 PROCEDURE — 82746 ASSAY OF FOLIC ACID SERUM: CPT

## 2021-12-13 PROCEDURE — 84425 ASSAY OF VITAMIN B-1: CPT

## 2021-12-13 PROCEDURE — 36415 COLL VENOUS BLD VENIPUNCTURE: CPT

## 2021-12-13 PROCEDURE — 83550 IRON BINDING TEST: CPT

## 2021-12-13 PROCEDURE — 82728 ASSAY OF FERRITIN: CPT

## 2021-12-13 PROCEDURE — 84466 ASSAY OF TRANSFERRIN: CPT

## 2021-12-13 PROCEDURE — 84134 ASSAY OF PREALBUMIN: CPT

## 2021-12-13 PROCEDURE — 84207 ASSAY OF VITAMIN B-6: CPT

## 2021-12-13 PROCEDURE — C9803 HOPD COVID-19 SPEC COLLECT: HCPCS

## 2021-12-13 PROCEDURE — 85610 PROTHROMBIN TIME: CPT

## 2021-12-13 PROCEDURE — 82306 VITAMIN D 25 HYDROXY: CPT

## 2021-12-13 PROCEDURE — 80061 LIPID PANEL: CPT

## 2021-12-13 PROCEDURE — U0005 INFEC AGEN DETEC AMPLI PROBE: HCPCS

## 2021-12-13 PROCEDURE — 80053 COMPREHEN METABOLIC PANEL: CPT

## 2021-12-13 PROCEDURE — 84630 ASSAY OF ZINC: CPT

## 2021-12-13 PROCEDURE — 84703 CHORIONIC GONADOTROPIN ASSAY: CPT

## 2021-12-13 PROCEDURE — 82607 VITAMIN B-12: CPT

## 2021-12-13 PROCEDURE — 83540 ASSAY OF IRON: CPT

## 2021-12-13 PROCEDURE — 85027 COMPLETE CBC AUTOMATED: CPT

## 2021-12-13 RX ORDER — DOCUSATE SODIUM 100 MG/1
100 CAPSULE, LIQUID FILLED ORAL 2 TIMES DAILY
COMMUNITY

## 2021-12-15 ENCOUNTER — ANESTHESIA EVENT (OUTPATIENT)
Dept: SURGERY | Facility: MEDICAL CENTER | Age: 46
DRG: 621 | End: 2021-12-15
Payer: COMMERCIAL

## 2021-12-15 ENCOUNTER — HOSPITAL ENCOUNTER (INPATIENT)
Facility: MEDICAL CENTER | Age: 46
LOS: 2 days | DRG: 621 | End: 2021-12-17
Attending: COLON & RECTAL SURGERY | Admitting: COLON & RECTAL SURGERY
Payer: COMMERCIAL

## 2021-12-15 ENCOUNTER — ANESTHESIA (OUTPATIENT)
Dept: SURGERY | Facility: MEDICAL CENTER | Age: 46
DRG: 621 | End: 2021-12-15
Payer: COMMERCIAL

## 2021-12-15 LAB — ZINC SERPL-MCNC: 103.5 UG/DL (ref 60–120)

## 2021-12-15 PROCEDURE — 700111 HCHG RX REV CODE 636 W/ 250 OVERRIDE (IP): Performed by: STUDENT IN AN ORGANIZED HEALTH CARE EDUCATION/TRAINING PROGRAM

## 2021-12-15 PROCEDURE — 770001 HCHG ROOM/CARE - MED/SURG/GYN PRIV*

## 2021-12-15 PROCEDURE — 700102 HCHG RX REV CODE 250 W/ 637 OVERRIDE(OP): Performed by: PHYSICIAN ASSISTANT

## 2021-12-15 PROCEDURE — 501570 HCHG TROCAR, SEPARATOR: Performed by: COLON & RECTAL SURGERY

## 2021-12-15 PROCEDURE — 700105 HCHG RX REV CODE 258: Performed by: COLON & RECTAL SURGERY

## 2021-12-15 PROCEDURE — 0DB64Z3 EXCISION OF STOMACH, PERCUTANEOUS ENDOSCOPIC APPROACH, VERTICAL: ICD-10-PCS | Performed by: COLON & RECTAL SURGERY

## 2021-12-15 PROCEDURE — 700111 HCHG RX REV CODE 636 W/ 250 OVERRIDE (IP): Performed by: COLON & RECTAL SURGERY

## 2021-12-15 PROCEDURE — 160041 HCHG SURGERY MINUTES - EA ADDL 1 MIN LEVEL 4: Performed by: COLON & RECTAL SURGERY

## 2021-12-15 PROCEDURE — 700111 HCHG RX REV CODE 636 W/ 250 OVERRIDE (IP): Performed by: PHYSICIAN ASSISTANT

## 2021-12-15 PROCEDURE — 160035 HCHG PACU - 1ST 60 MINS PHASE I: Performed by: COLON & RECTAL SURGERY

## 2021-12-15 PROCEDURE — 160036 HCHG PACU - EA ADDL 30 MINS PHASE I: Performed by: COLON & RECTAL SURGERY

## 2021-12-15 PROCEDURE — 502570 HCHG PACK, GASTRIC BANDING: Performed by: COLON & RECTAL SURGERY

## 2021-12-15 PROCEDURE — 501399 HCHG SPECIMAN BAG, ENDO CATC: Performed by: COLON & RECTAL SURGERY

## 2021-12-15 PROCEDURE — 700101 HCHG RX REV CODE 250: Performed by: PHYSICIAN ASSISTANT

## 2021-12-15 PROCEDURE — A9270 NON-COVERED ITEM OR SERVICE: HCPCS | Performed by: PHYSICIAN ASSISTANT

## 2021-12-15 PROCEDURE — 501838 HCHG SUTURE GENERAL: Performed by: COLON & RECTAL SURGERY

## 2021-12-15 PROCEDURE — 501338 HCHG SHEARS, ENDO: Performed by: COLON & RECTAL SURGERY

## 2021-12-15 PROCEDURE — 501497 HCHG SURGICLIP: Performed by: COLON & RECTAL SURGERY

## 2021-12-15 PROCEDURE — 700101 HCHG RX REV CODE 250: Performed by: STUDENT IN AN ORGANIZED HEALTH CARE EDUCATION/TRAINING PROGRAM

## 2021-12-15 PROCEDURE — 160009 HCHG ANES TIME/MIN: Performed by: COLON & RECTAL SURGERY

## 2021-12-15 PROCEDURE — 700102 HCHG RX REV CODE 250 W/ 637 OVERRIDE(OP): Performed by: COLON & RECTAL SURGERY

## 2021-12-15 PROCEDURE — 88312 SPECIAL STAINS GROUP 1: CPT

## 2021-12-15 PROCEDURE — 160048 HCHG OR STATISTICAL LEVEL 1-5: Performed by: COLON & RECTAL SURGERY

## 2021-12-15 PROCEDURE — 700101 HCHG RX REV CODE 250: Performed by: COLON & RECTAL SURGERY

## 2021-12-15 PROCEDURE — 700111 HCHG RX REV CODE 636 W/ 250 OVERRIDE (IP)

## 2021-12-15 PROCEDURE — 160002 HCHG RECOVERY MINUTES (STAT): Performed by: COLON & RECTAL SURGERY

## 2021-12-15 PROCEDURE — 88307 TISSUE EXAM BY PATHOLOGIST: CPT

## 2021-12-15 PROCEDURE — A9270 NON-COVERED ITEM OR SERVICE: HCPCS | Performed by: COLON & RECTAL SURGERY

## 2021-12-15 PROCEDURE — 160029 HCHG SURGERY MINUTES - 1ST 30 MINS LEVEL 4: Performed by: COLON & RECTAL SURGERY

## 2021-12-15 PROCEDURE — 501583 HCHG TROCAR, THRD CAN&SEAL 5X100: Performed by: COLON & RECTAL SURGERY

## 2021-12-15 RX ORDER — MEPERIDINE HYDROCHLORIDE 25 MG/ML
12.5 INJECTION INTRAMUSCULAR; INTRAVENOUS; SUBCUTANEOUS
Status: DISCONTINUED | OUTPATIENT
Start: 2021-12-15 | End: 2021-12-15 | Stop reason: HOSPADM

## 2021-12-15 RX ORDER — METOCLOPRAMIDE HYDROCHLORIDE 5 MG/ML
INJECTION INTRAMUSCULAR; INTRAVENOUS PRN
Status: DISCONTINUED | OUTPATIENT
Start: 2021-12-15 | End: 2021-12-15 | Stop reason: SURG

## 2021-12-15 RX ORDER — MIDAZOLAM HYDROCHLORIDE 1 MG/ML
1 INJECTION INTRAMUSCULAR; INTRAVENOUS
Status: DISCONTINUED | OUTPATIENT
Start: 2021-12-15 | End: 2021-12-15 | Stop reason: HOSPADM

## 2021-12-15 RX ORDER — SODIUM CHLORIDE, SODIUM LACTATE, POTASSIUM CHLORIDE, AND CALCIUM CHLORIDE .6; .31; .03; .02 G/100ML; G/100ML; G/100ML; G/100ML
500 INJECTION, SOLUTION INTRAVENOUS
Status: DISCONTINUED | OUTPATIENT
Start: 2021-12-15 | End: 2021-12-17 | Stop reason: HOSPADM

## 2021-12-15 RX ORDER — ACETAMINOPHEN 500 MG
1000 TABLET ORAL EVERY 6 HOURS
Status: DISCONTINUED | OUTPATIENT
Start: 2021-12-16 | End: 2021-12-17 | Stop reason: HOSPADM

## 2021-12-15 RX ORDER — DIPHENHYDRAMINE HYDROCHLORIDE 50 MG/ML
12.5 INJECTION INTRAMUSCULAR; INTRAVENOUS
Status: DISCONTINUED | OUTPATIENT
Start: 2021-12-15 | End: 2021-12-15 | Stop reason: HOSPADM

## 2021-12-15 RX ORDER — OXYCODONE HCL 5 MG/5 ML
5 SOLUTION, ORAL ORAL
Status: DISCONTINUED | OUTPATIENT
Start: 2021-12-15 | End: 2021-12-16

## 2021-12-15 RX ORDER — DIPHENHYDRAMINE HYDROCHLORIDE 50 MG/ML
12.5 INJECTION INTRAMUSCULAR; INTRAVENOUS EVERY 6 HOURS PRN
Status: DISCONTINUED | OUTPATIENT
Start: 2021-12-15 | End: 2021-12-17 | Stop reason: HOSPADM

## 2021-12-15 RX ORDER — HALOPERIDOL 5 MG/ML
1 INJECTION INTRAMUSCULAR EVERY 6 HOURS PRN
Status: DISCONTINUED | OUTPATIENT
Start: 2021-12-15 | End: 2021-12-17 | Stop reason: HOSPADM

## 2021-12-15 RX ORDER — LABETALOL HYDROCHLORIDE 5 MG/ML
5 INJECTION, SOLUTION INTRAVENOUS
Status: COMPLETED | OUTPATIENT
Start: 2021-12-15 | End: 2021-12-15

## 2021-12-15 RX ORDER — ALBUTEROL SULFATE 90 UG/1
2 AEROSOL, METERED RESPIRATORY (INHALATION) EVERY 6 HOURS PRN
Status: DISCONTINUED | OUTPATIENT
Start: 2021-12-15 | End: 2021-12-17 | Stop reason: HOSPADM

## 2021-12-15 RX ORDER — SODIUM CHLORIDE AND POTASSIUM CHLORIDE 150; 900 MG/100ML; MG/100ML
INJECTION, SOLUTION INTRAVENOUS CONTINUOUS
Status: DISCONTINUED | OUTPATIENT
Start: 2021-12-15 | End: 2021-12-17 | Stop reason: HOSPADM

## 2021-12-15 RX ORDER — DIPHENHYDRAMINE HYDROCHLORIDE 50 MG/ML
25 INJECTION INTRAMUSCULAR; INTRAVENOUS EVERY 6 HOURS PRN
Status: DISCONTINUED | OUTPATIENT
Start: 2021-12-15 | End: 2021-12-17 | Stop reason: HOSPADM

## 2021-12-15 RX ORDER — HYDROMORPHONE HYDROCHLORIDE 1 MG/ML
0.5 INJECTION, SOLUTION INTRAMUSCULAR; INTRAVENOUS; SUBCUTANEOUS
Status: DISCONTINUED | OUTPATIENT
Start: 2021-12-15 | End: 2021-12-17 | Stop reason: HOSPADM

## 2021-12-15 RX ORDER — HALOPERIDOL 5 MG/ML
INJECTION INTRAMUSCULAR
Status: COMPLETED
Start: 2021-12-15 | End: 2021-12-15

## 2021-12-15 RX ORDER — BUPIVACAINE HYDROCHLORIDE AND EPINEPHRINE 5; 5 MG/ML; UG/ML
INJECTION, SOLUTION PERINEURAL
Status: DISCONTINUED | OUTPATIENT
Start: 2021-12-15 | End: 2021-12-15 | Stop reason: HOSPADM

## 2021-12-15 RX ORDER — ACETAMINOPHEN 500 MG
1000 TABLET ORAL EVERY 6 HOURS PRN
Status: DISCONTINUED | OUTPATIENT
Start: 2021-12-20 | End: 2021-12-17 | Stop reason: HOSPADM

## 2021-12-15 RX ORDER — CALCIUM CARBONATE 500 MG/1
500 TABLET, CHEWABLE ORAL
Status: DISCONTINUED | OUTPATIENT
Start: 2021-12-15 | End: 2021-12-17 | Stop reason: HOSPADM

## 2021-12-15 RX ORDER — HYDROMORPHONE HYDROCHLORIDE 2 MG/ML
INJECTION, SOLUTION INTRAMUSCULAR; INTRAVENOUS; SUBCUTANEOUS PRN
Status: DISCONTINUED | OUTPATIENT
Start: 2021-12-15 | End: 2021-12-15 | Stop reason: SURG

## 2021-12-15 RX ORDER — PROMETHAZINE HYDROCHLORIDE 25 MG/1
25 TABLET ORAL EVERY 6 HOURS PRN
Status: DISCONTINUED | OUTPATIENT
Start: 2021-12-15 | End: 2021-12-15 | Stop reason: HOSPADM

## 2021-12-15 RX ORDER — LOSARTAN POTASSIUM 50 MG/1
50 TABLET ORAL DAILY
Status: DISCONTINUED | OUTPATIENT
Start: 2021-12-16 | End: 2021-12-17 | Stop reason: HOSPADM

## 2021-12-15 RX ORDER — ESMOLOL HYDROCHLORIDE 10 MG/ML
INJECTION INTRAVENOUS PRN
Status: DISCONTINUED | OUTPATIENT
Start: 2021-12-15 | End: 2021-12-15 | Stop reason: SURG

## 2021-12-15 RX ORDER — OXYCODONE HCL 5 MG/5 ML
5 SOLUTION, ORAL ORAL
Status: DISCONTINUED | OUTPATIENT
Start: 2021-12-15 | End: 2021-12-15 | Stop reason: HOSPADM

## 2021-12-15 RX ORDER — GABAPENTIN 300 MG/1
300 CAPSULE ORAL ONCE
Status: DISCONTINUED | OUTPATIENT
Start: 2021-12-15 | End: 2021-12-15 | Stop reason: HOSPADM

## 2021-12-15 RX ORDER — PROMETHAZINE HYDROCHLORIDE 25 MG/1
25 SUPPOSITORY RECTAL EVERY 4 HOURS PRN
Status: DISCONTINUED | OUTPATIENT
Start: 2021-12-15 | End: 2021-12-17 | Stop reason: HOSPADM

## 2021-12-15 RX ORDER — LIDOCAINE HYDROCHLORIDE 20 MG/ML
INJECTION, SOLUTION EPIDURAL; INFILTRATION; INTRACAUDAL; PERINEURAL PRN
Status: DISCONTINUED | OUTPATIENT
Start: 2021-12-15 | End: 2021-12-15 | Stop reason: SURG

## 2021-12-15 RX ORDER — CEFAZOLIN SODIUM 1 G/3ML
INJECTION, POWDER, FOR SOLUTION INTRAMUSCULAR; INTRAVENOUS PRN
Status: DISCONTINUED | OUTPATIENT
Start: 2021-12-15 | End: 2021-12-15 | Stop reason: SURG

## 2021-12-15 RX ORDER — ACETAMINOPHEN 10 MG/ML
1 INJECTION, SOLUTION INTRAVENOUS ONCE
Status: COMPLETED | OUTPATIENT
Start: 2021-12-15 | End: 2021-12-15

## 2021-12-15 RX ORDER — ONDANSETRON 2 MG/ML
4 INJECTION INTRAMUSCULAR; INTRAVENOUS
Status: DISCONTINUED | OUTPATIENT
Start: 2021-12-15 | End: 2021-12-15 | Stop reason: HOSPADM

## 2021-12-15 RX ORDER — OXYCODONE HCL 5 MG/5 ML
10 SOLUTION, ORAL ORAL
Status: DISCONTINUED | OUTPATIENT
Start: 2021-12-15 | End: 2021-12-15 | Stop reason: HOSPADM

## 2021-12-15 RX ORDER — OXYCODONE HCL 5 MG/5 ML
10 SOLUTION, ORAL ORAL
Status: DISCONTINUED | OUTPATIENT
Start: 2021-12-15 | End: 2021-12-16

## 2021-12-15 RX ORDER — SIMETHICONE 125 MG
125 TABLET,CHEWABLE ORAL 3 TIMES DAILY PRN
Status: DISCONTINUED | OUTPATIENT
Start: 2021-12-15 | End: 2021-12-17 | Stop reason: HOSPADM

## 2021-12-15 RX ORDER — LORAZEPAM 2 MG/ML
0.5 INJECTION INTRAMUSCULAR EVERY 4 HOURS PRN
Status: DISCONTINUED | OUTPATIENT
Start: 2021-12-15 | End: 2021-12-17 | Stop reason: HOSPADM

## 2021-12-15 RX ORDER — ACETAMINOPHEN 10 MG/ML
1000 INJECTION, SOLUTION INTRAVENOUS EVERY 6 HOURS
Status: COMPLETED | OUTPATIENT
Start: 2021-12-15 | End: 2021-12-16

## 2021-12-15 RX ORDER — HYDROMORPHONE HYDROCHLORIDE 1 MG/ML
0.4 INJECTION, SOLUTION INTRAMUSCULAR; INTRAVENOUS; SUBCUTANEOUS
Status: DISCONTINUED | OUTPATIENT
Start: 2021-12-15 | End: 2021-12-15 | Stop reason: HOSPADM

## 2021-12-15 RX ORDER — DIPHENHYDRAMINE HCL 25 MG
25 TABLET ORAL EVERY 6 HOURS PRN
Status: DISCONTINUED | OUTPATIENT
Start: 2021-12-15 | End: 2021-12-17 | Stop reason: HOSPADM

## 2021-12-15 RX ORDER — ONDANSETRON 2 MG/ML
4 INJECTION INTRAMUSCULAR; INTRAVENOUS EVERY 4 HOURS PRN
Status: DISCONTINUED | OUTPATIENT
Start: 2021-12-15 | End: 2021-12-17 | Stop reason: HOSPADM

## 2021-12-15 RX ORDER — METOPROLOL TARTRATE 1 MG/ML
1 INJECTION, SOLUTION INTRAVENOUS
Status: DISCONTINUED | OUTPATIENT
Start: 2021-12-15 | End: 2021-12-15 | Stop reason: HOSPADM

## 2021-12-15 RX ORDER — ENALAPRILAT 1.25 MG/ML
2.5 INJECTION INTRAVENOUS EVERY 6 HOURS PRN
Status: DISCONTINUED | OUTPATIENT
Start: 2021-12-15 | End: 2021-12-17 | Stop reason: HOSPADM

## 2021-12-15 RX ORDER — SCOLOPAMINE TRANSDERMAL SYSTEM 1 MG/1
1 PATCH, EXTENDED RELEASE TRANSDERMAL ONCE
Status: DISCONTINUED | OUTPATIENT
Start: 2021-12-15 | End: 2021-12-15 | Stop reason: HOSPADM

## 2021-12-15 RX ORDER — SODIUM CHLORIDE, SODIUM LACTATE, POTASSIUM CHLORIDE, CALCIUM CHLORIDE 600; 310; 30; 20 MG/100ML; MG/100ML; MG/100ML; MG/100ML
INJECTION, SOLUTION INTRAVENOUS CONTINUOUS
Status: DISCONTINUED | OUTPATIENT
Start: 2021-12-15 | End: 2021-12-15

## 2021-12-15 RX ORDER — HYDRALAZINE HYDROCHLORIDE 20 MG/ML
5 INJECTION INTRAMUSCULAR; INTRAVENOUS
Status: DISCONTINUED | OUTPATIENT
Start: 2021-12-15 | End: 2021-12-15 | Stop reason: HOSPADM

## 2021-12-15 RX ORDER — HALOPERIDOL 5 MG/ML
1 INJECTION INTRAMUSCULAR
Status: COMPLETED | OUTPATIENT
Start: 2021-12-15 | End: 2021-12-15

## 2021-12-15 RX ORDER — DEXAMETHASONE SODIUM PHOSPHATE 4 MG/ML
INJECTION, SOLUTION INTRA-ARTICULAR; INTRALESIONAL; INTRAMUSCULAR; INTRAVENOUS; SOFT TISSUE PRN
Status: DISCONTINUED | OUTPATIENT
Start: 2021-12-15 | End: 2021-12-15 | Stop reason: SURG

## 2021-12-15 RX ORDER — OXYCODONE HCL 10 MG/1
10 TABLET, FILM COATED, EXTENDED RELEASE ORAL ONCE
Status: COMPLETED | OUTPATIENT
Start: 2021-12-15 | End: 2021-12-15

## 2021-12-15 RX ORDER — HYDROMORPHONE HYDROCHLORIDE 1 MG/ML
0.2 INJECTION, SOLUTION INTRAMUSCULAR; INTRAVENOUS; SUBCUTANEOUS
Status: DISCONTINUED | OUTPATIENT
Start: 2021-12-15 | End: 2021-12-15 | Stop reason: HOSPADM

## 2021-12-15 RX ORDER — HYDROMORPHONE HYDROCHLORIDE 1 MG/ML
0.1 INJECTION, SOLUTION INTRAMUSCULAR; INTRAVENOUS; SUBCUTANEOUS
Status: DISCONTINUED | OUTPATIENT
Start: 2021-12-15 | End: 2021-12-15 | Stop reason: HOSPADM

## 2021-12-15 RX ADMIN — LIDOCAINE HYDROCHLORIDE 0.5 ML: 10 INJECTION, SOLUTION EPIDURAL; INFILTRATION; INTRACAUDAL; PERINEURAL at 14:44

## 2021-12-15 RX ADMIN — SODIUM CHLORIDE, POTASSIUM CHLORIDE, SODIUM LACTATE AND CALCIUM CHLORIDE: 600; 310; 30; 20 INJECTION, SOLUTION INTRAVENOUS at 14:45

## 2021-12-15 RX ADMIN — HYDROMORPHONE HYDROCHLORIDE 0.6 MCG: 2 INJECTION, SOLUTION INTRAMUSCULAR; INTRAVENOUS; SUBCUTANEOUS at 16:44

## 2021-12-15 RX ADMIN — FENTANYL CITRATE 50 MCG: 50 INJECTION, SOLUTION INTRAMUSCULAR; INTRAVENOUS at 17:18

## 2021-12-15 RX ADMIN — LIDOCAINE HYDROCHLORIDE 100 MG: 20 INJECTION, SOLUTION EPIDURAL; INFILTRATION; INTRACAUDAL at 16:24

## 2021-12-15 RX ADMIN — LABETALOL HYDROCHLORIDE 5 MG: 5 INJECTION, SOLUTION INTRAVENOUS at 17:39

## 2021-12-15 RX ADMIN — PROMETHAZINE HYDROCHLORIDE 25 MG: 25 SUPPOSITORY RECTAL at 18:05

## 2021-12-15 RX ADMIN — METOCLOPRAMIDE 10 MG: 5 INJECTION, SOLUTION INTRAMUSCULAR; INTRAVENOUS at 16:30

## 2021-12-15 RX ADMIN — FENTANYL CITRATE 50 MCG: 50 INJECTION, SOLUTION INTRAMUSCULAR; INTRAVENOUS at 17:41

## 2021-12-15 RX ADMIN — HYDRALAZINE HYDROCHLORIDE 5 MG: 20 INJECTION INTRAMUSCULAR; INTRAVENOUS at 18:55

## 2021-12-15 RX ADMIN — HYDRALAZINE HYDROCHLORIDE 5 MG: 20 INJECTION INTRAMUSCULAR; INTRAVENOUS at 18:10

## 2021-12-15 RX ADMIN — CEFAZOLIN 3 G: 330 INJECTION, POWDER, FOR SOLUTION INTRAMUSCULAR; INTRAVENOUS at 16:25

## 2021-12-15 RX ADMIN — DIPHENHYDRAMINE HYDROCHLORIDE 12.5 MG: 50 INJECTION INTRAMUSCULAR; INTRAVENOUS at 20:40

## 2021-12-15 RX ADMIN — ACETAMINOPHEN 1 G: 10 INJECTION, SOLUTION INTRAVENOUS at 14:43

## 2021-12-15 RX ADMIN — HALOPERIDOL LACTATE 1 MG: 5 INJECTION, SOLUTION INTRAMUSCULAR at 17:16

## 2021-12-15 RX ADMIN — POTASSIUM CHLORIDE AND SODIUM CHLORIDE: 900; 150 INJECTION, SOLUTION INTRAVENOUS at 20:41

## 2021-12-15 RX ADMIN — ESMOLOL HYDROCHLORIDE 40 MG: 100 INJECTION, SOLUTION INTRAVENOUS at 16:50

## 2021-12-15 RX ADMIN — OXYCODONE HYDROCHLORIDE 10 MG: 10 TABLET, FILM COATED, EXTENDED RELEASE ORAL at 14:46

## 2021-12-15 RX ADMIN — HYDROMORPHONE HYDROCHLORIDE 0.2 MG: 1 INJECTION, SOLUTION INTRAMUSCULAR; INTRAVENOUS; SUBCUTANEOUS at 19:01

## 2021-12-15 RX ADMIN — FAMOTIDINE 20 MG: 10 INJECTION INTRAVENOUS at 20:41

## 2021-12-15 RX ADMIN — HYDRALAZINE HYDROCHLORIDE 5 MG: 20 INJECTION INTRAMUSCULAR; INTRAVENOUS at 18:30

## 2021-12-15 RX ADMIN — ACETAMINOPHEN 1000 MG: 10 INJECTION, SOLUTION INTRAVENOUS at 20:41

## 2021-12-15 RX ADMIN — LABETALOL HYDROCHLORIDE 5 MG: 5 INJECTION, SOLUTION INTRAVENOUS at 18:45

## 2021-12-15 RX ADMIN — DEXAMETHASONE SODIUM PHOSPHATE 8 MG: 4 INJECTION, SOLUTION INTRA-ARTICULAR; INTRALESIONAL; INTRAMUSCULAR; INTRAVENOUS; SOFT TISSUE at 16:30

## 2021-12-15 RX ADMIN — HYDROMORPHONE HYDROCHLORIDE 1 MCG: 2 INJECTION, SOLUTION INTRAMUSCULAR; INTRAVENOUS; SUBCUTANEOUS at 16:28

## 2021-12-15 RX ADMIN — HYDROMORPHONE HYDROCHLORIDE 0.2 MG: 1 INJECTION, SOLUTION INTRAMUSCULAR; INTRAVENOUS; SUBCUTANEOUS at 19:32

## 2021-12-15 RX ADMIN — HALOPERIDOL LACTATE 1 MG: 5 INJECTION, SOLUTION INTRAMUSCULAR at 20:40

## 2021-12-15 RX ADMIN — HALOPERIDOL LACTATE 1 MG: 5 INJECTION, SOLUTION INTRAMUSCULAR at 17:35

## 2021-12-15 ASSESSMENT — PAIN DESCRIPTION - PAIN TYPE
TYPE: SURGICAL PAIN

## 2021-12-15 ASSESSMENT — FIBROSIS 4 INDEX: FIB4 SCORE: 0.58

## 2021-12-16 LAB
ALBUMIN SERPL BCP-MCNC: 4.3 G/DL (ref 3.2–4.9)
ALBUMIN/GLOB SERPL: 1.5 G/DL
ALP SERPL-CCNC: 92 U/L (ref 30–99)
ALT SERPL-CCNC: 36 U/L (ref 2–50)
ANION GAP SERPL CALC-SCNC: 13 MMOL/L (ref 7–16)
AST SERPL-CCNC: 24 U/L (ref 12–45)
BILIRUB SERPL-MCNC: 0.3 MG/DL (ref 0.1–1.5)
BUN SERPL-MCNC: 16 MG/DL (ref 8–22)
CALCIUM SERPL-MCNC: 8.8 MG/DL (ref 8.5–10.5)
CHLORIDE SERPL-SCNC: 106 MMOL/L (ref 96–112)
CO2 SERPL-SCNC: 21 MMOL/L (ref 20–33)
CREAT SERPL-MCNC: 0.43 MG/DL (ref 0.5–1.4)
ERYTHROCYTE [DISTWIDTH] IN BLOOD BY AUTOMATED COUNT: 43 FL (ref 35.9–50)
GLOBULIN SER CALC-MCNC: 2.8 G/DL (ref 1.9–3.5)
GLUCOSE SERPL-MCNC: 198 MG/DL (ref 65–99)
HCT VFR BLD AUTO: 43.5 % (ref 37–47)
HGB BLD-MCNC: 14.3 G/DL (ref 12–16)
MCH RBC QN AUTO: 30 PG (ref 27–33)
MCHC RBC AUTO-ENTMCNC: 32.9 G/DL (ref 33.6–35)
MCV RBC AUTO: 91.2 FL (ref 81.4–97.8)
PATHOLOGY CONSULT NOTE: NORMAL
PLATELET # BLD AUTO: 201 K/UL (ref 164–446)
PMV BLD AUTO: 10.9 FL (ref 9–12.9)
POTASSIUM SERPL-SCNC: 4.3 MMOL/L (ref 3.6–5.5)
PROT SERPL-MCNC: 7.1 G/DL (ref 6–8.2)
RBC # BLD AUTO: 4.77 M/UL (ref 4.2–5.4)
SODIUM SERPL-SCNC: 140 MMOL/L (ref 135–145)
VIT B6 SERPL-MCNC: 42 NMOL/L (ref 20–125)
WBC # BLD AUTO: 9.7 K/UL (ref 4.8–10.8)

## 2021-12-16 PROCEDURE — 85027 COMPLETE CBC AUTOMATED: CPT

## 2021-12-16 PROCEDURE — A9270 NON-COVERED ITEM OR SERVICE: HCPCS | Performed by: PHYSICIAN ASSISTANT

## 2021-12-16 PROCEDURE — 700111 HCHG RX REV CODE 636 W/ 250 OVERRIDE (IP): Performed by: PHYSICIAN ASSISTANT

## 2021-12-16 PROCEDURE — 700101 HCHG RX REV CODE 250: Performed by: PHYSICIAN ASSISTANT

## 2021-12-16 PROCEDURE — 770001 HCHG ROOM/CARE - MED/SURG/GYN PRIV*

## 2021-12-16 PROCEDURE — A9270 NON-COVERED ITEM OR SERVICE: HCPCS | Performed by: STUDENT IN AN ORGANIZED HEALTH CARE EDUCATION/TRAINING PROGRAM

## 2021-12-16 PROCEDURE — 700102 HCHG RX REV CODE 250 W/ 637 OVERRIDE(OP): Performed by: STUDENT IN AN ORGANIZED HEALTH CARE EDUCATION/TRAINING PROGRAM

## 2021-12-16 PROCEDURE — 80053 COMPREHEN METABOLIC PANEL: CPT

## 2021-12-16 PROCEDURE — 700102 HCHG RX REV CODE 250 W/ 637 OVERRIDE(OP): Performed by: PHYSICIAN ASSISTANT

## 2021-12-16 RX ORDER — OXYCODONE HYDROCHLORIDE 5 MG/1
5 TABLET ORAL EVERY 4 HOURS PRN
Status: DISCONTINUED | OUTPATIENT
Start: 2021-12-16 | End: 2021-12-17 | Stop reason: HOSPADM

## 2021-12-16 RX ORDER — PROMETHAZINE HYDROCHLORIDE 25 MG/1
25 TABLET ORAL EVERY 4 HOURS PRN
Status: DISCONTINUED | OUTPATIENT
Start: 2021-12-16 | End: 2021-12-17 | Stop reason: HOSPADM

## 2021-12-16 RX ORDER — OXYCODONE HYDROCHLORIDE 5 MG/1
2.5 TABLET ORAL EVERY 4 HOURS PRN
Status: DISCONTINUED | OUTPATIENT
Start: 2021-12-16 | End: 2021-12-17 | Stop reason: HOSPADM

## 2021-12-16 RX ADMIN — FAMOTIDINE 20 MG: 10 INJECTION INTRAVENOUS at 18:23

## 2021-12-16 RX ADMIN — POTASSIUM CHLORIDE AND SODIUM CHLORIDE: 900; 150 INJECTION, SOLUTION INTRAVENOUS at 06:07

## 2021-12-16 RX ADMIN — OXYCODONE 2.5 MG: 5 TABLET ORAL at 22:27

## 2021-12-16 RX ADMIN — PROMETHAZINE HYDROCHLORIDE 25 MG: 25 TABLET ORAL at 22:26

## 2021-12-16 RX ADMIN — HALOPERIDOL LACTATE 1 MG: 5 INJECTION, SOLUTION INTRAMUSCULAR at 02:45

## 2021-12-16 RX ADMIN — POTASSIUM CHLORIDE AND SODIUM CHLORIDE: 900; 150 INJECTION, SOLUTION INTRAVENOUS at 13:49

## 2021-12-16 RX ADMIN — LOSARTAN POTASSIUM 50 MG: 50 TABLET, FILM COATED ORAL at 18:20

## 2021-12-16 RX ADMIN — PROMETHAZINE HYDROCHLORIDE 25 MG: 25 SUPPOSITORY RECTAL at 06:07

## 2021-12-16 RX ADMIN — DIPHENHYDRAMINE HYDROCHLORIDE 12.5 MG: 50 INJECTION INTRAMUSCULAR; INTRAVENOUS at 02:45

## 2021-12-16 RX ADMIN — ENOXAPARIN SODIUM 40 MG: 40 INJECTION SUBCUTANEOUS at 18:29

## 2021-12-16 RX ADMIN — OXYCODONE 2.5 MG: 5 TABLET ORAL at 13:47

## 2021-12-16 RX ADMIN — PROMETHAZINE HYDROCHLORIDE 25 MG: 25 TABLET ORAL at 18:20

## 2021-12-16 RX ADMIN — OXYCODONE 2.5 MG: 5 TABLET ORAL at 18:20

## 2021-12-16 RX ADMIN — ACETAMINOPHEN 1000 MG: 10 INJECTION, SOLUTION INTRAVENOUS at 02:45

## 2021-12-16 RX ADMIN — FAMOTIDINE 20 MG: 10 INJECTION INTRAVENOUS at 06:07

## 2021-12-16 RX ADMIN — ENOXAPARIN SODIUM 40 MG: 40 INJECTION SUBCUTANEOUS at 08:10

## 2021-12-16 RX ADMIN — PROMETHAZINE HYDROCHLORIDE 25 MG: 25 TABLET ORAL at 13:47

## 2021-12-16 RX ADMIN — SIMETHICONE 125 MG: 125 TABLET, CHEWABLE ORAL at 06:07

## 2021-12-16 RX ADMIN — ACETAMINOPHEN 1000 MG: 500 TABLET ORAL at 08:09

## 2021-12-16 ASSESSMENT — COPD QUESTIONNAIRES
COPD SCREENING SCORE: 2
DO YOU EVER COUGH UP ANY MUCUS OR PHLEGM?: NO/ONLY WITH OCCASIONAL COLDS OR INFECTIONS
DURING THE PAST 4 WEEKS HOW MUCH DID YOU FEEL SHORT OF BREATH: NONE/LITTLE OF THE TIME
HAVE YOU SMOKED AT LEAST 100 CIGARETTES IN YOUR ENTIRE LIFE: YES

## 2021-12-16 ASSESSMENT — ENCOUNTER SYMPTOMS
PALPITATIONS: 0
SHORTNESS OF BREATH: 0
DIARRHEA: 0
CHILLS: 0
COUGH: 0
ABDOMINAL PAIN: 1
FEVER: 0
VOMITING: 0
HEARTBURN: 0
NAUSEA: 0

## 2021-12-16 ASSESSMENT — COGNITIVE AND FUNCTIONAL STATUS - GENERAL
SUGGESTED CMS G CODE MODIFIER DAILY ACTIVITY: CK
STANDING UP FROM CHAIR USING ARMS: A LITTLE
DAILY ACTIVITIY SCORE: 18
TURNING FROM BACK TO SIDE WHILE IN FLAT BAD: A LITTLE
MOBILITY SCORE: 18
DRESSING REGULAR UPPER BODY CLOTHING: A LITTLE
DRESSING REGULAR LOWER BODY CLOTHING: A LITTLE
CLIMB 3 TO 5 STEPS WITH RAILING: A LITTLE
MOVING TO AND FROM BED TO CHAIR: A LITTLE
TOILETING: A LITTLE
HELP NEEDED FOR BATHING: A LITTLE
WALKING IN HOSPITAL ROOM: A LITTLE
MOVING FROM LYING ON BACK TO SITTING ON SIDE OF FLAT BED: A LITTLE
SUGGESTED CMS G CODE MODIFIER MOBILITY: CK
EATING MEALS: A LITTLE
PERSONAL GROOMING: A LITTLE

## 2021-12-16 ASSESSMENT — PATIENT HEALTH QUESTIONNAIRE - PHQ9
1. LITTLE INTEREST OR PLEASURE IN DOING THINGS: NOT AT ALL
2. FEELING DOWN, DEPRESSED, IRRITABLE, OR HOPELESS: NOT AT ALL
SUM OF ALL RESPONSES TO PHQ9 QUESTIONS 1 AND 2: 0

## 2021-12-16 ASSESSMENT — PAIN SCALES - GENERAL: PAIN_LEVEL: 2

## 2021-12-16 ASSESSMENT — PAIN DESCRIPTION - PAIN TYPE: TYPE: SURGICAL PAIN

## 2021-12-16 NOTE — PROGRESS NOTES
Bedside report received.  Assessment complete.  A&O x 4. Patient calls appropriately.  Patient transferred over from [facility].  Patient reports minimal pain. Reports N/V, small amount of dark red emesis. Medicated for N/V per MAR.  + void,  PTA BM.  Patient denies SOB.  SCD's on.  Review plan with of care with patient. Call light and personal belongings with in reach. Hourly rounding in place. All needs met at this time.

## 2021-12-16 NOTE — OR NURSING
1714 Pt arrived to PACU with Anesthesiologist and OR RN. AAOx4. Even, unlabored respirations. C/o abdmonial pain see MAR. C/o nausea (has documented hx of PONV), see MAR. Pt is hypertensive, see MAR.  Abdominal dressings x4 CDI, abdominal dressing left upper abdomen has slight ooze, dressing reinforced by 2x2 gauze and tape.     1805 Pt continues to c/o nausea, order received from Anesthesiologist for Phenergan suppository. Administered, see MAR.      1815 POC update given to Morro, spouse, over the phone. All questions answered. Pt states improvement of nausea, still mild.     1910 /77. VSS. Pt meets floor criteria.     1950 Report called to PRABHA Josue on [facility].     2008 Pt transported to [facility] with transport. Chart with patient.

## 2021-12-16 NOTE — ANESTHESIA PREPROCEDURE EVALUATION
Case: [id number] Date/Time: 12/15/21 1645    Procedure: GASTRECTOMY, SLEEVE, LAPAROSCOPIC    Pre-op diagnosis: MORBID OBESITY    Location: [place] [address] / SURGERY [facility]    Surgeons: Edu Luis M.D.          Relevant Problems   Other   (positive) Arthritis       Physical Exam    Airway   Mallampati: II  TM distance: >3 FB  Neck ROM: full       Cardiovascular - normal exam  Rhythm: regular  Rate: normal  (-) murmur     Dental - normal exam           Pulmonary - normal exam  Breath sounds clear to auscultation     Abdominal   (+) obese     Neurological - normal exam                 Anesthesia Plan    ASA 3   ASA physical status 3 criteria: morbid obesity - BMI greater than or equal to 40    Plan - general       Airway plan will be ETT          Induction: intravenous    Postoperative Plan: Postoperative administration of opioids is intended.    Pertinent diagnostic labs and testing reviewed    Informed Consent:    Anesthetic plan and risks discussed with patient.    Use of blood products discussed with: patient whom consented to blood products.

## 2021-12-16 NOTE — CARE PLAN
The patient is Stable - Low risk of patient condition declining or worsening    Shift Goals  Clinical Goals: Pain and N/V    Progress made toward(s) clinical / shift goals:      Problem: Pain - Standard  Goal: Alleviation of pain or a reduction in pain to the patient’s comfort goal  Outcome: Progressing     Problem: Knowledge Deficit - Standard  Goal: Patient and family/care givers will demonstrate understanding of plan of care, disease process/condition, diagnostic tests and medications  Outcome: Progressing       Patient is not progressing towards the following goals:

## 2021-12-16 NOTE — ANESTHESIA POSTPROCEDURE EVALUATION
Patient: José Luis Blackburn    Procedure Summary     Date: 12/15/21 Room / Location: [place] [address] / SURGERY [facility]    Anesthesia Start: 1621 Anesthesia Stop: 1712    Procedure: GASTRECTOMY, SLEEVE, LAPAROSCOPIC (Abdomen) Diagnosis: (MORBID OBESITY)    Surgeons: Edu Luis M.D. Responsible Provider: Sen Luz M.D.    Anesthesia Type: general ASA Status: 3          Final Anesthesia Type: general  Last vitals  BP   Blood Pressure: 148/89    Temp   37.5 °C (99.5 °F)    Pulse   82   Resp   18    SpO2   99 %      Anesthesia Post Evaluation    Patient location during evaluation: PACU  Patient participation: complete - patient participated  Level of consciousness: awake and alert  Pain score: 2    Airway patency: patent  Anesthetic complications: no  Cardiovascular status: hemodynamically stable  Respiratory status: acceptable  Hydration status: euvolemic    PONV: none          No complications documented.     Nurse Pain Score: 2 (NPRS)

## 2021-12-16 NOTE — OP REPORT
NAME:  José Luis Blackburn  MRN:  [id number]  :  1975      DATE OF OPERATION: 12/15/2021    PREOPERATIVE DIAGNOSIS: Morbid Obesity with medical sequelae    POSTOPERATIVE DIAGNOSIS: Morbid Obesity with medical sequelae    OPERATION PERFORMED: 1.  Laparoscopic Sleeve Gastrectomy    SURGEON: Edu Luis MD    ASSISTANT:  Alyssa Pickard PA-C, PA-C    ANESTHESIOLOGIST:  Anesthesiologist: Sen Luz M.D.    ANESTHESIA: General endotracheal anesthesia.     SPECIMEN: Stomach    ESTIMATED BLOOD LOSS: <10cc.     INDICATIONS: The patient is a 46 y.o. female with a diagnosis of morbid obesity with medical sequelae. She is taken to the operating room today for Laparoscopic Sleeve Gastrectomy.     PROCEDURE: Following informed consent, the patient was properly identified, taken to the operating room, and placed in the supine position where general endotracheal anesthesia was administered. Intravenous antibiotics were administered by the anesthesiologist in the correct time interval. Sequential compression devices were employed. The abdomen was prepped and draped into a sterile field.     An optical entry bladeless  trocar was utilized and pneumoperitoneum carefully established in the usual fashion.  The bladeless 5 mm separator trocar was introduced and the 5 mm lens/camera was passed into the peritoneal cavity.  Three additional separator trocars were placed under direct vision.  A 5 mm Nadiya-type liver retractor was placed into position.  This was used to elevate the left sided segment of the liver.  It was secured to the patients right side with a robot arm.  Careful inspection revealed no untoward events with placement of the trocars.    The gastrocolic omentum was examined and dissected with the ligasure device and a point on the distal antrum was selected to begin the sleeve gastrectomy.  A 40 Azerbaijani bougie was then passed down into the antrum.  A careful inspection at the hiatus demonstrated no  significant hiatal hernia which would require repair or risk significant reflux. A echelon linear stapler with a thick-tissue cartridges, was employed to divide partway across the stomach.  With the bougie in position, the stapler was then used to march proximally along the stomach and transsection of the stomach was performed, beginning 5 cm proximal to the pylorus in the method of the sleeve gastrectomy.  The greater curvature aspect of the stomach was then dissected and the greater curvature vessels and short gastric vessels were divided with the ligasure.      The last endomechanical stapler firings were used to complete the transection of the stomach.  Hemoclips were used if any site exhibited oozing. Careful inspection of the staple line demonstrated excellent, meticulous hemostasis and a completely intact staple line with seemless tissue approximation.  Seromuscular sutures were placed using polysorb suture to further secure the staple line.  The liver exhibited mild hepatic steatosis.  The bougie was then removed.     The large endocatch bag was then used to retrieve the stomach specimen.  Tisseal fibrin glue sealant was sprayed along the entire staple line. The ports were removed under direct vision and the pneumoperitoneum was allowed to escape. The fascia of this port was closed with 0-Vicryl suture.  The port sites were then irrigated well.  The port site skin incisions were closed with interrupted 4-0 Vicryl subcuticular sutures.  Steri-Strips and Benzoin were applied beneath sterile Band-Aids.     The patient tolerated the procedure well and there were no apparent complications. All sponge, needle, and instrument counts were correct on 2 separate occasions. She was awakened, extubated, and transferred to the recovery room in satisfactory condition.       ____________________________________   Edu Luis MD  DD: 12/15/2021  4:29 PM    CC:  [facility];

## 2021-12-16 NOTE — ANESTHESIA PROCEDURE NOTES
Airway    Date/Time: 12/15/2021 4:25 PM  Performed by: Sen Luz M.D.  Authorized by: Sen Luz M.D.     Location:  OR  Urgency:  Elective  Indications for Airway Management:  Anesthesia      Spontaneous Ventilation: absent    Sedation Level:  Deep  Preoxygenated: Yes    Patient Position:  Sniffing  Final Airway Type:  Endotracheal airway  Final Endotracheal Airway:  ETT  Cuffed: Yes    Technique Used for Successful ETT Placement:  Direct laryngoscopy    Insertion Site:  Oral  Blade Type:  Kimball  Laryngoscope Blade/Videolaryngoscope Blade Size:  2  ETT Size (mm):  7.5  Measured from:  Teeth  ETT to Teeth (cm):  21  Placement Verified by: auscultation and capnometry    Cormack-Lehane Classification:  Grade I - full view of glottis  Number of Attempts at Approach:  1  Number of Other Approaches Attempted:  0

## 2021-12-16 NOTE — ANESTHESIA TIME REPORT
Anesthesia Start and Stop Event Times     Date Time Event    12/15/2021 1621 Ready for Procedure     1621 Anesthesia Start     1712 Anesthesia Stop        Responsible Staff  12/15/21    Name Role Begin End    Sen Luz M.D. Anesth 1621 1712        Preop Diagnosis (Free Text):  Pre-op Diagnosis     MORBID OBESITY        Preop Diagnosis (Codes):    Premium Reason  B. 1st Call    Comments:

## 2021-12-16 NOTE — PROGRESS NOTES
Surgical Progress Note    Author: Anika Garcia P.A.-C. Date & Time created: 2021   8:15 AM     Interval Events:  46 year old female [id number] Sleeve Gastrectomy, Tolerating clears without nausea/vomiting. Admits to typical incisional abdominal pain, controlled with medication.  She is feeling not well with a mix of the nausea and pain medication, will continue to monitor to ensure patient feeling better. Pt is ambulating and voiding.     Review of Systems   Constitutional: Negative for chills and fever.   Respiratory: Negative for cough and shortness of breath.    Cardiovascular: Negative for chest pain and palpitations.   Gastrointestinal: Positive for abdominal pain (incisional). Negative for diarrhea, heartburn, nausea and vomiting.   Genitourinary: Negative for dysuria.     Hemodynamics:  Temp (24hrs), Av.8 °C (98.2 °F), Min:35.9 °C (96.7 °F), Max:37.5 °C (99.5 °F)  Temperature: 37.3 °C (99.2 °F)  Pulse  Av.5  Min: 62  Max: 100   Blood Pressure: 131/75     Respiratory:    Respiration: 18, Pulse Oximetry: 92 %        RUL Breath Sounds: Clear, RML Breath Sounds: Diminished, RLL Breath Sounds: Diminished, SURJIT Breath Sounds: Clear, LLL Breath Sounds: Diminished  Neuro:  GCS       Fluids:    Intake/Output Summary (Last 24 hours) at 2021 0815  Last data filed at 2021 0000  Gross per 24 hour   Intake 666 ml   Output 420 ml   Net 246 ml     Weight: 118 kg (260 lb 5.8 oz)  Current Diet Order   Procedures   • Diet Order Diet: Clear Liquid; Miscellaneous modifications: (optional): Bariatric     Physical Exam  Constitutional:       Appearance: Normal appearance. She is obese.   HENT:      Head: Normocephalic and atraumatic.      Nose: Nose normal.      Mouth/Throat:      Mouth: Mucous membranes are moist.   Eyes:      Conjunctiva/sclera: Conjunctivae normal.   Cardiovascular:      Rate and Rhythm: Normal rate and regular rhythm.   Pulmonary:      Effort: Pulmonary effort is normal. No  respiratory distress.   Abdominal:      General: There is distension.      Palpations: Abdomen is soft.      Tenderness: There is abdominal tenderness. There is no guarding or rebound.   Musculoskeletal:         General: Normal range of motion.      Cervical back: Normal range of motion.   Skin:     General: Skin is warm and dry.      Coloration: Skin is not jaundiced.      Findings: No erythema or rash.   Neurological:      Mental Status: She is alert and oriented to person, place, and time.   Psychiatric:         Mood and Affect: Mood normal.       Labs:  Recent Results (from the past 24 hour(s))   CBC without Differential (blood)    Collection Time: 12/16/21  1:02 AM   Result Value Ref Range    WBC 9.7 4.8 - 10.8 K/uL    RBC 4.77 4.20 - 5.40 M/uL    Hemoglobin 14.3 12.0 - 16.0 g/dL    Hematocrit 43.5 37.0 - 47.0 %    MCV 91.2 81.4 - 97.8 fL    MCH 30.0 27.0 - 33.0 pg    MCHC 32.9 (L) 33.6 - 35.0 g/dL    RDW 43.0 35.9 - 50.0 fL    Platelet Count 201 164 - 446 K/uL    MPV 10.9 9.0 - 12.9 fL   Comp Metabolic Panel (CMP)    Collection Time: 12/16/21  1:02 AM   Result Value Ref Range    Sodium 140 135 - 145 mmol/L    Potassium 4.3 3.6 - 5.5 mmol/L    Chloride 106 96 - 112 mmol/L    Co2 21 20 - 33 mmol/L    Anion Gap 13.0 7.0 - 16.0    Glucose 198 (H) 65 - 99 mg/dL    Bun 16 8 - 22 mg/dL    Creatinine 0.43 (L) 0.50 - 1.40 mg/dL    Calcium 8.8 8.5 - 10.5 mg/dL    AST(SGOT) 24 12 - 45 U/L    ALT(SGPT) 36 2 - 50 U/L    Alkaline Phosphatase 92 30 - 99 U/L    Total Bilirubin 0.3 0.1 - 1.5 mg/dL    Albumin 4.3 3.2 - 4.9 g/dL    Total Protein 7.1 6.0 - 8.2 g/dL    Globulin 2.8 1.9 - 3.5 g/dL    A-G Ratio 1.5 g/dL   ESTIMATED GFR    Collection Time: 12/16/21  1:02 AM   Result Value Ref Range    GFR If African American >60 >60 mL/min/1.73 m 2    GFR If Non African American >60 >60 mL/min/1.73 m 2     Medical Decision Making, by Problem:  There are no active hospital problems to display for this patient.    Plan:  Pt is alert  and oriented, NAD. Breathing unlabored. Tolerating PO.  Incisions ok. VS stable. Labs reviewed.  Leukocytosis, likely reactive. Encouraged ambulation and incentive spirometry.  Wean O2. Pt may need to stay another night for nausea, pain control, hypoxia, will see how the day progresses. Otherwise okay for discharge later this afternoon.   May need to stay another night mostly for pain management.   Pt also seen and examined by Dr. Luis.    Quality Measures:  Quality-Core Measures   Reviewed items::  Labs reviewed  Clark catheter::  No Clark  DVT prophylaxis pharmacological::  Enoxaparin (Lovenox)  DVT prophylaxis - mechanical:  SCDs  Ulcer Prophylaxis::  Yes      Discussed patient condition with Patient and Dr. Luis

## 2021-12-16 NOTE — PROGRESS NOTES
4 Eyes Skin Assessment Completed by Liat, PRABHA and PRABHA Lyon.    Head WDL  Ears WDL  Nose WDL  Mouth WDL  Neck WDL  Breast/Chest WDL  Shoulder Blades WDL  Spine WDL  (R) Arm/Elbow/Hand Bruising  (L) Arm/Elbow/Hand WDL  Abdomen Incision 5x lap sites with bandaids, CDI, slight drainage on top left.   Groin WDL  Scrotum/Coccyx/Buttocks WDL  (R) Leg WDL  (L) Leg WDL  (R) Heel/Foot/Toe WDL  (L) Heel/Foot/Toe WDL          Devices In Places Blood Pressure Cuff, Pulse Ox, SCD's and Oxy Mask      Interventions In Place Pillows    Possible Skin Injury No    Pictures Uploaded Into Epic N/A  Wound Consult Placed N/A  RN Wound Prevention Protocol Ordered No

## 2021-12-17 VITALS
RESPIRATION RATE: 17 BRPM | WEIGHT: 260.36 LBS | HEART RATE: 94 BPM | OXYGEN SATURATION: 94 % | BODY MASS INDEX: 38.56 KG/M2 | HEIGHT: 69 IN | DIASTOLIC BLOOD PRESSURE: 86 MMHG | TEMPERATURE: 97.7 F | SYSTOLIC BLOOD PRESSURE: 122 MMHG

## 2021-12-17 LAB
25(OH)D3 SERPL-MCNC: 26 NG/ML (ref 30–80)
ALBUMIN SERPL BCP-MCNC: 3.8 G/DL (ref 3.2–4.9)
ALBUMIN/GLOB SERPL: 1.4 G/DL
ALP SERPL-CCNC: 88 U/L (ref 30–99)
ALT SERPL-CCNC: 34 U/L (ref 2–50)
ANION GAP SERPL CALC-SCNC: 13 MMOL/L (ref 7–16)
AST SERPL-CCNC: 27 U/L (ref 12–45)
BILIRUB SERPL-MCNC: 0.5 MG/DL (ref 0.1–1.5)
BUN SERPL-MCNC: 8 MG/DL (ref 8–22)
CALCIUM SERPL-MCNC: 8.7 MG/DL (ref 8.5–10.5)
CHLORIDE SERPL-SCNC: 109 MMOL/L (ref 96–112)
CO2 SERPL-SCNC: 20 MMOL/L (ref 20–33)
CREAT SERPL-MCNC: 0.44 MG/DL (ref 0.5–1.4)
ERYTHROCYTE [DISTWIDTH] IN BLOOD BY AUTOMATED COUNT: 43.6 FL (ref 35.9–50)
GLOBULIN SER CALC-MCNC: 2.8 G/DL (ref 1.9–3.5)
GLUCOSE SERPL-MCNC: 106 MG/DL (ref 65–99)
HCT VFR BLD AUTO: 38.9 % (ref 37–47)
HGB BLD-MCNC: 12.8 G/DL (ref 12–16)
MCH RBC QN AUTO: 29.7 PG (ref 27–33)
MCHC RBC AUTO-ENTMCNC: 32.9 G/DL (ref 33.6–35)
MCV RBC AUTO: 90.3 FL (ref 81.4–97.8)
PLATELET # BLD AUTO: 194 K/UL (ref 164–446)
PMV BLD AUTO: 10.9 FL (ref 9–12.9)
POTASSIUM SERPL-SCNC: 4.1 MMOL/L (ref 3.6–5.5)
PROT SERPL-MCNC: 6.6 G/DL (ref 6–8.2)
RBC # BLD AUTO: 4.31 M/UL (ref 4.2–5.4)
SODIUM SERPL-SCNC: 142 MMOL/L (ref 135–145)
WBC # BLD AUTO: 11.1 K/UL (ref 4.8–10.8)

## 2021-12-17 PROCEDURE — 700102 HCHG RX REV CODE 250 W/ 637 OVERRIDE(OP): Performed by: PHYSICIAN ASSISTANT

## 2021-12-17 PROCEDURE — 700111 HCHG RX REV CODE 636 W/ 250 OVERRIDE (IP): Performed by: PHYSICIAN ASSISTANT

## 2021-12-17 PROCEDURE — A9270 NON-COVERED ITEM OR SERVICE: HCPCS | Performed by: PHYSICIAN ASSISTANT

## 2021-12-17 PROCEDURE — 80053 COMPREHEN METABOLIC PANEL: CPT

## 2021-12-17 PROCEDURE — 700101 HCHG RX REV CODE 250: Performed by: PHYSICIAN ASSISTANT

## 2021-12-17 PROCEDURE — 85027 COMPLETE CBC AUTOMATED: CPT

## 2021-12-17 RX ADMIN — ENOXAPARIN SODIUM 40 MG: 40 INJECTION SUBCUTANEOUS at 05:53

## 2021-12-17 RX ADMIN — POTASSIUM CHLORIDE AND SODIUM CHLORIDE: 900; 150 INJECTION, SOLUTION INTRAVENOUS at 01:22

## 2021-12-17 RX ADMIN — FAMOTIDINE 20 MG: 10 INJECTION INTRAVENOUS at 05:53

## 2021-12-17 RX ADMIN — ACETAMINOPHEN 1000 MG: 500 TABLET ORAL at 11:42

## 2021-12-17 RX ADMIN — POTASSIUM CHLORIDE AND SODIUM CHLORIDE: 900; 150 INJECTION, SOLUTION INTRAVENOUS at 08:04

## 2021-12-17 RX ADMIN — ACETAMINOPHEN 1000 MG: 500 TABLET ORAL at 05:54

## 2021-12-17 ASSESSMENT — LIFESTYLE VARIABLES
ON A TYPICAL DAY WHEN YOU DRINK ALCOHOL HOW MANY DRINKS DO YOU HAVE: 0
AVERAGE NUMBER OF DAYS PER WEEK YOU HAVE A DRINK CONTAINING ALCOHOL: 0
EVER FELT BAD OR GUILTY ABOUT YOUR DRINKING: NO
HOW MANY TIMES IN THE PAST YEAR HAVE YOU HAD 5 OR MORE DRINKS IN A DAY: 0
ALCOHOL_USE: NO
HAVE YOU EVER FELT YOU SHOULD CUT DOWN ON YOUR DRINKING: NO
EVER HAD A DRINK FIRST THING IN THE MORNING TO STEADY YOUR NERVES TO GET RID OF A HANGOVER: NO
TOTAL SCORE: 0
HAVE PEOPLE ANNOYED YOU BY CRITICIZING YOUR DRINKING: NO
DOES PATIENT WANT TO STOP DRINKING: NO
TOTAL SCORE: 0
TOTAL SCORE: 0
CONSUMPTION TOTAL: NEGATIVE

## 2021-12-17 ASSESSMENT — ENCOUNTER SYMPTOMS
FEVER: 0
NAUSEA: 0
HEARTBURN: 0
DIARRHEA: 0
CHILLS: 0
VOMITING: 0
PALPITATIONS: 0
COUGH: 0
SHORTNESS OF BREATH: 0
ABDOMINAL PAIN: 1

## 2021-12-17 NOTE — DISCHARGE INSTRUCTIONS
Discharge Instructions    Discharged to home by car with relative. Discharged via wheelchair, hospital escort: Yes.  Special equipment needed: Not Applicable    Be sure to schedule a follow-up appointment with your primary care doctor or any specialists as instructed.     Discharge Plan:        I understand that a diet low in cholesterol, fat, and sodium is recommended for good health. Unless I have been given specific instructions below for another diet, I accept this instruction as my diet prescription.   Other diet: bariatric    Special Instructions:   Bariatric D/C instructions:   1. DIET: Follow the diet progression detailed in your post-op booklet.  Progressing as instructed will make for a smooth transition after surgery and prevent any pain associated with eating foods before your stomach is ready or eating too much.  Water and hydration is much more important than food intake the first week or two after surgery.  Drink enough water to keep your urine pale yellow.  Increase water intake if your urine is a darker yellow or if have burning with urination.  Nausea and vomiting once or twice after you leave the hospital is normal.  Sip fluids continually and stay hydrated.     2.  SUPPLEMENTS: Start your supplements 1 week after surgery.  You may need to cut some supplements in half initially.  Follow the guidelines from your supplement handout from your pre-op class.     3. ACTIVITIES: After discharge from the hospital, you may resume full routine activities. However, there should be no heavy lifting (greater than 15 pounds) and no strenuous activities until after your follow-up visit. Otherwise, routine activities of daily living are acceptable.  More movement and walking after surgery the better.     4. DRIVING: You may drive whenever you are off pain medications and are able to perform the activities needed to drive, i.e. turning, bending, twisting, etc.     5. BATHING AND WOUND CARE: You may get the wound  wet 2 days after surgery. You may shower, but do not submerge in a bath for at least a week. Dressings may come off after 48 hours. Please leave the steri-strips in place, they will fall off over 5-7 days. You may notice some clear or slightly bloody drainage from your wounds and there may be some mild redness or bruising around your incision sites.  This is normal.     6. BOWEL FUNCTION: Constipation is common after an operation, especially with pain medications. The combination of pain medication and decreased activity level can cause constipation in otherwise normal patients. If you feel this is occurring, take a laxative (Milk of Magnesia, Miralax, etc.) until the problem has resolved.  Diarrhea the first few days after surgery can also be normal.  You may notice that it is dark in color or see blood, which is also normal and should subside in the first week post-op. 7. PAIN MEDICATION: You have been given a prescription for pain medication preoperatively.  Please take these as directed. It is important to remember not to take medications on an empty stomach as this may cause nausea.  For minimal discomfort you may use liquid Tylenol 650 mg every 4 hours.  DO NOT exceed 4,000 mg of Tylenol in 24 hours.  DO NOT use non steroidal anti-inflamatory medication such as: Asprin, Ibuprofen, Advil, Motrin, Aleve, or steroids.  These medication can cause ulcers after weight loss surgery.     8.CALL IF YOU HAVE: (1) Fevers to more than 101.5 F, (2) leg pain or swelling (3) Drainage or fluid from incision that may be foul smelling, increased tenderness or soreness at the wound or the wound edges are no longer together, redness or swelling at the incision site. (4) Night Sweats (5) Shaking, Chills (6) Persistent Nausea or Vomiting for over 24 hours.  Use your anti nausea medication as prescribed. (7) Call 911 if sudden onset of chest pain or shortness of breath that does not improve with 5-10 min of rest. 9. APPOINTMENT:  Contact our office at [phone] for a follow-up appointment in 1-2 weeks following your procedure.  Our office hours are Monday-Friday, 8am-5pm.  Please try to call during these hours when we are better able to assist you. If you have any additional questions, please do not hesitate to call the office and speak to either myself or the physician on call. Office address: [facility]. [address] [place] [address] [address] [place] [address]          · Is patient discharged on Warfarin / Coumadin?   No       Sleeve Gastrectomy, Care After  This sheet gives you information about how to care for yourself after your procedure. Your health care provider may also give you more specific instructions. If you have problems or questions, contact your health care provider.  What can I expect after the procedure?  After the procedure, it is common to have:  · Pain in the abdomen.  · Decreased appetite.  · Clear fluid leaking through the small tube (drain) that comes from your incision site.  Follow these instructions at home:  Medicines  · Take over-the-counter and prescription medicines only as told by your health care provider.  · Do not drive for 24 hours if you were given a sedative during your procedure.  · Do not drive or use heavy machinery while taking prescription pain medicine.  Incision and drain care    · Follow instructions from your health care provider about how to take care of your incisions. Make sure you:  ? Wash your hands with soap and water before and after you change your bandage (dressing). If soap and water are not available, use hand .  ? Change your dressing as told by your health care provider.  ? Leave stitches (sutures), skin glue, or adhesive strips in place. These skin closures may need to be in place for 2 weeks or longer. If adhesive strip edges start to loosen and curl up, you may trim the loose edges. Do not remove adhesive strips completely unless your health care provider tells  you to do that.  · Keep the area around your incisions and your drain clean and dry.  · Check your incision areas every day for signs of infection. Check for:  ? Redness, swelling, or pain.  ? Fluid or blood.  ? Warmth.  ? Pus or a bad smell.  · Empty your drain every day. Follow instructions from your health care provider about recording the amount of fluid that comes from your drain. Make note of any changes in the amount or appearance of the fluid.  Activity    · Rest as told by your health care provider.  · Avoid sitting for a long time without moving. Get up to take short walks every 1-2 hours. This is important to improve blood flow and breathing. Ask for help if you feel weak or unsteady.  · Return to your normal activities as told by your health care provider. Ask your health care provider what activities are safe for you.  · Do not lift anything that is heavier than 10 lb (4.5 kg), or the limit that you are told, until your health care provider says that it is safe.  · Avoid intense physical activity for as long as told by your health care provider.  Eating and drinking  · Follow instructions from your health care provider about eating or drinking restrictions. You will be given instructions about the type, the size, and the timing of your meals.  ? Keep track of any foods that cause discomfort, such as bloating or cramping.  ? Eat healthy foods. Avoid foods that are high in fat or sugar.  · Stop eating when you feel full.  · Take supplements only as told by your health care provider.  · Drink enough fluid to keep your urine pale yellow.  General instructions  · Do not take baths, swim, or use a hot tub until your health care provider approves. Ask your health care provider if you may take showers. You may only be allowed to take sponge baths.  · Do not use any products that contain nicotine or tobacco, such as cigarettes, e-cigarettes, and chewing tobacco. If you need help quitting, ask your health care  provider.  · Wear compression stockings as told by your health care provider. These stockings help to prevent blood clots and reduce swelling in your legs.  · Do breathing exercises as told by your health care provider.  · Keep all follow-up visits as told by your health care provider. This is important.  Contact a health care provider if you have:  · Pain that gets worse or does not get better with medicine.  · Redness, swelling, or pain around your incisions.  · Fluid or blood coming from your incisions.  · Incisions that feel warm to the touch.  · Pus or a bad smell coming from your incisions.  · A fever or chills.  · Problems with your drain.  · Green or bad-smelling fluid leaking from your drain.  Get help right away if you have:  · Trouble breathing.  · Severe pain, especially in your legs.  Summary  · After the procedure, it is common to have pain in the abdomen, decreased appetite, and clear fluid leaking from the drain in an incision site.  · Take over-the-counter and prescription medicines only as told by your health care provider.  · Follow instructions from your health care provider about how to take care of your incisions. Report any signs of infection to your health care provider.  · After surgery, get up to take short walks every 1-2 hours. This is important to improve blood flow and breathing. Follow instructions from your health care provider about eating or drinking restrictions.  · Get help right away if you have trouble breathing or severe pain, especially in your legs.  This information is not intended to replace advice given to you by your health care provider. Make sure you discuss any questions you have with your health care provider.  Document Released: 10/14/2010 Document Revised: 04/09/2020 Document Reviewed: 08/06/2019  Elsevier Patient Education © 2020 [employer]      Depression / Suicide Risk    As you are discharged from this [facility] facility, it is important to learn how to  keep safe from harming yourself.    Recognize the warning signs:  · Abrupt changes in personality, positive or negative- including increase in energy   · Giving away possessions  · Change in eating patterns- significant weight changes-  positive or negative  · Change in sleeping patterns- unable to sleep or sleeping all the time   · Unwillingness or inability to communicate  · Depression  · Unusual sadness, discouragement and loneliness  · Talk of wanting to die  · Neglect of personal appearance   · Rebelliousness- reckless behavior  · Withdrawal from people/activities they love  · Confusion- inability to concentrate     If you or a loved one observes any of these behaviors or has concerns about self-harm, here's what you can do:  · Talk about it- your feelings and reasons for harming yourself  · Remove any means that you might use to hurt yourself (examples: pills, rope, extension cords, firearm)  · Get professional help from the community (Mental Health, Substance Abuse, psychological counseling)  · Do not be alone:Call your Safe Contact- someone whom you trust who will be there for you.  · Call your local CRISIS HOTLINE 292-8271 or 541-245-0771  · Call your local Children's Mobile Crisis Response Team [place] (823) 789-4518 or www.Shopcaster  · Call the toll free National Suicide Prevention Hotlines   · National Suicide Prevention Lifeline 642-207-XHFQ (7043)  · National Hope Line Network 800-SUICIDE (418-0411)

## 2021-12-17 NOTE — PROGRESS NOTES
Bedside report received.  Assessment complete.  A&O x 4. Patient calls appropriately.  Pt SB assist.  Medicated for  Pain and N/V per MAR.  Patient denies SOB.  SCD's on.  Review plan with of care with patient. Call light and personal belongings with in reach. Hourly rounding in place. All needs met at this time.

## 2021-12-17 NOTE — PROGRESS NOTES
Pt being dc'd to home with no needs. Pt discharge medications none. IV dc'd. Dc instructions discussed with patient in discharge lounge. All questions answered.  Patient agreeable to dc plan.  Bedside RN to confirm that patient has all belongings at dc and that all home care needs have been arranged.

## 2021-12-17 NOTE — CARE PLAN
The patient is Stable - Low risk of patient condition declining or worsening    Progress made toward(s) clinical / shift goals:      Problem: Pain - Standard  Goal: Alleviation of pain or a reduction in pain to the patient’s comfort goal  Outcome: Progressing  Note: Pain level frequently assessed. PRN oxycodone administered per MAR. Patient reports reduction in pain level post medication administration.     Problem: Knowledge Deficit - Standard  Goal: Patient and family/care givers will demonstrate understanding of plan of care, disease process/condition, diagnostic tests and medications  Outcome: Progressing  Note: Plan of care discussed with patient. Medication education provided. All questions addressed.

## 2021-12-17 NOTE — DISCHARGE SUMMARY
Discharge Summary    CHIEF COMPLAINT ON ADMISSION  No chief complaint on file.      Reason for Admission  MORBID OBESITY     Admission Date  12/15/2021    CODE STATUS  Full Code    HPI & HOSPITAL COURSE  This is a 46 y.o. female here with [id number] Sleeve Gastrectomy   No notes on file    Therefore, she is discharged in good and stable condition to home with close outpatient follow-up.    The patient met 2-midnight criteria for an inpatient stay at the time of discharge.    Discharge Date  17 Dec 2021    FOLLOW UP ITEMS POST DISCHARGE  As scheduled    DISCHARGE DIAGNOSES  Active Problems:    * No active hospital problems. *  Resolved Problems:    * No resolved hospital problems. *      FOLLOW UP  No future appointments.  No follow-up provider specified.    MEDICATIONS ON DISCHARGE     Medication List      CONTINUE taking these medications      Instructions   acetaminophen 500 MG Tabs  Commonly known as: TYLENOL   Take 500-1,000 mg by mouth every 6 hours as needed.  Dose: 500-1,000 mg     albuterol 108 (90 Base) MCG/ACT Aers inhalation aerosol   Inhale 2 Puffs every 6 hours as needed for Shortness of Breath.  Dose: 2 Puff     COLLAGEN PO   Take  by mouth.     docusate sodium 100 MG Caps  Commonly known as: COLACE   Take 100 mg by mouth 2 times a day.  Dose: 100 mg     ibuprofen 600 MG Tabs  Commonly known as: MOTRIN   Take 600 mg by mouth every 6 hours as needed.  Dose: 600 mg     LORAZEPAM PO   Take  by mouth.     losartan 50 MG Tabs  Commonly known as: COZAAR   Take 50 mg by mouth every day.  Dose: 50 mg     progesterone 200 MG capsule  Commonly known as: PROMETRIUM   TAKE 1 CAPSULE BY MOUTH ONCE DAILY FOR 12 DAYS EVERY 3 4 MONTHS     promethazine-dextromethorphan 6.25-15 MG/5ML syrup  Commonly known as: PROMETHAZINE-DM   Take 5 mL by mouth 4 times a day as needed for Cough.  Dose: 5 mL     Vitamin C 1000 MG Tabs   Take  by mouth.     VITAMIN D3 PO   Take 50,000 Units by mouth. Once a week  Dose: 50,000  "Units            Allergies  Allergies   Allergen Reactions   • Contrast Media With Iodine [Iodine]      Pt reports becoming SOB with contrast media with iodine in the past   • Gabapentin      Cognitive side effects    • Other Drug      \"some epilepsy medications, unsure of name\"       DIET  Orders Placed This Encounter   Procedures   • Diet Order Diet: Clear Liquid; Miscellaneous modifications: (optional): Bariatric     Standing Status:   Standing     Number of Occurrences:   1     Order Specific Question:   Diet:     Answer:   Clear Liquid [10]     Order Specific Question:   Miscellaneous modifications: (optional)     Answer:   Bariatric [3]       ACTIVITY  As tolerated.  Weight bearing as tolerated    CONSULTATIONS  none    PROCEDURES  LSG    LABORATORY  Lab Results   Component Value Date    SODIUM 142 12/17/2021    POTASSIUM 4.1 12/17/2021    CHLORIDE 109 12/17/2021    CO2 20 12/17/2021    GLUCOSE 106 (H) 12/17/2021    BUN 8 12/17/2021    CREATININE 0.44 (L) 12/17/2021        Lab Results   Component Value Date    WBC 11.1 (H) 12/17/2021    HEMOGLOBIN 12.8 12/17/2021    HEMATOCRIT 38.9 12/17/2021    PLATELETCT 194 12/17/2021        Total time of the discharge process exceeds 5 minutes.  "

## 2021-12-17 NOTE — PROGRESS NOTES
Received report of patient at start of shift. Patient is AOx4, PRN medications administered for pain and nausea. Assessment complete, patient on room air. Discharge orders received. Patient requesting to stay additional night in hospital, ok per physician assistantAnika. Patient updated on plan of care, encouraged to use call light for any needs/assistance. Safety education provided.

## 2021-12-17 NOTE — PROGRESS NOTES
Received report of patient at start of shift. Patient is AOx4, scheduled tylenol administered for complaints of pain. Assessment complete, patient on room air. Patient ambulating with standby assist from family, steady gait noted. Paged placed to physician assistantReagan. Asked Reagan if patient is to discharge today as discharge order from yesterday . Received order to discharge patient. Patient updated on plan of care/discharge plan. Safety education provided.

## 2021-12-17 NOTE — PROGRESS NOTES
Surgical Progress Note    Author: Reagan Galvan P.A.-C. Date & Time created: 2021   7:15 AM     Interval Events:  46 year old female [id number] Sleeve Gastrectomy, Tolerating clears without nausea/vomiting. Admits to typical incisional abdominal pain, controlled with medication.  She is feeling not well. Pt is ambulating and voiding.     Review of Systems   Constitutional: Negative for chills and fever.   Respiratory: Negative for cough and shortness of breath.    Cardiovascular: Negative for chest pain and palpitations.   Gastrointestinal: Positive for abdominal pain (incisional). Negative for diarrhea, heartburn, nausea and vomiting.   Genitourinary: Negative for dysuria.     Hemodynamics:  Temp (24hrs), Av.9 °C (98.5 °F), Min:36.5 °C (97.7 °F), Max:37.3 °C (99.2 °F)  Temperature: 36.5 °C (97.7 °F)  Pulse  Av.9  Min: 62  Max: 100   Blood Pressure: 122/86     Respiratory:    Respiration: 17, Pulse Oximetry: 90 %     Work Of Breathing / Effort: Shallow  RUL Breath Sounds: Clear, RML Breath Sounds: Clear, RLL Breath Sounds: Clear, SURJIT Breath Sounds: Clear, LLL Breath Sounds: Clear  Neuro:  GCS       Fluids:    Intake/Output Summary (Last 24 hours) at 2021 0815  Last data filed at 2021 0000  Gross per 24 hour   Intake 666 ml   Output 420 ml   Net 246 ml        Current Diet Order   Procedures   • Diet Order Diet: Clear Liquid; Miscellaneous modifications: (optional): Bariatric     Physical Exam  Constitutional:       Appearance: Normal appearance. She is obese.   HENT:      Head: Normocephalic and atraumatic.      Nose: Nose normal.      Mouth/Throat:      Mouth: Mucous membranes are moist.   Eyes:      Conjunctiva/sclera: Conjunctivae normal.   Cardiovascular:      Rate and Rhythm: Normal rate and regular rhythm.   Pulmonary:      Effort: Pulmonary effort is normal. No respiratory distress.   Abdominal:      General: There is no distension.      Palpations: Abdomen is soft.       Tenderness: There is abdominal tenderness. There is no guarding or rebound.   Musculoskeletal:         General: Normal range of motion.      Cervical back: Normal range of motion.   Skin:     General: Skin is warm and dry.      Coloration: Skin is not jaundiced.      Findings: No erythema or rash.   Neurological:      Mental Status: She is alert and oriented to person, place, and time.   Psychiatric:         Mood and Affect: Mood normal.       Labs:  Recent Results (from the past 24 hour(s))   Histology Request    Collection Time: 12/16/21  8:23 AM   Result Value Ref Range    Pathology Request Sent to Histo    CBC without Differential (blood)    Collection Time: 12/17/21  3:17 AM   Result Value Ref Range    WBC 11.1 (H) 4.8 - 10.8 K/uL    RBC 4.31 4.20 - 5.40 M/uL    Hemoglobin 12.8 12.0 - 16.0 g/dL    Hematocrit 38.9 37.0 - 47.0 %    MCV 90.3 81.4 - 97.8 fL    MCH 29.7 27.0 - 33.0 pg    MCHC 32.9 (L) 33.6 - 35.0 g/dL    RDW 43.6 35.9 - 50.0 fL    Platelet Count 194 164 - 446 K/uL    MPV 10.9 9.0 - 12.9 fL   Comp Metabolic Panel (CMP)    Collection Time: 12/17/21  3:17 AM   Result Value Ref Range    Sodium 142 135 - 145 mmol/L    Potassium 4.1 3.6 - 5.5 mmol/L    Chloride 109 96 - 112 mmol/L    Co2 20 20 - 33 mmol/L    Anion Gap 13.0 7.0 - 16.0    Glucose 106 (H) 65 - 99 mg/dL    Bun 8 8 - 22 mg/dL    Creatinine 0.44 (L) 0.50 - 1.40 mg/dL    Calcium 8.7 8.5 - 10.5 mg/dL    AST(SGOT) 27 12 - 45 U/L    ALT(SGPT) 34 2 - 50 U/L    Alkaline Phosphatase 88 30 - 99 U/L    Total Bilirubin 0.5 0.1 - 1.5 mg/dL    Albumin 3.8 3.2 - 4.9 g/dL    Total Protein 6.6 6.0 - 8.2 g/dL    Globulin 2.8 1.9 - 3.5 g/dL    A-G Ratio 1.4 g/dL   ESTIMATED GFR    Collection Time: 12/17/21  3:17 AM   Result Value Ref Range    GFR If African American >60 >60 mL/min/1.73 m 2    GFR If Non African American >60 >60 mL/min/1.73 m 2     Medical Decision Making, by Problem:  There are no active hospital problems to display for this  patient.    Plan:  Pt is alert and oriented, NAD. Breathing unlabored. Tolerating PO.  Incisions ok. VS stable. Labs reviewed.  Leukocytosis, likely reactive. Encouraged ambulation and incentive spirometry.  Okay for discharge later this am.  Pt also seen and examined by Dr. Luis.    Quality Measures:  Quality-Core Measures   Reviewed items::  Labs reviewed  Clark catheter::  No Clark  DVT prophylaxis pharmacological::  Enoxaparin (Lovenox)  DVT prophylaxis - mechanical:  SCDs  Ulcer Prophylaxis::  Yes      Discussed patient condition with Patient and Dr. Luis

## 2021-12-17 NOTE — CARE PLAN
The patient is Stable - Low risk of patient condition declining or worsening    Shift Goals  Clinical Goals: Pain control    Progress made toward(s) clinical / shift goals:  Medications administered per MAR.    Problem: Pain - Standard  Goal: Alleviation of pain or a reduction in pain to the patient’s comfort goal  Outcome: Progressing    Problem: Knowledge Deficit - Standard  Goal: Patient and family/care givers will demonstrate understanding of plan of care, disease process/condition, diagnostic tests and medications  Outcome: Progressing

## 2021-12-18 LAB — VIT B1 BLD-MCNC: 155 NMOL/L (ref 70–180)

## 2021-12-18 NOTE — PROGRESS NOTES
Late entry - Patient transferring to discharge lounge at this time. Discharge RN to provide d/c education, follow-up information, and remove PIV. Update provided to dg Marquez RN. Patient transporting via wheelchair. Belongings with patient at time of transfer.

## 2022-12-08 ENCOUNTER — APPOINTMENT (OUTPATIENT)
Dept: RADIOLOGY | Facility: IMAGING CENTER | Age: 47
End: 2022-12-08
Attending: STUDENT IN AN ORGANIZED HEALTH CARE EDUCATION/TRAINING PROGRAM
Payer: COMMERCIAL

## 2022-12-08 ENCOUNTER — OCCUPATIONAL MEDICINE (OUTPATIENT)
Dept: URGENT CARE | Facility: PHYSICIAN GROUP | Age: 47
End: 2022-12-08
Payer: OTHER MISCELLANEOUS

## 2022-12-08 ENCOUNTER — APPOINTMENT (OUTPATIENT)
Dept: RADIOLOGY | Facility: IMAGING CENTER | Age: 47
End: 2022-12-08
Attending: STUDENT IN AN ORGANIZED HEALTH CARE EDUCATION/TRAINING PROGRAM
Payer: OTHER MISCELLANEOUS

## 2022-12-08 VITALS
HEIGHT: 69 IN | RESPIRATION RATE: 16 BRPM | TEMPERATURE: 98 F | BODY MASS INDEX: 38.45 KG/M2 | OXYGEN SATURATION: 97 % | DIASTOLIC BLOOD PRESSURE: 94 MMHG | SYSTOLIC BLOOD PRESSURE: 140 MMHG | HEART RATE: 71 BPM
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
## 2022-12-08 DIAGNOSIS — S16.1XXA STRAIN OF NECK MUSCLE, INITIAL ENCOUNTER: ICD-10-CM

## 2022-12-08 DIAGNOSIS — M25.561 ACUTE PAIN OF RIGHT KNEE: ICD-10-CM

## 2022-12-08 PROCEDURE — 73564 X-RAY EXAM KNEE 4 OR MORE: CPT | Mod: TC,RT

## 2022-12-08 PROCEDURE — 99213 OFFICE O/P EST LOW 20 MIN: CPT | Performed by: STUDENT IN AN ORGANIZED HEALTH CARE EDUCATION/TRAINING PROGRAM

## 2022-12-08 NOTE — LETTER
[facility]  [address] [place] [address]. [address] - [place] [address]  Phone:  [phone] - Fax:  [phone]   [facility] Progress Report and Disability Certification  Date of Service: 12/8/2022   No Show:  No  Date / Time of Next Visit: 12/15/2022   Claim Information   Patient Name: José Luis Blackburn  Claim Number:     Employer: [employer]  Date of Injury: 12/6/2022     Insurer / TPA: [employer]  ID / SSN:     Occupation:   Diagnosis: Diagnoses of Acute pain of right knee, Strain of neck muscle, initial encounter, and Work related injury were pertinent to this visit.    Medical Information   Related to Industrial Injury? Yes    Subjective Complaints:  Today's date: 12/8/2022.  Visit #1.  Date of injury: 12/6/2022  CC: Right knee pain and neck pain  Mechanism injury: Patient was seated at her desk and while standing up her foot got caught inside the cords under the desk, she subsequently fell to the ground landing on her right knee.  She has developed some swelling of the knee but no significant bruising.  Says she has localized tenderness to palpation over the patella but no additional aggravating factors.  No discomfort with weightbearing. No instability or mechanical symptoms.  She tried taking Tylenol which has provided limited relief of symptoms.  No history of additional trauma or surgery to her right knee.     Following the fall the patient said her neck was somewhat sore but has gotten progressively worse over the last 24 hours.  Says symptoms are aggravated with looking down and gentle range of motion.  Tylenol is provided limited relief of symptoms.  Denies any radicular symptoms into bilateral upper extremities.  No history of similar symptoms or history of C-spine surgery.     Objective Findings: Gen: no acute distress, normal voice  Skin: dry, intact, moist mucosal membranes  Head: Atraumatic, normocephalic  Psych:  normal affect, normal judgement, alert, awake  Musculoskeletal: Right knee: Presence of edema.  No erythema or ecchymosis.  Possible effusion but hard to discern due to wearing jeans and habitus.  Mild tenderness palpation along the medial joint line and patella tendon.  Negative anterior/posterior drawer test.  Negative Lachman test.  No pain or laxity with varus or valgus stress.    C-spine: No erythema, ecchymosis or edema.  Loss of 5 degrees of extension and flexion secondary to mild discernible discomfort.  Full rotation.  No midline tenderness to palpation, step-off or crepitus.  Mild tenderness palpation along the upper margins of the left trapezius muscle.  Distal sensation and motor fully intact C5-C8 bilaterally.      RADIOLOGY RESULTS  DX-KNEE COMPLETE 4+ RIGHT    Result Date: 12/8/2022 12/8/2022 2:42 PM HISTORY/REASON FOR EXAM:  Pain/Deformity Following Trauma.  RIGHT anterior knee pain, ground-level fall several days ago. TECHNIQUE/EXAM DESCRIPTION AND NUMBER OF VIEWS:  4 views of the RIGHT knee. COMPARISON: None FINDINGS: No focal soft tissue swelling. No gross joint effusion. Joint spaces are preserved. No fracture or dislocation.     No fracture or dislocation of RIGHT knee.                   Pre-Existing Condition(s):     Assessment:   Initial Visit    Status: Additional Care Required  Permanent Disability:No    Plan:      Diagnostics:      Comments:       Disability Information   Status: Released to Full Duty    From:  12/8/2022  Through: 12/15/2022 Restrictions are:     Physical Restrictions   Sitting:    Standing:    Stooping:    Bending:      Squatting:    Walking:    Climbing:    Pushing:      Pulling:    Other:    Reaching Above Shoulder (L):   Reaching Above Shoulder (R):       Reaching Below Shoulder (L):    Reaching Below Shoulder (R):      Not to exceed Weight Limits   Carrying(hrs):   Weight Limit(lb):   Lifting(hrs):   Weight  Limit(lb):     Comments: Symptoms should be self-limiting.   X-rays were negative for any acute osseous abnormalities.  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  Recommended range of motion and light activity as tolerated, heat for the neck, ice for the knee and Tylenol as needed for symptomatic relief.  Follow-up in urgent care in 1 week for reevaluation.    Repetitive Actions   Hands: i.e. Fine Manipulations from Grasping:     Feet: i.e. Operating Foot Controls:     Driving / Operate Machinery:     Health Care Provider’s Original or Electronic Signature  Reggie Cruz D.O. Health Care Provider’s Original or Electronic Signature    Wolfgang Stacy DO MPH     Clinic Name / Location: [address]  [place] [address] Clinic Phone Number: Dept: [phone]   Appointment Time: 11:20 Am Visit Start Time: 2:21 PM   Check-In Time:  11:45 Am Visit Discharge Time:  3:59PM    Original-Treating Physician or Chiropractor    Page 2-Insurer/TPA    Page 3-Employer    Page 4-Employee

## 2022-12-08 NOTE — PROGRESS NOTES
"Subjective:     José Luis Blackburn is a 47 y.o. female who presents for Knee Pain (NEW / DOI:12.06.22/KNEES/ [facility] ) and Neck Pain      Today's date: 12/8/2022.  Visit #1.  Date of injury: 12/6/2022  CC: Right knee pain and neck pain  Mechanism injury: Patient was seated at her desk and while standing up her foot got caught inside the cords under the desk, she subsequently fell to the ground landing on her right knee.  She has developed some swelling of the knee but no significant bruising.  Says she has localized tenderness to palpation over the patella but no additional aggravating factors.  No discomfort with weightbearing. No instability or mechanical symptoms.  She tried taking Tylenol which has provided limited relief of symptoms.  No history of additional trauma or surgery to her right knee.     Following the fall the patient said her neck was somewhat sore but has gotten progressively worse over the last 24 hours.  Says symptoms are aggravated with looking down and gentle range of motion.  Tylenol is provided limited relief of symptoms.  Denies any radicular symptoms into bilateral upper extremities.  No history of similar symptoms or history of C-spine surgery.      PMH:   No pertinent past medical history to this problem  MEDS:  Medications were reviewed in EMR  ALLERGIES:  Allergies were reviewed in EMR  FH:   No pertinent family history to this problem       Objective:     BP (!) 140/94 (BP Location: Right arm, Patient Position: Sitting, BP Cuff Size: Large adult)   Pulse 71   Temp 36.7 °C (98 °F) (Temporal)   Resp 16   Ht 1.753 m (5' 9\")   SpO2 97%   BMI 38.45 kg/m²     Gen: no acute distress, normal voice  Skin: dry, intact, moist mucosal membranes  Head: Atraumatic, normocephalic  Psych: normal affect, normal judgement, alert, awake  Musculoskeletal: Right knee: Presence of edema.  No erythema or ecchymosis.  Possible effusion but hard to discern due to wearing jeans and " habitus.  Mild tenderness palpation along the medial joint line and patella tendon.  Negative anterior/posterior drawer test.  Negative Lachman test.  No pain or laxity with varus or valgus stress.    C-spine: No erythema, ecchymosis or edema.  Loss of 5 degrees of extension and flexion secondary to mild discernible discomfort.  Full rotation.  No midline tenderness to palpation, step-off or crepitus.  Mild tenderness palpation along the upper margins of the left trapezius muscle.  Distal sensation and motor fully intact C5-C8 bilaterally.      RADIOLOGY RESULTS  DX-KNEE COMPLETE 4+ RIGHT    Result Date: 12/8/2022 12/8/2022 2:42 PM HISTORY/REASON FOR EXAM:  Pain/Deformity Following Trauma.  RIGHT anterior knee pain, ground-level fall several days ago. TECHNIQUE/EXAM DESCRIPTION AND NUMBER OF VIEWS:  4 views of the RIGHT knee. COMPARISON: None FINDINGS: No focal soft tissue swelling. No gross joint effusion. Joint spaces are preserved. No fracture or dislocation.     No fracture or dislocation of RIGHT knee.                  Assessment/Plan:       1. Acute pain of right knee  - DX-KNEE COMPLETE 4+ RIGHT; Future    2. Strain of neck muscle, initial encounter    3. Work related injury    Released to Full Duty FROM 12/8/2022 TO 12/15/2022  Symptoms should be self-limiting.  X-rays were negative for any acute osseous abnormalities.  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  Recommended range of motion and light activity as tolerated, heat for the neck, ice for the knee and Tylenol as needed for symptomatic relief.  Follow-up in urgent care in 1 week for reevaluation.       Differential diagnosis, natural history, supportive care, and indications for immediate follow-up discussed.

## 2022-12-08 NOTE — LETTER
"EMPLOYEE’S CLAIM FOR COMPENSATION/ REPORT OF INITIAL TREATMENT  FORM C-4    EMPLOYEE’S CLAIM - PROVIDE ALL INFORMATION REQUESTED   First Name  José Luis Marinelli Last Name  Bere Birthdate                    1975                Sex  female Claim Number (Insurer’s Use Only)    Home Address  [address]  Age  47 y.o. Height  1.753 m (5' 9\") Weight   SSN     [facility] Zip  [address] Telephone  [phone] (home)    Mailing Address  [address]  [facility] Zip  [address] Primary Language Spoken  English    Insurer   Third-Party   Misc Workers Comp   Employee's Occupation (Job Title) When Injury or Occupational Disease Occurred      Employer's Name/Company Name  [employer]  Telephone  [phone]    Office Mail Address (Number and Street)   [address]  [facility]  Zip  [address]    Date of Injury  12/6/2022               Hours Injury  2:15 PM Date Employer Notified  12/6/2022 Last Day of Work after Injury     or Occupational Disease  12/8/2022 Supervisor to Whom Injury     Reported  Janine Solano   Address or Location of Accident (if applicable)  Work [1]   What were you doing at the time of accident? (if applicable)  getting up from chair    How did this injury or occupational disease occur? (Be specific an answer in detail. Use additional sheet if necessary)  I was getting up from chair at my desk and my foot was caught on wire under desk and I fell forward   If you believe that you have an occupational disease, when did you first have knowledge of the disability and it relationship to your employment?  n/a Witnesses to the Accident  Asia Dotson      Nature of Injury or Occupational Disease  Sprain  Part(s) of Body Injured or Affected  Soft Tissue - Neck, Knee (R), Knee (L)    I certify that the above is true and correct to the best of my knowledge and that " I have provided this information in order to obtain the benefits of [place]  I hereby authorize any physician, chiropractor, surgeon, practitioner, or other person, any hospital, including [facility] or [facility] hospital, any medical service organization, any insurance company, or other institution or organization to release to each other, any medical or other information, including benefits paid or payable, pertinent to this injury or disease, except information relative to diagnosis, treatment and/or counseling for AIDS, psychological conditions, alcohol or controlled substances, for which I must give specific authorization.  A Photostat of this authorization shall be as valid as the original.     Date   Place Employee’s Original or  *Electronic Signature   THIS REPORT MUST BE COMPLETED AND MAILED WITHIN 3 WORKING DAYS OF TREATMENT   Place  [facility]  Name of Facility  [place]   Date  12/8/2022 Diagnosis and Description of Injury or Occupational Disease  (M25.561) Acute pain of right knee  (S16.1XXA) Strain of neck muscle, initial encounter  (Y99.0) Work related injury Is there evidence the injured employee was under the influence of alcohol and/or another controlled substance at the time of accident?  ? No ? Yes (if yes, please explain)    Hour  2:21 PM   Diagnoses of Acute pain of right knee, Strain of neck muscle, initial encounter, and Work related injury were pertinent to this visit. No   Treatment  Symptoms should be self-limiting.  X-rays were negative for any acute osseous abnormalities.  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  Recommended range of motion and light activity as tolerated, heat for the neck, ice for the knee and Tylenol as needed for symptomatic relief.  Follow-up in urgent care in 1 week for reevaluation.    Have you advised the  "patient to remain off work five days or     more?    X-Ray Findings      ? Yes Indicate dates:   From   To      From information given by the employee, together with medical evidence, can        you directly connect this injury or occupational disease as job incurred?  Yes ? No If no, is the injured employee capable of:  ? full duty  Yes ? modified duty      Is additional medical care by a physician indicated?  Yes If Modified Duty, Specify any Limitations / Restrictions      Do you know of any previous injury or disease contributing to this condition or occupational disease?  ? Yes ? No (Explain if yes)                          No   Date  12/8/2022 Print Health Care Provider's   Reggie Cruz D.O. I certify the employer’s copy of  this form was mailed on:   Address  [address] [place] [address]. [address] Insurer’s Use Only     [facility] Zip  [address]    Provider’s Tax ID Number  [id number] Telephone  Dept: [phone]             Health Care Provider’s Original or Electronic Signature  e-REGGIE Farrell D.O. Degree (MD,DO, DC,PA-C,APRN)   DO      * Complete and attach Release of Information (Form C-4A) when injured employee signs C-4 Form electronically  ORIGINAL - TREATING HEALTHCARE PROVIDER PAGE 2 - INSURER/TPA PAGE 3 - EMPLOYER PAGE 4 - EMPLOYEE             Form C-4 (rev.08/21)           BRIEF DESCRIPTION OF RIGHTS AND BENEFITS  (Pursuant to [place] 616C.050)    Notice of Injury or Occupational Disease (Incident Report Form C-1): If an injury or occupational disease (OD) arises out of and in the course of employment, you must provide written notice to your employer as soon as practicable, but no later than 7 days after the accident or OD. Your employer shall maintain a sufficient supply of the required forms.    Claim for Compensation (Form C-4): If medical treatment is sought, the form C-4 is available at the place of initial treatment. A completed \"Claim for Compensation\" (Form C-4) must be filed " within 90 days after an accident or OD. The treating physician or chiropractor must, within 3 working days after treatment, complete and mail to the employer, the employer's insurer and third-party , the Claim for Compensation.    Medical Treatment: If you require medical treatment for your on-the-job injury or OD, you may be required to select a physician or chiropractor from a list provided by your workers’ compensation insurer, if it has contracted with an Organization for Managed Care (MCO) or Preferred Provider Organization (PPO) or providers of health care. If your employer has not entered into a contract with an MCO or PPO, you may select a physician or chiropractor from the Panel of Physicians and Chiropractors. Any medical costs related to your industrial injury or OD will be paid by your insurer.    Temporary Total Disability (TTD): If your doctor has certified that you are unable to work for a period of at least 5 consecutive days, or 5 cumulative days in a 20-day period, or places restrictions on you that your employer does not accommodate, you may be entitled to TTD compensation.    Temporary Partial Disability (TPD): If the wage you receive upon reemployment is less than the compensation for TTD to which you are entitled, the insurer may be required to pay you TPD compensation to make up the difference. TPD can only be paid for a maximum of 24 months.    Permanent Partial Disability (PPD): When your medical condition is stable and there is an indication of a PPD as a result of your injury or OD, within 30 days, your insurer must arrange for an evaluation by a rating physician or chiropractor to determine the degree of your PPD. The amount of your PPD award depends on the date of injury, the results of the PPD evaluation, your age and wage.    Permanent Total Disability (PTD): If you are medically certified by a treating physician or chiropractor as permanently and totally disabled and have  been granted a PTD status by your insurer, you are entitled to receive monthly benefits not to exceed 66 2/3% of your average monthly wage. The amount of your PTD payments is subject to reduction if you previously received a lump-sum PPD award.    Vocational Rehabilitation Services: You may be eligible for vocational rehabilitation services if you are unable to return to the job due to a permanent physical impairment or permanent restrictions as a result of your injury or occupational disease.    Transportation and Per Marlo Reimbursement: You may be eligible for travel expenses and per marlo associated with medical treatment.    Reopening: You may be able to reopen your claim if your condition worsens after claim closure.     Appeal Process: If you disagree with a written determination issued by the insurer or the insurer does not respond to your request, you may appeal to the Department of Administration, , by following the instructions contained in your determination letter. You must appeal the determination within 70 days from the date of the determination letter at [address], [address], [place] [address], or [address], [address], [place] [address]. If you disagree with the  decision, you may appeal to the Department of Administration, . You must file your appeal within 30 days from the date of the  decision letter at [address], [address], [place], [place] [address], or [address], [address], [place], [place] [address]. If you disagree with a decision of an , you may file a petition for judicial review with the District Court. You must do so within 30 days of the Appeal Officer’s decision. You may be represented by an  at your own expense or you may contact the [facility] for possible representation.    [place]  for Injured Workers ([place]IW): If you disagree with a hearing  officer decision, you may request that [facility] represent you without charge at an  Hearing. For information regarding denial of benefits, you may contact the [facility] at: [address], [address], [place] [address], [phone], or [address], [place] [address], [phone]    To File a Complaint with the Division: If you wish to file a complaint with the  of the Division of Industrial Relations (DIR),  please contact the Workers’ Compensation Section, [address], [address], [place], [place] [address], telephone [phone], or [address], [address], [place], [place] [address], telephone [phone].    For assistance with Workers’ Compensation Issues: You may contact the [facility] Office for Consumer Health Assistance, [address], [address], [place], [place] [address], Toll Free 1-114.779.4263, Web site: http://[facility].[place].gov/Programs/MANUEL E-mail: [email or web address].[email or web address].[facility]              __________________________________________________________________                                    _________________            Employee Name / Signature                                                                                                                            Date                                                                                                                                                                                                                              D-2 (rev. 10/20)

## 2022-12-15 ENCOUNTER — OCCUPATIONAL MEDICINE (OUTPATIENT)
Dept: URGENT CARE | Facility: PHYSICIAN GROUP | Age: 47
End: 2022-12-15
Payer: OTHER MISCELLANEOUS

## 2022-12-15 VITALS
TEMPERATURE: 97.6 F | SYSTOLIC BLOOD PRESSURE: 102 MMHG | OXYGEN SATURATION: 97 % | HEART RATE: 68 BPM | RESPIRATION RATE: 16 BRPM | WEIGHT: 211 LBS | BODY MASS INDEX: 31.25 KG/M2 | HEIGHT: 69 IN | DIASTOLIC BLOOD PRESSURE: 74 MMHG

## 2022-12-15 DIAGNOSIS — S16.1XXD STRAIN OF NECK MUSCLE, SUBSEQUENT ENCOUNTER: ICD-10-CM

## 2022-12-15 DIAGNOSIS — S80.01XD CONTUSION OF RIGHT KNEE, SUBSEQUENT ENCOUNTER: ICD-10-CM

## 2022-12-15 PROCEDURE — 99213 OFFICE O/P EST LOW 20 MIN: CPT | Performed by: NURSE PRACTITIONER

## 2022-12-15 RX ORDER — LORAZEPAM 1 MG/1
1 TABLET ORAL 2 TIMES DAILY PRN
COMMUNITY
Start: 2022-10-08

## 2022-12-15 ASSESSMENT — ENCOUNTER SYMPTOMS
WEAKNESS: 0
CONSTITUTIONAL NEGATIVE: 1
GASTROINTESTINAL NEGATIVE: 1
NEUROLOGICAL NEGATIVE: 1
NECK PAIN: 1
SENSORY CHANGE: 0

## 2022-12-15 ASSESSMENT — VISUAL ACUITY: OU: 1

## 2022-12-15 ASSESSMENT — FIBROSIS 4 INDEX: FIB4 SCORE: 1.15

## 2022-12-15 NOTE — PROGRESS NOTES
"Subjective:     José Luis Blackburn is a 47 y.o. female who presents for Follow-Up (WC follow up, slip and fall. PT states she has improved. )    Initial  visit 12/8/2022 reviewed for continuity of care:    \"Visit #1.  Date of injury: 12/6/2022  CC: Right knee pain and neck pain  Mechanism injury: Patient was seated at her desk and while standing up her foot got caught inside the cords under the desk, she subsequently fell to the ground landing on her right knee.  She has developed some swelling of the knee but no significant bruising.  Says she has localized tenderness to palpation over the patella but no additional aggravating factors.  No discomfort with weightbearing. No instability or mechanical symptoms.  She tried taking Tylenol which has provided limited relief of symptoms.  No history of additional trauma or surgery to her right knee.    Following the fall the patient said her neck was somewhat sore but has gotten progressively worse over the last 24 hours.  Says symptoms are aggravated with looking down and gentle range of motion.  Tylenol is provided limited relief of symptoms.  Denies any radicular symptoms into bilateral upper extremities.  No history of similar symptoms or history of C-spine surgery.\"    XR of right knee negative for acute findings. Dx: knee pain, neck strain. Tx: restrictions, RICE, OTC analgesics.    Follow-up  visit today 12/15/2022:    Symptoms improving overall. Right knee pain improving. Walking fine. No bruising. Right neck/shoulder pain improving. Doing stretches. Has been off work on vacation. No other/new symptoms.    Review of Systems   Constitutional: Negative.    Gastrointestinal: Negative.    Genitourinary: Negative.    Musculoskeletal:  Positive for neck pain.        R knee pain   Neurological: Negative.  Negative for sensory change and weakness.   All other systems reviewed and are negative.    Refer to HPI for additional details.    During this visit, " "appropriate PPE was worn, hand hygiene was performed, and the patient and any visitors were masked.    PMH: No pertinent past medical history to this problem  MEDS: Medications were reviewed in Epic  ALLERGIES: Allergies were reviewed in Epic  FH: No pertinent family history to this problem      Objective:     /74   Pulse 68   Temp 36.4 °C (97.6 °F) (Temporal)   Resp 16   Ht 1.753 m (5' 9\")   Wt 95.7 kg (211 lb)   SpO2 97%   BMI 31.16 kg/m²     Physical Exam  Nursing note reviewed.   Constitutional:       General: She is not in acute distress.     Appearance: She is well-developed. She is not ill-appearing or toxic-appearing.   Eyes:      General: Vision grossly intact.   Cardiovascular:      Rate and Rhythm: Normal rate.   Pulmonary:      Effort: Pulmonary effort is normal. No respiratory distress.   Musculoskeletal:         General: No deformity. Normal range of motion.      Cervical back: No rigidity. No spinous process tenderness or muscular tenderness. Normal range of motion.      Right knee: No swelling or deformity. Normal range of motion. No tenderness. No LCL laxity, MCL laxity, ACL laxity or PCL laxity. Normal alignment and normal patellar mobility.      Left knee: No swelling.   Skin:     General: Skin is warm and dry.      Coloration: Skin is not pale.   Neurological:      Mental Status: She is alert and oriented to person, place, and time.      Motor: No weakness.      Gait: Gait normal.   Psychiatric:         Behavior: Behavior normal. Behavior is cooperative.     Tight-fitting jeans.      Assessment/Plan:     1. Contusion of right knee, subsequent encounter    2. Strain of neck muscle, subsequent encounter    Condition improving overall. Continue previous therapy. Continue work restrictions. Follow up in 1 week. Seek immediate medical attention if symptoms change/worsen.    Differential diagnosis, natural history, supportive care, over-the-counter symptom management per 's " instructions, close monitoring, and indications for immediate follow-up discussed.     All questions answered. Patient agrees with the plan of care.

## 2022-12-15 NOTE — LETTER
"   [facility] [address] - [place] [address]  Phone:  [phone] - Fax:  [phone]   [facility] Progress Report and Disability Certification  Date of Service: 12/15/2022   No Show:  No  Date / Time of Next Visit: 12/22/2022   Claim Information   Patient Name: José Luis Blackburn  Claim Number:     Employer: [employer]  Date of Injury: 12/6/2022     Insurer / TPA: Misc Workers Comp  ID / SSN:     Occupation:   Diagnosis: Diagnoses of Contusion of right knee, subsequent encounter and Strain of neck muscle, subsequent encounter were pertinent to this visit.    Medical Information   Related to Industrial Injury? Yes    Subjective Complaints:  Initial  visit 12/8/2022 reviewed for continuity of care:    \"Visit #1.  Date of injury: 12/6/2022  CC: Right knee pain and neck pain  Mechanism injury: Patient was seated at her desk and while standing up her foot got caught inside the cords under the desk, she subsequently fell to the ground landing on her right knee.  She has developed some swelling of the knee but no significant bruising.  Says she has localized tenderness to palpation over the patella but no additional aggravating factors.  No discomfort with weightbearing. No instability or mechanical symptoms.  She tried taking Tylenol which has provided limited relief of symptoms.  No history of additional trauma or surgery to her right knee.    Following the fall the patient said her neck was somewhat sore but has gotten progressively worse over the last 24 hours.  Says symptoms are aggravated with looking down and gentle range of motion.  Tylenol is provided limited relief of symptoms.  Denies any radicular symptoms into bilateral upper extremities.  No history of similar symptoms or history of C-spine surgery.\"    XR of right knee negative for acute findings. Dx: knee pain, neck strain. Tx: donal, RICE, OTC " analgesics.    Follow-up  visit today 12/15/2022:    Symptoms improving overall. Right knee pain improving. Walking fine. No bruising. Right neck/shoulder pain improving. Doing stretches. Has been off work on vacation. No other/new symptoms.   Objective Findings: Constitutional:       General: She is not in acute distress.     Appearance: She is well-developed. She is not ill-appearing or toxic-appearing.   Musculoskeletal:         General: No deformity. Normal range of motion.      Cervical back: No rigidity. No spinous process tenderness or muscular tenderness. Normal range of motion.      Right knee: No swelling or deformity. Normal range of motion. No tenderness. No LCL laxity, MCL laxity, ACL laxity or PCL laxity. Normal alignment and normal patellar mobility.      Left knee: No swelling.   Skin:     General: Skin is warm and dry.      Coloration: Skin is not pale.   Neurological:      Mental Status: She is alert and oriented to person, place, and time.      Motor: No weakness.      Gait: Gait normal.    Pre-Existing Condition(s):     Assessment:   Condition Improved    Status: Additional Care Required  Permanent Disability:No    Plan:      Diagnostics:      Comments:  Condition improving overall. Continue previous therapy. Continue work restrictions. Follow up in 1 week. Seek immediate medical attention if symptoms change/worsen.    Disability Information   Status: Released to Restricted Duty    From:  12/15/2022  Through: 12/22/2022 Restrictions are: Temporary   Physical Restrictions   Sitting:    Standing:    Stooping:    Bending:      Squatting:    Walking:    Climbing:    Pushing:      Pulling:    Other:    Reaching Above Shoulder (L):   Reaching Above Shoulder (R):       Reaching Below Shoulder (L):    Reaching Below Shoulder (R):      Not to exceed Weight Limits   Carrying(hrs):   Weight Limit(lb):   Lifting(hrs):   Weight  Limit(lb):     Comments: Regular desk duties OK    Repetitive Actions   Hands:  i.e. Fine Manipulations from Grasping:     Feet: i.e. Operating Foot Controls:     Driving / Operate Machinery:     Health Care Provider’s Original or Electronic Signature  RADHA Salazar Health Care Provider’s Original or Electronic Signature    Wolfgang Stacy DO MPH     Clinic Name / Location: [address]  [place] [address] Clinic Phone Number: Dept: [phone]   Appointment Time: 2:00 Pm Visit Start Time: 3:14 PM   Check-In Time:  1:35 Pm Visit Discharge Time:  4:15PM    Original-Treating Physician or Chiropractor    Page 2-Insurer/TPA    Page 3-Employer    Page 4-Employee

## 2022-12-22 ENCOUNTER — OCCUPATIONAL MEDICINE (OUTPATIENT)
Dept: URGENT CARE | Facility: PHYSICIAN GROUP | Age: 47
End: 2022-12-22
Payer: OTHER MISCELLANEOUS

## 2022-12-22 VITALS
HEIGHT: 69 IN | WEIGHT: 222.2 LBS | SYSTOLIC BLOOD PRESSURE: 118 MMHG | TEMPERATURE: 97.6 F | DIASTOLIC BLOOD PRESSURE: 82 MMHG | OXYGEN SATURATION: 98 % | BODY MASS INDEX: 32.91 KG/M2 | HEART RATE: 61 BPM | RESPIRATION RATE: 14 BRPM

## 2022-12-22 DIAGNOSIS — S80.01XD CONTUSION OF RIGHT KNEE, SUBSEQUENT ENCOUNTER: ICD-10-CM

## 2022-12-22 DIAGNOSIS — S16.1XXD STRAIN OF NECK MUSCLE, SUBSEQUENT ENCOUNTER: ICD-10-CM

## 2022-12-22 PROCEDURE — 99213 OFFICE O/P EST LOW 20 MIN: CPT | Performed by: PHYSICIAN ASSISTANT

## 2022-12-22 ASSESSMENT — FIBROSIS 4 INDEX: FIB4 SCORE: 1.15

## 2022-12-22 NOTE — LETTER
[facility]  [address] - [place] [address]  Phone:  [phone] - Fax:  [phone]   [facility] Progress Report and Disability Certification  Date of Service: 12/22/2022   No Show:  No  Date / Time of Next Visit:     Claim Information   Patient Name: José Luis Blackburn  Claim Number:     Employer: [employer]  Date of Injury: 12/6/2022     Insurer / TPA: Misc Workers Comp  ID / SSN:     Occupation:   Diagnosis: Diagnoses of Contusion of right knee, subsequent encounter and Strain of neck muscle, subsequent encounter were pertinent to this visit.    Medical Information   Related to Industrial Injury? Yes    Subjective Complaints:  Date of injury 12/6/2022: Patient presents after trip and fall at work, today is his third urgent care visit and she reports that her knee has improved significantly and has not continued to be swollen or painful.  Her neck has also improved significantly and has improved with stretching and anti-inflammatories.  She coincidentally had this time off of work for planned vacation and so has had no issues with current work restrictions.  She returns to work in around 6 days and feels capable of returning to full duty without restrictions at this time.  As before she has no second job and no contributory comorbidities   Objective Findings: Alert nontoxic female in no acute distress.  Nontender palpation over the bony prominences of the cervical spine.  Full range of motion of the cervical spine.  Mild right-sided cervical paraspinal muscle spasm and tenderness.  Nontender over right knee, ambulatory with a steady station and gait.  Full range of motion of the lower extremities, neurovascularly intact   Pre-Existing Condition(s):     Assessment:   Condition Improved    Status: Discharged /  MMI  Permanent Disability:No    Plan:      Diagnostics:      Comments:       Disability Information    Status: Released to Full Duty    From:     Through:   Restrictions are:     Physical Restrictions   Sitting:    Standing:    Stooping:    Bending:      Squatting:    Walking:    Climbing:    Pushing:      Pulling:    Other:    Reaching Above Shoulder (L):   Reaching Above Shoulder (R):       Reaching Below Shoulder (L):    Reaching Below Shoulder (R):      Not to exceed Weight Limits   Carrying(hrs):   Weight Limit(lb):   Lifting(hrs):   Weight  Limit(lb):     Comments:      Repetitive Actions   Hands: i.e. Fine Manipulations from Grasping:     Feet: i.e. Operating Foot Controls:     Driving / Operate Machinery:     Health Care Provider’s Original or Electronic Signature  Leland Cardoso P.A.-C. Health Care Provider’s Original or Electronic Signature    Wolfgang Stacy DO MPH     Clinic Name / Location: [address]  [place] [address] Clinic Phone Number: Dept: [phone]   Appointment Time: 9:00 Am Visit Start Time: 8:59 AM   Check-In Time:  8:50 Am Visit Discharge Time:  9:30AM    Original-Treating Physician or Chiropractor    Page 2-Insurer/TPA    Page 3-Employer    Page 4-Employee

## 2022-12-22 NOTE — PROGRESS NOTES
"Subjective:     José Luis Blackburn is a 47 y.o. female who presents for Follow-Up (W/C follow injury with neck improving)      Date of injury 12/6/2022: Patient presents after trip and fall at work, today is his third urgent care visit and she reports that her knee has improved significantly and has not continued to be swollen or painful.  Her neck has also improved significantly and has improved with stretching and anti-inflammatories.  She coincidentally had this time off of work for planned vacation and so has had no issues with current work restrictions.  She returns to work in around 6 days and feels capable of returning to full duty without restrictions at this time.  As before she has no second job and no contributory comorbidities    PMH:   No pertinent past medical history to this problem  MEDS:  Medications were reviewed in EMR  ALLERGIES:  Allergies were reviewed in EMR  [employer]:  Works as a contact analyst  FH:   No pertinent family history to this problem       Objective:     /82 (BP Location: Right arm, Patient Position: Sitting, BP Cuff Size: Large adult)   Pulse 61   Temp 36.4 °C (97.6 °F) (Temporal)   Resp 14   Ht 1.753 m (5' 9\")   Wt 101 kg (222 lb 3.2 oz)   SpO2 98%   BMI 32.81 kg/m²     Alert nontoxic female in no acute distress.  Nontender palpation over the bony prominences of the cervical spine.  Full range of motion of the cervical spine.  Mild right-sided cervical paraspinal muscle spasm and tenderness.  Nontender over right knee, ambulatory with a steady station and gait.  Full range of motion of the lower extremities, neurovascularly intact    Assessment/Plan:       1. Contusion of right knee, subsequent encounter    2. Strain of neck muscle, subsequent encounter    Released to Full Duty FROM   TO            Differential diagnosis, natural history, supportive care, and indications for immediate follow-up discussed.    "

## 2023-05-03 NOTE — ED NOTES
Patient : Freida Martinez Age: 9 month old Sex: female   MRN: [id number] Encounter Date: 5/2/2023    History     Chief Complaint   Patient presents with   • Vomiting   • Constipation       HPI    Freida Martinez is a 9 month old presenting to the emergency department with one week of congestion with cough and vomiting. Mom states she has been more tired than usual. She has had the same number of wet diapers but has not had a bowel movement since Sunday. Patient in no apparent distress.  She is up to date on immunizations. She has been able to tolerate PO fluid while here in the ED        Allergies   Allergen Reactions   • Lactose   (Food Or Med) Other (See Comments)       No current facility-administered medications for this encounter.     No current outpatient medications on file.       History reviewed. No pertinent past medical history.    History reviewed. No pertinent surgical history.    No family history on file.             Physical Exam     ED Triage Vitals   ED Triage Vitals Group      Temp 05/02/23 2133 99.3 °F (37.4 °C)      Heart Rate 05/02/23 2046 155      Resp 05/02/23 2046 (!) 26      BP --       SpO2 05/02/23 2046 99 %      EtCO2 mmHg --       Height --       Weight 05/02/23 2046 22 lb 4.3 oz (10.1 kg)      Weight Scale Used 05/02/23 2046 Infant scale      BMI (Calculated) --       IBW/kg (Calculated) --        Physical Exam  Vitals reviewed.   Constitutional:       General: She is active. She is not in acute distress.     Appearance: Normal appearance. She is well-developed. She is not toxic-appearing.   HENT:      Head: Normocephalic and atraumatic.      Right Ear: Tympanic membrane normal.      Left Ear: Tympanic membrane normal.      Nose: Rhinorrhea present.      Mouth/Throat:      Mouth: Mucous membranes are moist.      Pharynx: Oropharynx is clear. No oropharyngeal exudate or posterior oropharyngeal erythema.      Neck: Normal range of motion and neck supple.   Eyes:      Conjunctiva/sclera:  Patient medicated per MAR   Conjunctivae normal.   Cardiovascular:      Rate and Rhythm: Normal rate and regular rhythm.      Pulses: Normal pulses.   Pulmonary:      Effort: Pulmonary effort is normal.      Breath sounds: Normal breath sounds.   Abdominal:      General: Abdomen is flat. Bowel sounds are normal.      Palpations: Abdomen is soft.   Musculoskeletal:         General: Normal range of motion.   Skin:     General: Skin is warm and dry.      Capillary Refill: Capillary refill takes less than 2 seconds.      Turgor: Normal.   Neurological:      General: No focal deficit present.      Mental Status: She is alert.             Lab Results     Results for orders placed or performed during the hospital encounter of 05/02/23   COVID/Flu/RSV panel   Result Value Ref Range    Rapid SARS-COV-2 by PCR Not Detected Not Detected / Detected / Presumptive Positive / Inhibitors present    Influenza A by PCR Not Detected Not Detected    Influenza B by PCR Not Detected Not Detected    RSV BY PCR Not Detected Not Detected    Isolation Guidelines      Procedural Comment         EKG     No EKG performed    Radiology Results     Imaging Results    None         ED Medications     ED Medication Orders (From admission, onward)    None          ED Course     Vitals:    05/02/23 2046 05/02/23 2133   Pulse: 155 154   Resp: (!) 26 31   Temp:  99.3 °F (37.4 °C)   SpO2: 99% 100%   Weight: 10.1 kg (22 lb 4.3 oz)               MDM                           Patient presents with symptoms and exam consistent with a viral syndrome. Although considered in the differential diagnosis, this well hydrated, non-toxic, vaccinated child with has no evidence of sepsis, serious bacterial disease, pneumonia, or other significant concerns. The child is breathing comfortably, is in no respiratory distress, and is without retractions. Patient is appropriate for outpatient management with anti-pyretics and supportive care. Parent is comfortable with plan. Patient is stable for  discharge home. Patient to follow up with pediatrician in 2 days. Strict return to ED precautions given to parent. ACI discussed with parent; see instruction below. Parent verbalized understanding.    MDM done in ED Course    Does the Patient have sepsis: NO     Critical Care       No Critical Care        Disposition       Clinical Impression and Diagnosis  11:25 PM       ED Diagnosis     Diagnosis Comment Associated Orders       Final diagnosis    Viral illness -- --          Follow Up:  Pcp, No      Follow up tomorrow as scheduled    Pcp, No                Summary of your Discharge Medications      You have not been prescribed any medications.         Pt is discharged to home/self care in stable condition.                Discharge 5/2/2023 11:34 PM  Freida Martinez discharge to home/self care.                       Jessica Massey PA-C  05/02/23 2339       Jessica Massey PA-C  05/02/23 2334

## 2023-09-19 ENCOUNTER — OFFICE VISIT (OUTPATIENT)
Dept: PHYSICAL MEDICINE AND REHAB | Facility: MEDICAL CENTER | Age: 48
End: 2023-09-19
Payer: COMMERCIAL

## 2023-09-19 VITALS
TEMPERATURE: 97.1 F | BODY MASS INDEX: 35.13 KG/M2 | WEIGHT: 237.22 LBS | DIASTOLIC BLOOD PRESSURE: 86 MMHG | HEIGHT: 69 IN | OXYGEN SATURATION: 99 % | SYSTOLIC BLOOD PRESSURE: 132 MMHG | HEART RATE: 75 BPM

## 2023-09-19 DIAGNOSIS — M54.16 LUMBAR RADICULOPATHY: ICD-10-CM

## 2023-09-19 DIAGNOSIS — M25.552 CHRONIC LEFT HIP PAIN: ICD-10-CM

## 2023-09-19 DIAGNOSIS — Z71.3 DIETARY COUNSELING: ICD-10-CM

## 2023-09-19 DIAGNOSIS — G89.29 CHRONIC LEFT-SIDED LOW BACK PAIN WITH LEFT-SIDED SCIATICA: ICD-10-CM

## 2023-09-19 DIAGNOSIS — M54.42 CHRONIC LEFT-SIDED LOW BACK PAIN WITH LEFT-SIDED SCIATICA: ICD-10-CM

## 2023-09-19 DIAGNOSIS — M25.852 LEFT HIP IMPINGEMENT SYNDROME: ICD-10-CM

## 2023-09-19 DIAGNOSIS — Z71.82 EXERCISE COUNSELING: ICD-10-CM

## 2023-09-19 DIAGNOSIS — G89.29 CHRONIC LEFT HIP PAIN: ICD-10-CM

## 2023-09-19 DIAGNOSIS — E66.9 OBESITY (BMI 30-39.9): ICD-10-CM

## 2023-09-19 DIAGNOSIS — M16.10 ARTHRITIS OF HIP: ICD-10-CM

## 2023-09-19 PROCEDURE — 99214 OFFICE O/P EST MOD 30 MIN: CPT | Performed by: PHYSICAL MEDICINE & REHABILITATION

## 2023-09-19 PROCEDURE — 3075F SYST BP GE 130 - 139MM HG: CPT | Performed by: PHYSICAL MEDICINE & REHABILITATION

## 2023-09-19 PROCEDURE — 1125F AMNT PAIN NOTED PAIN PRSNT: CPT | Performed by: PHYSICAL MEDICINE & REHABILITATION

## 2023-09-19 PROCEDURE — 3079F DIAST BP 80-89 MM HG: CPT | Performed by: PHYSICAL MEDICINE & REHABILITATION

## 2023-09-19 RX ORDER — CYCLOBENZAPRINE HCL 10 MG
10 TABLET ORAL 3 TIMES DAILY PRN
Qty: 90 TABLET | Refills: 3 | Status: SHIPPED | OUTPATIENT
Start: 2023-09-19

## 2023-09-19 ASSESSMENT — PAIN SCALES - GENERAL: PAINLEVEL: 4=SLIGHT-MODERATE PAIN

## 2023-09-19 ASSESSMENT — PATIENT HEALTH QUESTIONNAIRE - PHQ9
CLINICAL INTERPRETATION OF PHQ2 SCORE: 1
5. POOR APPETITE OR OVEREATING: 0 - NOT AT ALL
SUM OF ALL RESPONSES TO PHQ QUESTIONS 1-9: 7

## 2023-09-19 ASSESSMENT — FIBROSIS 4 INDEX: FIB4 SCORE: 1.15

## 2023-09-19 NOTE — H&P (VIEW-ONLY)
Follow up patient note  Interventional spine and sports physiatry, Physical medicine rehabilitation      Chief complaint:   Chief Complaint   Patient presents with    Follow-Up     Pain starting in tailbone, wrapping to left lower leg          HISTORY    Please see new patient note by Dr Mojica,  for more details.     HPI  Patient identification: José Luis Blackburn ,  1975,   With Diagnoses of Chronic left hip pain, Left hip impingement syndrome, Arthritis of hip, Chronic left-sided low back pain with left-sided sciatica, Lumbar radiculopathy, Obesity (BMI 30-39.9), Exercise counseling, and Dietary counseling were pertinent to this visit.     Procedures:  2020 left Lumbar Transforaminal Epidural Steroid  at the L5-S1 and S1 levels 100% improvement in radicular component and 50% improvement in back pain  8/10/2021 left sacroiliac joint injection with 50% pain relief    Medications tried include gabapentin which she cannot take because of cognitive side effects.  NSAIDs including ibuprofen 600 mg.  Tylenol.  Flexeril.    Pain is mostly in the left anterior lateral and posterior hip worse with hip movements reproducing the pain.  8 out of 10 intensity.  Constant.  Worse with hip movements.  Pain can radiate down the posterior lateral aspect of the leg or down the anterior aspect of the leg.  Causing functional deficits including difficulty with ADLs and with lower body dressing.    The patient has had weight loss of over 50 pounds after diet and exercise improvements as well as bariatric surgery.      ROS Red Flags :   Fever, Chills, Sweats: Denies  Involuntary Weight Loss: Denies  Bowel/Bladder Incontinence: Denies  Saddle Anesthesia: Denies        PMHx:   Past Medical History:   Diagnosis Date    Allergy     Anemia     with pregnancy    Anxiety     Anxiety disorder     Arthritis 2021    hips    Bowel habit changes 2021    constipation    Depression     Headache(784.0)     [facility]      pac 2009    and PVCs    PONV (postoperative nausea and vomiting) 1995    I throw up from anesthesia    Sciatica     Seizure (HCC)     Petit mal until puberty    Spinal headache 1999    Spinal headache from epidural due to childbirth    Urinary incontinence 2003    Leak urine from childbirth    Urinary tract infection, site not specified        PSHx:   Past Surgical History:   Procedure Laterality Date    TX LAP, KEMAR RESTRICT PROC, LONGITUDINAL GAS*  12/15/2021    Procedure: GASTRECTOMY, SLEEVE, LAPAROSCOPIC;  Surgeon: Edu Lius M.D.;  Location: SURGERY [facility];  Service: General    TX INJECTION,SACROILIAC JOINT Left 8/10/2021    Procedure: LEFT sacroiliac joint injection with sedation;  Surgeon: Bro Mojica M.D.;  Location: [facility];  Service: Pain Management    LUMBAR TRANSFORAMINAL EPIDURAL STEROID INJECTION Left 11/16/2020    Procedure: INJECTION, STEROID, SPINE, LUMBAR, EPIDURAL, TRANSFORAMINAL APPROACH;  Surgeon: Bro Mojica M.D.;  Location: [facility];  Service: Pain Management    LITHOTRIPSY  2004    OTHER  Lithotripsy wisdom teeth    OTHER      cervical polyp removed       Family history   Family History   Problem Relation Age of Onset    Arthritis Mother         Rheumatoid arthritis    Diabetes Father     Hypertension Father     Hyperlipidemia Father     Hypertension Brother     Hypertension Maternal Grandmother     Cancer Maternal Grandfather         mesothelioma from asbestos    Hypertension Paternal Grandmother     Cancer Paternal Grandfather         bone    Hypertension Paternal Grandfather          Medications:   Outpatient Medications Marked as Taking for the 9/19/23 encounter (Office Visit) with Bro Mojica M.D.   Medication Sig Dispense Refill    cyclobenzaprine (FLEXERIL) 10 mg Tab Take 1 Tablet by mouth 3 times a day as needed for Mild Pain, Moderate Pain or Muscle Spasms. 90 Tablet 3    LORazepam (ATIVAN) 1 MG Tab Take 1 mg by mouth  2 times a day as needed.      docusate sodium (COLACE) 100 MG Cap Take 100 mg by mouth 2 times a day.      albuterol 108 (90 Base) MCG/ACT Aero Soln inhalation aerosol Inhale 2 Puffs every 6 hours as needed for Shortness of Breath. 8.5 g 0    Ascorbic Acid (VITAMIN C) 1000 MG Tab Take  by mouth.      COLLAGEN PO Take  by mouth.      Cholecalciferol (VITAMIN D3 PO) Take 50,000 Units by mouth. Once a week      acetaminophen (TYLENOL) 500 MG Tab Take 500-1,000 mg by mouth every 6 hours as needed.      LORAZEPAM PO Take  by mouth.      ibuprofen (MOTRIN) 600 MG Tab Take 600 mg by mouth every 6 hours as needed.          Current Outpatient Medications on File Prior to Visit   Medication Sig Dispense Refill    LORazepam (ATIVAN) 1 MG Tab Take 1 mg by mouth 2 times a day as needed.      docusate sodium (COLACE) 100 MG Cap Take 100 mg by mouth 2 times a day.      albuterol 108 (90 Base) MCG/ACT Aero Soln inhalation aerosol Inhale 2 Puffs every 6 hours as needed for Shortness of Breath. 8.5 g 0    Ascorbic Acid (VITAMIN C) 1000 MG Tab Take  by mouth.      COLLAGEN PO Take  by mouth.      Cholecalciferol (VITAMIN D3 PO) Take 50,000 Units by mouth. Once a week      acetaminophen (TYLENOL) 500 MG Tab Take 500-1,000 mg by mouth every 6 hours as needed.      LORAZEPAM PO Take  by mouth.      ibuprofen (MOTRIN) 600 MG Tab Take 600 mg by mouth every 6 hours as needed.      promethazine-dextromethorphan (PROMETHAZINE-DM) 6.25-15 MG/5ML syrup Take 5 mL by mouth 4 times a day as needed for Cough. (Patient not taking: Reported on 12/13/2021) 120 mL 0    losartan (COZAAR) 50 MG Tab Take 50 mg by mouth every day. (Patient not taking: Reported on 9/19/2023)      progesterone (PROMETRIUM) 200 MG capsule TAKE 1 CAPSULE BY MOUTH ONCE DAILY FOR 12 DAYS EVERY 3 4 MONTHS (Patient not taking: No sig reported)       No current facility-administered medications on file prior to visit.         Allergies:   Allergies   Allergen Reactions    Contrast  "Media With Iodine [Iodine]      Pt reports becoming SOB with contrast media with iodine in the past    Gabapentin      Cognitive side effects     Other Drug      \"some epilepsy medications, unsure of name\"       Social Hx:   Social History     Socioeconomic History    Marital status:      Spouse name: Not on file    Number of children: Not on file    Years of education: Not on file    Highest education level: Not on file   Occupational History    Not on file   Tobacco Use    Smoking status: Former     Current packs/day: 0.00     Types: Cigarettes     Quit date:      Years since quittin.7    Smokeless tobacco: Never    Tobacco comments:     Socially twice a month now   Vaping Use    Vaping Use: Never used   Substance and Sexual Activity    Alcohol use: No    Drug use: No    Sexual activity: Yes     Partners: Male     Birth control/protection: Rhythm   Other Topics Concern     Service No    Blood Transfusions No    Caffeine Concern No    Occupational Exposure No    Hobby Hazards No    Sleep Concern Yes    Stress Concern No    Weight Concern Yes    Special Diet No    Back Care Yes    Exercise No    Bike Helmet No    Seat Belt Yes    Self-Exams Yes   Social History Narrative    Not on file     Social Determinants of Health     Financial Resource Strain: Not on file   Food Insecurity: Not on file   Transportation Needs: Not on file   Physical Activity: Not on file   Stress: Not on file   Social Connections: Not on file   Intimate Partner Violence: Not on file   Housing Stability: Not on file         EXAMINATION     Physical Exam:   Vitals: /86 (BP Location: Right arm, Patient Position: Sitting, BP Cuff Size: Large adult)   Pulse 75   Temp 36.2 °C (97.1 °F) (Temporal)   Ht 1.753 m (5' 9\")   Wt 108 kg (237 lb 3.4 oz)   SpO2 99%     Constitutional:   Body Habitus: Body mass index is 35.03 kg/m².  Cooperation: Fully cooperates with exam  Appearance: Well-groomed no " "disheveled    Respiratory-  breathing comfortable on room air, no audible wheezing  Cardiovascular- capillary refills less than 2 seconds. No lower extremity edema is noted.   Psychiatric- alert and oriented ×3. Normal affect.    MSK and Neuro: -    FAIRs maneuver and Grant's maneuver positive on the left for reproducing hip pain, buttock pain and back pain.  Thrust, SI joint distraction and compression were negative bilaterally.  No tenderness palpation along the SI joint.  Strength is 5 out of 5 in the bilateral lower extremities with sensation intact.          MEDICAL DECISION MAKING    DATA    Labs:   Lab Results   Component Value Date/Time    SODIUM 142 12/17/2021 03:17 AM    POTASSIUM 4.1 12/17/2021 03:17 AM    CHLORIDE 109 12/17/2021 03:17 AM    CO2 20 12/17/2021 03:17 AM    GLUCOSE 106 (H) 12/17/2021 03:17 AM    BUN 8 12/17/2021 03:17 AM    CREATININE 0.44 (L) 12/17/2021 03:17 AM        Lab Results   Component Value Date/Time    PROTHROMBTM 14.0 12/13/2021 09:44 AM    INR 1.11 12/13/2021 09:44 AM        Lab Results   Component Value Date/Time    WBC 11.1 (H) 12/17/2021 03:17 AM    RBC 4.31 12/17/2021 03:17 AM    HEMOGLOBIN 12.8 12/17/2021 03:17 AM    HEMATOCRIT 38.9 12/17/2021 03:17 AM    MCV 90.3 12/17/2021 03:17 AM    MCH 29.7 12/17/2021 03:17 AM    MCHC 32.9 (L) 12/17/2021 03:17 AM    MPV 10.9 12/17/2021 03:17 AM        No results found for: \"HBA1C\"       Imaging:   I personally reviewed following images  X-ray hips 8/4/2021 with significant prominence of the anterolateral portion of the head neck junction of the bilateral hips consistent with hip impingement syndrome.    I reviewed the fluoroscopic images from 11/16/2020 showing successful left L5-S1 and S1 transforaminal epidural steroid injection.  I reviewed these with the patient on 12/14/2020.    MRI lumbar spine dated 10/16/2020  At L5-S1 there is a large left paracentral disc herniation with impingement on the descending left S1 nerve root.  At " L4-5 there is a small disc protrusion with mild left neuroforaminal stenosis.    I reviewed the following radiology reports    XR hip                 Results for orders placed in visit on 10/16/20   MR-LUMBAR SPINE-W/O                                                                                                                                                                                                      Results for orders placed during the hospital encounter of 20   DX-LUMBAR SPINE-2 OR 3 VIEWS    Impression No significant spondylosis. No acute fracture or listhesis.                                           DIAGNOSIS   Visit Diagnoses     ICD-10-CM   1. Chronic left hip pain  M25.552    G89.29   2. Left hip impingement syndrome  M25.852   3. Arthritis of hip  M16.10   4. Chronic left-sided low back pain with left-sided sciatica  M54.42    G89.29   5. Lumbar radiculopathy  M54.16   6. Obesity (BMI 30-39.9)  E66.9   7. Exercise counseling  Z71.82   8. Dietary counseling  Z71.3           ASSESSMENT and PLAN:     José Luis Marinelli Bere  1975 female      José Luis Marinelli was seen today for follow-up.    Diagnoses and all orders for this visit:    Chronic left hip pain  -     Referral to Physical Medicine Rehab  -     cyclobenzaprine (FLEXERIL) 10 mg Tab; Take 1 Tablet by mouth 3 times a day as needed for Mild Pain, Moderate Pain or Muscle Spasms.    Left hip impingement syndrome  -     Referral to Physical Medicine Rehab  -     cyclobenzaprine (FLEXERIL) 10 mg Tab; Take 1 Tablet by mouth 3 times a day as needed for Mild Pain, Moderate Pain or Muscle Spasms.    Arthritis of hip  -     Referral to Physical Medicine Rehab  -     cyclobenzaprine (FLEXERIL) 10 mg Tab; Take 1 Tablet by mouth 3 times a day as needed for Mild Pain, Moderate Pain or Muscle Spasms.    Chronic left-sided low back pain with left-sided sciatica  -     cyclobenzaprine (FLEXERIL) 10 mg Tab; Take 1 Tablet by mouth 3 times a day as  needed for Mild Pain, Moderate Pain or Muscle Spasms.    Lumbar radiculopathy  -     cyclobenzaprine (FLEXERIL) 10 mg Tab; Take 1 Tablet by mouth 3 times a day as needed for Mild Pain, Moderate Pain or Muscle Spasms.    Obesity (BMI 30-39.9)    Exercise counseling    Dietary counseling        I believe the patient's pain is multifactorial however majority of her pain on exam was with FAIRs maneuver which reproduced hip and low back pain.  I believe the majority the pain is from the left hip joint.    I have ordered diagnostic and therapeutic fluoroscopically guided intra-articular left hip injection.  The patient has needle phobia and we will plan to do this with sedation with Versed only.  No IV pain medications were used during the procedure.    The risks benefits and alternatives to this procedure were discussed and the patient wishes to proceed with the procedure. Risks include but are not limited to damage to surrounding structures, infection, bleeding, worsening of pain which can be permanent, weakness which can be permanent. Benefits include pain relief, improved function. Alternatives includes not doing the procedure.          Follow up: After the above diagnostic and therapeutic procedure    Thank you for allowing me to participate in the care of this patient. If you have any questions please not hesitate to contact me.             Please note that this dictation was created using voice recognition software. I have made every reasonable attempt to correct obvious errors but there may be errors of grammar and content that I may have overlooked prior to finalization of this note.      Bro Mojica MD  Interventional Spine and Sports Physiatry  Physical Medicine and Rehabilitation  [facility]

## 2023-09-19 NOTE — PROGRESS NOTES
Follow up patient note  Interventional spine and sports physiatry, Physical medicine rehabilitation      Chief complaint:   Chief Complaint   Patient presents with    Follow-Up     Pain starting in tailbone, wrapping to left lower leg          HISTORY    Please see new patient note by Dr Mojica,  for more details.     HPI  Patient identification: José Luis Blackburn ,  1975,   With Diagnoses of Chronic left hip pain, Left hip impingement syndrome, Arthritis of hip, Chronic left-sided low back pain with left-sided sciatica, Lumbar radiculopathy, Obesity (BMI 30-39.9), Exercise counseling, and Dietary counseling were pertinent to this visit.     Procedures:  2020 left Lumbar Transforaminal Epidural Steroid  at the L5-S1 and S1 levels 100% improvement in radicular component and 50% improvement in back pain  8/10/2021 left sacroiliac joint injection with 50% pain relief    Medications tried include gabapentin which she cannot take because of cognitive side effects.  NSAIDs including ibuprofen 600 mg.  Tylenol.  Flexeril.    Pain is mostly in the left anterior lateral and posterior hip worse with hip movements reproducing the pain.  8 out of 10 intensity.  Constant.  Worse with hip movements.  Pain can radiate down the posterior lateral aspect of the leg or down the anterior aspect of the leg.  Causing functional deficits including difficulty with ADLs and with lower body dressing.    The patient has had weight loss of over 50 pounds after diet and exercise improvements as well as bariatric surgery.      ROS Red Flags :   Fever, Chills, Sweats: Denies  Involuntary Weight Loss: Denies  Bowel/Bladder Incontinence: Denies  Saddle Anesthesia: Denies        PMHx:   Past Medical History:   Diagnosis Date    Allergy     Anemia     with pregnancy    Anxiety     Anxiety disorder     Arthritis 2021    hips    Bowel habit changes 2021    constipation    Depression     Headache(784.0)     [facility]      pac 2009    and PVCs    PONV (postoperative nausea and vomiting) 1995    I throw up from anesthesia    Sciatica     Seizure (HCC)     Petit mal until puberty    Spinal headache 1999    Spinal headache from epidural due to childbirth    Urinary incontinence 2003    Leak urine from childbirth    Urinary tract infection, site not specified        PSHx:   Past Surgical History:   Procedure Laterality Date    AL LAP, KEMAR RESTRICT PROC, LONGITUDINAL GAS*  12/15/2021    Procedure: GASTRECTOMY, SLEEVE, LAPAROSCOPIC;  Surgeon: Edu Luis M.D.;  Location: SURGERY [facility];  Service: General    AL INJECTION,SACROILIAC JOINT Left 8/10/2021    Procedure: LEFT sacroiliac joint injection with sedation;  Surgeon: Bro Mojica M.D.;  Location: [facility];  Service: Pain Management    LUMBAR TRANSFORAMINAL EPIDURAL STEROID INJECTION Left 11/16/2020    Procedure: INJECTION, STEROID, SPINE, LUMBAR, EPIDURAL, TRANSFORAMINAL APPROACH;  Surgeon: Bro Mojica M.D.;  Location: [facility]ERY [facility];  Service: Pain Management    LITHOTRIPSY  2004    OTHER  Lithotripsy wisdom teeth    OTHER      cervical polyp removed       Family history   Family History   Problem Relation Age of Onset    Arthritis Mother         Rheumatoid arthritis    Diabetes Father     Hypertension Father     Hyperlipidemia Father     Hypertension Brother     Hypertension Maternal Grandmother     Cancer Maternal Grandfather         mesothelioma from asbestos    Hypertension Paternal Grandmother     Cancer Paternal Grandfather         bone    Hypertension Paternal Grandfather          Medications:   Outpatient Medications Marked as Taking for the 9/19/23 encounter (Office Visit) with Bro Mojica M.D.   Medication Sig Dispense Refill    cyclobenzaprine (FLEXERIL) 10 mg Tab Take 1 Tablet by mouth 3 times a day as needed for Mild Pain, Moderate Pain or Muscle Spasms. 90 Tablet 3    LORazepam (ATIVAN) 1 MG Tab Take 1 mg by mouth  2 times a day as needed.      docusate sodium (COLACE) 100 MG Cap Take 100 mg by mouth 2 times a day.      albuterol 108 (90 Base) MCG/ACT Aero Soln inhalation aerosol Inhale 2 Puffs every 6 hours as needed for Shortness of Breath. 8.5 g 0    Ascorbic Acid (VITAMIN C) 1000 MG Tab Take  by mouth.      COLLAGEN PO Take  by mouth.      Cholecalciferol (VITAMIN D3 PO) Take 50,000 Units by mouth. Once a week      acetaminophen (TYLENOL) 500 MG Tab Take 500-1,000 mg by mouth every 6 hours as needed.      LORAZEPAM PO Take  by mouth.      ibuprofen (MOTRIN) 600 MG Tab Take 600 mg by mouth every 6 hours as needed.          Current Outpatient Medications on File Prior to Visit   Medication Sig Dispense Refill    LORazepam (ATIVAN) 1 MG Tab Take 1 mg by mouth 2 times a day as needed.      docusate sodium (COLACE) 100 MG Cap Take 100 mg by mouth 2 times a day.      albuterol 108 (90 Base) MCG/ACT Aero Soln inhalation aerosol Inhale 2 Puffs every 6 hours as needed for Shortness of Breath. 8.5 g 0    Ascorbic Acid (VITAMIN C) 1000 MG Tab Take  by mouth.      COLLAGEN PO Take  by mouth.      Cholecalciferol (VITAMIN D3 PO) Take 50,000 Units by mouth. Once a week      acetaminophen (TYLENOL) 500 MG Tab Take 500-1,000 mg by mouth every 6 hours as needed.      LORAZEPAM PO Take  by mouth.      ibuprofen (MOTRIN) 600 MG Tab Take 600 mg by mouth every 6 hours as needed.      promethazine-dextromethorphan (PROMETHAZINE-DM) 6.25-15 MG/5ML syrup Take 5 mL by mouth 4 times a day as needed for Cough. (Patient not taking: Reported on 12/13/2021) 120 mL 0    losartan (COZAAR) 50 MG Tab Take 50 mg by mouth every day. (Patient not taking: Reported on 9/19/2023)      progesterone (PROMETRIUM) 200 MG capsule TAKE 1 CAPSULE BY MOUTH ONCE DAILY FOR 12 DAYS EVERY 3 4 MONTHS (Patient not taking: No sig reported)       No current facility-administered medications on file prior to visit.         Allergies:   Allergies   Allergen Reactions    Contrast  "Media With Iodine [Iodine]      Pt reports becoming SOB with contrast media with iodine in the past    Gabapentin      Cognitive side effects     Other Drug      \"some epilepsy medications, unsure of name\"       Social Hx:   Social History     Socioeconomic History    Marital status:      Spouse name: Not on file    Number of children: Not on file    Years of education: Not on file    Highest education level: Not on file   Occupational History    Not on file   Tobacco Use    Smoking status: Former     Current packs/day: 0.00     Types: Cigarettes     Quit date:      Years since quittin.7    Smokeless tobacco: Never    Tobacco comments:     Socially twice a month now   Vaping Use    Vaping Use: Never used   Substance and Sexual Activity    Alcohol use: No    Drug use: No    Sexual activity: Yes     Partners: Male     Birth control/protection: Rhythm   Other Topics Concern     Service No    Blood Transfusions No    Caffeine Concern No    Occupational Exposure No    Hobby Hazards No    Sleep Concern Yes    Stress Concern No    Weight Concern Yes    Special Diet No    Back Care Yes    Exercise No    Bike Helmet No    Seat Belt Yes    Self-Exams Yes   Social History Narrative    Not on file     Social Determinants of Health     Financial Resource Strain: Not on file   Food Insecurity: Not on file   Transportation Needs: Not on file   Physical Activity: Not on file   Stress: Not on file   Social Connections: Not on file   Intimate Partner Violence: Not on file   Housing Stability: Not on file         EXAMINATION     Physical Exam:   Vitals: /86 (BP Location: Right arm, Patient Position: Sitting, BP Cuff Size: Large adult)   Pulse 75   Temp 36.2 °C (97.1 °F) (Temporal)   Ht 1.753 m (5' 9\")   Wt 108 kg (237 lb 3.4 oz)   SpO2 99%     Constitutional:   Body Habitus: Body mass index is 35.03 kg/m².  Cooperation: Fully cooperates with exam  Appearance: Well-groomed no " "disheveled    Respiratory-  breathing comfortable on room air, no audible wheezing  Cardiovascular- capillary refills less than 2 seconds. No lower extremity edema is noted.   Psychiatric- alert and oriented ×3. Normal affect.    MSK and Neuro: -    FAIRs maneuver and Grant's maneuver positive on the left for reproducing hip pain, buttock pain and back pain.  Thrust, SI joint distraction and compression were negative bilaterally.  No tenderness palpation along the SI joint.  Strength is 5 out of 5 in the bilateral lower extremities with sensation intact.          MEDICAL DECISION MAKING    DATA    Labs:   Lab Results   Component Value Date/Time    SODIUM 142 12/17/2021 03:17 AM    POTASSIUM 4.1 12/17/2021 03:17 AM    CHLORIDE 109 12/17/2021 03:17 AM    CO2 20 12/17/2021 03:17 AM    GLUCOSE 106 (H) 12/17/2021 03:17 AM    BUN 8 12/17/2021 03:17 AM    CREATININE 0.44 (L) 12/17/2021 03:17 AM        Lab Results   Component Value Date/Time    PROTHROMBTM 14.0 12/13/2021 09:44 AM    INR 1.11 12/13/2021 09:44 AM        Lab Results   Component Value Date/Time    WBC 11.1 (H) 12/17/2021 03:17 AM    RBC 4.31 12/17/2021 03:17 AM    HEMOGLOBIN 12.8 12/17/2021 03:17 AM    HEMATOCRIT 38.9 12/17/2021 03:17 AM    MCV 90.3 12/17/2021 03:17 AM    MCH 29.7 12/17/2021 03:17 AM    MCHC 32.9 (L) 12/17/2021 03:17 AM    MPV 10.9 12/17/2021 03:17 AM        No results found for: \"HBA1C\"       Imaging:   I personally reviewed following images  X-ray hips 8/4/2021 with significant prominence of the anterolateral portion of the head neck junction of the bilateral hips consistent with hip impingement syndrome.    I reviewed the fluoroscopic images from 11/16/2020 showing successful left L5-S1 and S1 transforaminal epidural steroid injection.  I reviewed these with the patient on 12/14/2020.    MRI lumbar spine dated 10/16/2020  At L5-S1 there is a large left paracentral disc herniation with impingement on the descending left S1 nerve root.  At " L4-5 there is a small disc protrusion with mild left neuroforaminal stenosis.    I reviewed the following radiology reports    XR hip                 Results for orders placed in visit on 10/16/20   MR-LUMBAR SPINE-W/O                                                                                                                                                                                                      Results for orders placed during the hospital encounter of 20   DX-LUMBAR SPINE-2 OR 3 VIEWS    Impression No significant spondylosis. No acute fracture or listhesis.                                           DIAGNOSIS   Visit Diagnoses     ICD-10-CM   1. Chronic left hip pain  M25.552    G89.29   2. Left hip impingement syndrome  M25.852   3. Arthritis of hip  M16.10   4. Chronic left-sided low back pain with left-sided sciatica  M54.42    G89.29   5. Lumbar radiculopathy  M54.16   6. Obesity (BMI 30-39.9)  E66.9   7. Exercise counseling  Z71.82   8. Dietary counseling  Z71.3           ASSESSMENT and PLAN:     José Luis Marinelli Bere  1975 female      José Luis Marinelli was seen today for follow-up.    Diagnoses and all orders for this visit:    Chronic left hip pain  -     Referral to Physical Medicine Rehab  -     cyclobenzaprine (FLEXERIL) 10 mg Tab; Take 1 Tablet by mouth 3 times a day as needed for Mild Pain, Moderate Pain or Muscle Spasms.    Left hip impingement syndrome  -     Referral to Physical Medicine Rehab  -     cyclobenzaprine (FLEXERIL) 10 mg Tab; Take 1 Tablet by mouth 3 times a day as needed for Mild Pain, Moderate Pain or Muscle Spasms.    Arthritis of hip  -     Referral to Physical Medicine Rehab  -     cyclobenzaprine (FLEXERIL) 10 mg Tab; Take 1 Tablet by mouth 3 times a day as needed for Mild Pain, Moderate Pain or Muscle Spasms.    Chronic left-sided low back pain with left-sided sciatica  -     cyclobenzaprine (FLEXERIL) 10 mg Tab; Take 1 Tablet by mouth 3 times a day as  needed for Mild Pain, Moderate Pain or Muscle Spasms.    Lumbar radiculopathy  -     cyclobenzaprine (FLEXERIL) 10 mg Tab; Take 1 Tablet by mouth 3 times a day as needed for Mild Pain, Moderate Pain or Muscle Spasms.    Obesity (BMI 30-39.9)    Exercise counseling    Dietary counseling        I believe the patient's pain is multifactorial however majority of her pain on exam was with FAIRs maneuver which reproduced hip and low back pain.  I believe the majority the pain is from the left hip joint.    I have ordered diagnostic and therapeutic fluoroscopically guided intra-articular left hip injection.  The patient has needle phobia and we will plan to do this with sedation with Versed only.  No IV pain medications were used during the procedure.    The risks benefits and alternatives to this procedure were discussed and the patient wishes to proceed with the procedure. Risks include but are not limited to damage to surrounding structures, infection, bleeding, worsening of pain which can be permanent, weakness which can be permanent. Benefits include pain relief, improved function. Alternatives includes not doing the procedure.          Follow up: After the above diagnostic and therapeutic procedure    Thank you for allowing me to participate in the care of this patient. If you have any questions please not hesitate to contact me.             Please note that this dictation was created using voice recognition software. I have made every reasonable attempt to correct obvious errors but there may be errors of grammar and content that I may have overlooked prior to finalization of this note.      Bro Mojica MD  Interventional Spine and Sports Physiatry  Physical Medicine and Rehabilitation  [facility]

## 2023-10-11 ENCOUNTER — APPOINTMENT (OUTPATIENT)
Dept: RADIOLOGY | Facility: REHABILITATION | Age: 48
End: 2023-10-11
Attending: PHYSICAL MEDICINE & REHABILITATION
Payer: COMMERCIAL

## 2023-10-11 ENCOUNTER — HOSPITAL ENCOUNTER (OUTPATIENT)
Facility: REHABILITATION | Age: 48
End: 2023-10-11
Attending: PHYSICAL MEDICINE & REHABILITATION | Admitting: PHYSICAL MEDICINE & REHABILITATION
Payer: COMMERCIAL

## 2023-10-11 VITALS
TEMPERATURE: 98.8 F | HEIGHT: 69 IN | SYSTOLIC BLOOD PRESSURE: 129 MMHG | HEART RATE: 65 BPM | RESPIRATION RATE: 16 BRPM | WEIGHT: 233.25 LBS | OXYGEN SATURATION: 99 % | DIASTOLIC BLOOD PRESSURE: 81 MMHG | BODY MASS INDEX: 34.55 KG/M2

## 2023-10-11 PROCEDURE — 99152 MOD SED SAME PHYS/QHP 5/>YRS: CPT

## 2023-10-11 PROCEDURE — 20610 DRAIN/INJ JOINT/BURSA W/O US: CPT

## 2023-10-11 PROCEDURE — A9579 GAD-BASE MR CONTRAST NOS,1ML: HCPCS

## 2023-10-11 PROCEDURE — 700117 HCHG RX CONTRAST REV CODE 255

## 2023-10-11 PROCEDURE — 700111 HCHG RX REV CODE 636 W/ 250 OVERRIDE (IP): Mod: JZ

## 2023-10-11 RX ORDER — MIDAZOLAM HYDROCHLORIDE 1 MG/ML
INJECTION INTRAMUSCULAR; INTRAVENOUS
Status: COMPLETED
Start: 2023-10-11 | End: 2023-10-11

## 2023-10-11 RX ORDER — DEXAMETHASONE SODIUM PHOSPHATE 10 MG/ML
INJECTION, SOLUTION INTRAMUSCULAR; INTRAVENOUS
Status: COMPLETED
Start: 2023-10-11 | End: 2023-10-11

## 2023-10-11 RX ORDER — LIDOCAINE HYDROCHLORIDE 10 MG/ML
INJECTION, SOLUTION EPIDURAL; INFILTRATION; INTRACAUDAL; PERINEURAL
Status: DISCONTINUED
Start: 2023-10-11 | End: 2023-10-11 | Stop reason: HOSPADM

## 2023-10-11 RX ORDER — LIDOCAINE HYDROCHLORIDE 10 MG/ML
INJECTION, SOLUTION EPIDURAL; INFILTRATION; INTRACAUDAL; PERINEURAL
Status: COMPLETED
Start: 2023-10-11 | End: 2023-10-11

## 2023-10-11 RX ADMIN — DEXAMETHASONE SODIUM PHOSPHATE 10 MG: 10 INJECTION, SOLUTION INTRAMUSCULAR; INTRAVENOUS at 08:12

## 2023-10-11 RX ADMIN — GADOTERIDOL 10 ML: 279.3 INJECTION, SOLUTION INTRAVENOUS at 08:11

## 2023-10-11 RX ADMIN — MIDAZOLAM 1.5 MG: 1 INJECTION, SOLUTION INTRAMUSCULAR; INTRAVENOUS at 08:05

## 2023-10-11 RX ADMIN — LIDOCAINE HYDROCHLORIDE 10 ML: 10 INJECTION, SOLUTION EPIDURAL; INFILTRATION; INTRACAUDAL at 08:11

## 2023-10-11 ASSESSMENT — PAIN DESCRIPTION - PAIN TYPE
TYPE: CHRONIC PAIN

## 2023-10-11 ASSESSMENT — FIBROSIS 4 INDEX: FIB4 SCORE: 1.15

## 2023-10-11 NOTE — OP REPORT
Date of Service: 10/11/2023     Patient: José Luis Blackburn 47 y.o. female     MRN: [id number]     Physician/s: Bro Mojica MD    Pre-operative Diagnosis: left  hip osteoarthritis, hip pain    Post-operative Diagnosis: left hip osteoarthritis, hip pain    Procedure: left diagnostic and  therapeutic intra-articular Hip injection with fluoroscopic guidance with sedation     Description of procedure:    The risks, benefits, and alternatives of the procedure were reviewed and discussed with the patient.  Written informed consent was freely obtained. A pre-procedural time-out was conducted by the physician verifying patient’s identity, procedure to be performed, procedure site and side, and allergy verification. Appropriate equipment was determined to be in place for the procedure.     Moderation sedation was achieved with Versed (1.5mg). Monitoring of the patients vital signs and respiratory status was provided by trained independent registered nurse during the entire course of the procedures and under my supervision and recoded in the patient’s medical record. The duration of sedation was over 10 minutes.     The femoral artery nerve and vein as well as the inguinal ligament were identified with ultrasound and marked with a marking pen.  The entire procedure took place lateral to the femoral vessels and nerve and inferior to the inguinal ligament.    In the procedure suite the patient was placed in a supine position with and the skin was prepped and draped in the usual sterile fashion. A 27-gauge 1.5 inch needle was used with approximately 2 mL of 1% lidocaine for local anesthesia at the junction of the LEFT femoral head and neck.  A 22g 5 inch needle was placed into skin and advanced under fluoroscopic guidance into the joint space.A minimum amount of contrast was used to clearly demonstrate the outlining of the joint capsule. Following negative aspiration, 5mL of 1% lidocaine with 1mL of 10mg/mL dexamethasone  was then injected into the joint, and the needle was subsequently removed intact after restyleted. The patient's hip was wiped with a 4x4 gauze, the area was cleansed with alcohol prep, and a bandaid was applied. There were no complications noted.     This was a challenging procedure given the patients Body mass index is 34.44 kg/m².. This required longer needles.  A typical procedure such as this would require 25g 3.5 inch needles.  This procedure required 22g 5 inch needles.  This added to the mental effort for complexity this procedure.  This also made acquiring fluoroscopic images more time-consuming and challenging.  Final fluoroscopic images were obtained and saved.       The patient had 100% pain relief of the index pain postprocedure  Preprocedural pain: 8/10 NRS with FAIRs maneuver  Postprocedural pain: 0/10 NRS with FAIRs maneuver    Follow-up as scheduled      Bro Mojica MD  Interventional Spine and Pain  Physical Medicine and Rehabilitation  [facility]          CPT  Major joint/bursa:  29843-26  Fluoroscopic  needle guidance 94026-89  moderate procedural sedation first 15 minutes: [id number]

## 2023-10-11 NOTE — OR SURGEON
Immediate Post OP Note    Pre-Op Diagnosis Codes:     * Chronic left hip pain [M25.552, G89.29]     * Left hip impingement syndrome [M25.852]     * Arthritis of hip [M16.10]      Post-Op Diagnosis Codes:     * Chronic left hip pain [M25.552, G89.29]     * Left hip impingement syndrome [M25.852]     * Arthritis of hip [M16.10]      Procedure(s):  Diagnostic and therapeutic Fluoroscopically guided intra-articular LEFT hip injection with sedation.       Note: plan on sedation 1.5mg versed. - Wound Class: Clean    Surgeon(s):  Bro Mojica M.D.    Anesthesiologist/Type of Anesthesia:  No anesthesia staff entered./Local    Surgical Staff:  Circulator: Rosa Pinzon R.N.  Scrub Person: Rema Cullen  Radiology Technologist: Saloni Butts    Specimens removed if any:  * No specimens in log *    Estimated Blood Loss: None    Findings: None    Complications: None        10/11/2023 8:21 AM Bro Mojica M.D.

## 2023-10-11 NOTE — PROGRESS NOTES
0718 Pt admitted to Pre Procedure area.  Consent signed and post op instructions reviewed with pt, all questions answered.   0758 Dr. Mojica in to see pt.      0815 Pt received to Post Procedure area with updates from procedure RN.  Snack and drink tolerated without C/O N/V.  Dressing clear, dry, no swelling noted.    0825 Pt seen by Dr. Mojica for post procedure evaluation.     0840 Pt ambulated without difficulty & meets criteria for DC, accompanied to car and placed in passenger seat.  Discharged to designated .    
Negative Screen

## 2023-10-11 NOTE — INTERVAL H&P NOTE
H&P reviewed. The patient was examined and there are no changes to the H&P     Lungs clear to auscultation bilaterally.  No abdominal tenderness.  Heart regular rate and rhythm.  Vitals reviewed.    Proceed with the originally scheduled procedure.  The risks, benefits and alternatives were discussed.  The patient understands these.    Pre-Op Diagnosis Codes:     * Chronic left hip pain [M25.552, G89.29]     * Left hip impingement syndrome [M25.852]     * Arthritis of hip [M16.10]  Procedure(s):  Diagnostic and therapeutic Fluoroscopically guided intra-articular LEFT hip injection with sedation.       Note: plan on sedation 1.5mg versed.    Bro Mojica MD  Physical Medicine and Rehabilitation  Interventional Spine and Sports Physiatry  [facility]

## 2023-11-13 ENCOUNTER — OFFICE VISIT (OUTPATIENT)
Dept: PHYSICAL MEDICINE AND REHAB | Facility: MEDICAL CENTER | Age: 48
End: 2023-11-13
Payer: COMMERCIAL

## 2023-11-13 VITALS
TEMPERATURE: 97.2 F | HEART RATE: 67 BPM | HEIGHT: 69 IN | DIASTOLIC BLOOD PRESSURE: 80 MMHG | BODY MASS INDEX: 34.61 KG/M2 | WEIGHT: 233.69 LBS | SYSTOLIC BLOOD PRESSURE: 130 MMHG | OXYGEN SATURATION: 97 %

## 2023-11-13 DIAGNOSIS — M25.852 LEFT HIP IMPINGEMENT SYNDROME: ICD-10-CM

## 2023-11-13 DIAGNOSIS — M25.552 CHRONIC LEFT HIP PAIN: ICD-10-CM

## 2023-11-13 DIAGNOSIS — Z71.82 EXERCISE COUNSELING: ICD-10-CM

## 2023-11-13 DIAGNOSIS — E66.9 OBESITY (BMI 30-39.9): ICD-10-CM

## 2023-11-13 DIAGNOSIS — M54.16 LUMBAR RADICULOPATHY: ICD-10-CM

## 2023-11-13 DIAGNOSIS — M54.42 CHRONIC LEFT-SIDED LOW BACK PAIN WITH LEFT-SIDED SCIATICA: ICD-10-CM

## 2023-11-13 DIAGNOSIS — M16.10 ARTHRITIS OF HIP: ICD-10-CM

## 2023-11-13 DIAGNOSIS — G89.29 CHRONIC LEFT HIP PAIN: ICD-10-CM

## 2023-11-13 DIAGNOSIS — G89.29 CHRONIC LEFT-SIDED LOW BACK PAIN WITH LEFT-SIDED SCIATICA: ICD-10-CM

## 2023-11-13 PROCEDURE — 3075F SYST BP GE 130 - 139MM HG: CPT | Performed by: PHYSICAL MEDICINE & REHABILITATION

## 2023-11-13 PROCEDURE — 3079F DIAST BP 80-89 MM HG: CPT | Performed by: PHYSICAL MEDICINE & REHABILITATION

## 2023-11-13 PROCEDURE — 1125F AMNT PAIN NOTED PAIN PRSNT: CPT | Performed by: PHYSICAL MEDICINE & REHABILITATION

## 2023-11-13 PROCEDURE — 99214 OFFICE O/P EST MOD 30 MIN: CPT | Performed by: PHYSICAL MEDICINE & REHABILITATION

## 2023-11-13 RX ORDER — PHENTOLAMINE MESYLATE 5 MG/1
15 INJECTION INTRAMUSCULAR; INTRAVENOUS ONCE
COMMUNITY

## 2023-11-13 ASSESSMENT — PATIENT HEALTH QUESTIONNAIRE - PHQ9: CLINICAL INTERPRETATION OF PHQ2 SCORE: 0

## 2023-11-13 ASSESSMENT — FIBROSIS 4 INDEX: FIB4 SCORE: 1.15

## 2023-11-13 ASSESSMENT — PAIN SCALES - GENERAL: PAINLEVEL: 1=MINIMAL PAIN

## 2023-11-14 NOTE — PROGRESS NOTES
Follow up patient note  Interventional spine and sports physiatry, Physical medicine rehabilitation      Chief complaint:   Chief Complaint   Patient presents with    Follow-Up     Chronic left hip pain          HISTORY    Please see new patient note by Dr Mojica,  for more details.     HPI  Patient identification: José Luis Blackburn ,  1975,   With Diagnoses of Left hip impingement syndrome, Chronic left hip pain, Arthritis of hip, Chronic left-sided low back pain with left-sided sciatica, Lumbar radiculopathy, BMI 30-39.9, and Exercise counseling were pertinent to this visit.     Procedures:  2020 left Lumbar Transforaminal Epidural Steroid  at the L5-S1 and S1 levels 100% improvement in radicular component and 50% improvement in back pain  8/10/2021 left sacroiliac joint injection with 50% pain relief  10/11/2023 left hip injection 90% pain relief postprocedure.    Patient had 90% improvement in pain and function after the hip injection.  Preprocedure pain 8/10 NRS postprocedure pain 1/10 NRS.  She has returned to her exercise program.  She is now biking and working on weight loss.        The patient has had weight loss of over 50 pounds after diet and exercise improvements as well as bariatric surgery.      ROS Red Flags :   Fever, Chills, Sweats: Denies  Involuntary Weight Loss: Denies  Bowel/Bladder Incontinence: Denies  Saddle Anesthesia: Denies        PMHx:   Past Medical History:   Diagnosis Date    Allergy     Anemia     with pregnancy    Anxiety     Anxiety disorder     Arthritis 2021    hips    Bowel habit changes 2021    constipation    Depression     Headache(784.0)     kid ston     pac 2009    and PVCs    PONV (postoperative nausea and vomiting)     I throw up from anesthesia    Sciatica     Seizure (HCC)     Petit mal until puberty    Spinal headache     Spinal headache from epidural due to childbirth    Urinary incontinence     Leak urine from childbirth     Urinary tract infection, site not specified        PSHx:   Past Surgical History:   Procedure Laterality Date    NM DRAIN/INJECT LARGE JOINT/BURSA Left 10/11/2023    Procedure: Diagnostic and therapeutic Fluoroscopically guided intra-articular LEFT hip injection with sedation.       Note: plan on sedation 1.5mg versed.;  Surgeon: Bor Mojica M.D.;  [facility];  Service: Pain Management    NM LAP, KEMAR RESTRICT PROC, LONGITUDINAL GAS*  12/15/2021    Procedure: GASTRECTOMY, SLEEVE, LAPAROSCOPIC;  Surgeon: Edu Luis M.D.;  Location: [facility];  Service: General    NM INJECTION,SACROILIAC JOINT Left 8/10/2021    Procedure: LEFT sacroiliac joint injection with sedation;  Surgeon: Bro Mojica M.D.;  Location: [facility];  Service: Pain Management    LUMBAR TRANSFORAMINAL EPIDURAL STEROID INJECTION Left 11/16/2020    Procedure: INJECTION, STEROID, SPINE, LUMBAR, EPIDURAL, TRANSFORAMINAL APPROACH;  Surgeon: Bro Mojica M.D.;  Location: [facility];  Service: Pain Management    LITHOTRIPSY  2004    OTHER  Lithotripsy wisdom teeth    OTHER      cervical polyp removed       Family history   Family History   Problem Relation Age of Onset    Arthritis Mother         Rheumatoid arthritis    Diabetes Father     Hypertension Father     Hyperlipidemia Father     Hypertension Brother     Hypertension Maternal Grandmother     Cancer Maternal Grandfather         mesothelioma from asbestos    Hypertension Paternal Grandmother     Cancer Paternal Grandfather         bone    Hypertension Paternal Grandfather          Medications:   Outpatient Medications Marked as Taking for the 11/13/23 encounter (Office Visit) with Bro Mojica M.D.   Medication Sig Dispense Refill    phentolamine mesylate (REGITINE) 5 MG Recon Soln 15 mg one time.      cyclobenzaprine (FLEXERIL) 10 mg Tab Take 1 Tablet by mouth 3 times a day as needed for Mild Pain,  Moderate Pain or Muscle Spasms. 90 Tablet 3    LORazepam (ATIVAN) 1 MG Tab Take 1 mg by mouth 2 times a day as needed.      COLLAGEN PO Take  by mouth.      acetaminophen (TYLENOL) 500 MG Tab Take 500-1,000 mg by mouth every 6 hours as needed.          Current Outpatient Medications on File Prior to Visit   Medication Sig Dispense Refill    phentolamine mesylate (REGITINE) 5 MG Recon Soln 15 mg one time.      cyclobenzaprine (FLEXERIL) 10 mg Tab Take 1 Tablet by mouth 3 times a day as needed for Mild Pain, Moderate Pain or Muscle Spasms. 90 Tablet 3    LORazepam (ATIVAN) 1 MG Tab Take 1 mg by mouth 2 times a day as needed.      COLLAGEN PO Take  by mouth.      acetaminophen (TYLENOL) 500 MG Tab Take 500-1,000 mg by mouth every 6 hours as needed.      docusate sodium (COLACE) 100 MG Cap Take 100 mg by mouth 2 times a day.      albuterol 108 (90 Base) MCG/ACT Aero Soln inhalation aerosol Inhale 2 Puffs every 6 hours as needed for Shortness of Breath. 8.5 g 0    promethazine-dextromethorphan (PROMETHAZINE-DM) 6.25-15 MG/5ML syrup Take 5 mL by mouth 4 times a day as needed for Cough. (Patient not taking: Reported on 12/13/2021) 120 mL 0    Ascorbic Acid (VITAMIN C) 1000 MG Tab Take  by mouth.      losartan (COZAAR) 50 MG Tab Take 50 mg by mouth every day. (Patient not taking: Reported on 9/19/2023)      Cholecalciferol (VITAMIN D3 PO) Take 50,000 Units by mouth. Once a week      progesterone (PROMETRIUM) 200 MG capsule TAKE 1 CAPSULE BY MOUTH ONCE DAILY FOR 12 DAYS EVERY 3 4 MONTHS (Patient not taking: No sig reported)      LORAZEPAM PO Take  by mouth.      ibuprofen (MOTRIN) 600 MG Tab Take 600 mg by mouth every 6 hours as needed.       No current facility-administered medications on file prior to visit.         Allergies:   Allergies   Allergen Reactions    Contrast Media With Iodine [Iodine]      Pt reports becoming SOB with contrast media with iodine in the past    Gabapentin      Cognitive side effects     Other  "Drug      \"some epilepsy medications, unsure of name\"       Social Hx:   Social History     Socioeconomic History    Marital status:      Spouse name: Not on file    Number of children: Not on file    Years of education: Not on file    Highest education level: Not on file   Occupational History    Not on file   Tobacco Use    Smoking status: Former     Current packs/day: 0.00     Types: Cigarettes     Quit date:      Years since quittin.8    Smokeless tobacco: Never    Tobacco comments:     Socially twice a month now   Vaping Use    Vaping Use: Never used   Substance and Sexual Activity    Alcohol use: No    Drug use: No    Sexual activity: Yes     Partners: Male     Birth control/protection: Rhythm   Other Topics Concern     Service No    Blood Transfusions No    Caffeine Concern No    Occupational Exposure No    Hobby Hazards No    Sleep Concern Yes    Stress Concern No    Weight Concern Yes    Special Diet No    Back Care Yes    Exercise No    Bike Helmet No    Seat Belt Yes    Self-Exams Yes   Social History Narrative    Not on file     Social Determinants of Health     Financial Resource Strain: Not on file   Food Insecurity: Not on file   Transportation Needs: Not on file   Physical Activity: Not on file   Stress: Not on file   Social Connections: Not on file   Intimate Partner Violence: Not on file   Housing Stability: Not on file         EXAMINATION     Physical Exam:   Vitals: /80 (BP Location: Left arm, Patient Position: Sitting, BP Cuff Size: Adult)   Pulse 67   Temp 36.2 °C (97.2 °F) (Temporal)   Ht 1.753 m (5' 9\")   Wt 106 kg (233 lb 11 oz)   SpO2 97%     Constitutional:   Body Habitus: Body mass index is 34.51 kg/m².  Cooperation: Fully cooperates with exam  Appearance: Well-groomed no disheveled    Respiratory-  breathing comfortable on room air, no audible wheezing  Cardiovascular- capillary refills less than 2 seconds. No lower extremity edema is noted. " "  Psychiatric- alert and oriented ×3. Normal affect.    MSK and Neuro: -    FAIRs maneuver and Grant's maneuver negative bilaterally.  Strength is 5 out of 5 in the bilateral lower extremities.        MEDICAL DECISION MAKING    DATA    Labs:   Lab Results   Component Value Date/Time    SODIUM 142 12/17/2021 03:17 AM    POTASSIUM 4.1 12/17/2021 03:17 AM    CHLORIDE 109 12/17/2021 03:17 AM    CO2 20 12/17/2021 03:17 AM    GLUCOSE 106 (H) 12/17/2021 03:17 AM    BUN 8 12/17/2021 03:17 AM    CREATININE 0.44 (L) 12/17/2021 03:17 AM        Lab Results   Component Value Date/Time    PROTHROMBTM 14.0 12/13/2021 09:44 AM    INR 1.11 12/13/2021 09:44 AM        Lab Results   Component Value Date/Time    WBC 11.1 (H) 12/17/2021 03:17 AM    RBC 4.31 12/17/2021 03:17 AM    HEMOGLOBIN 12.8 12/17/2021 03:17 AM    HEMATOCRIT 38.9 12/17/2021 03:17 AM    MCV 90.3 12/17/2021 03:17 AM    MCH 29.7 12/17/2021 03:17 AM    MCHC 32.9 (L) 12/17/2021 03:17 AM    MPV 10.9 12/17/2021 03:17 AM        No results found for: \"HBA1C\"       Imaging:   I personally reviewed following images  X-ray hips 8/4/2021 with significant prominence of the anterolateral portion of the head neck junction of the bilateral hips consistent with hip impingement syndrome.    I reviewed the fluoroscopic images from 11/16/2020 showing successful left L5-S1 and S1 transforaminal epidural steroid injection.  I reviewed these with the patient on 12/14/2020.    MRI lumbar spine dated 10/16/2020  At L5-S1 there is a large left paracentral disc herniation with impingement on the descending left S1 nerve root.  At L4-5 there is a small disc protrusion with mild left neuroforaminal stenosis.    I reviewed the following radiology reports    XR hip                 Results for orders placed in visit on 10/16/20   MR-LUMBAR SPINE-W/O                                                                                                                                                          "                                             Results for orders placed during the hospital encounter of 20   DX-LUMBAR SPINE-2 OR 3 VIEWS    Impression No significant spondylosis. No acute fracture or listhesis.                                           DIAGNOSIS   Visit Diagnoses     ICD-10-CM   1. Left hip impingement syndrome  M25.852   2. Chronic left hip pain  M25.552    G89.29   3. Arthritis of hip  M16.10   4. Chronic left-sided low back pain with left-sided sciatica  M54.42    G89.29   5. Lumbar radiculopathy  M54.16   6. BMI 30-39.9  E66.9   7. Exercise counseling  Z71.82           ASSESSMENT and PLAN:     José Luis Marinelli Bere  1975 female      José Luis Marinelli was seen today for follow-up.    Diagnoses and all orders for this visit:    Left hip impingement syndrome    Chronic left hip pain    Arthritis of hip    Chronic left-sided low back pain with left-sided sciatica    Lumbar radiculopathy    BMI 30-39.9    Exercise counseling        The patient had excellent improvement in pain and function after the left intra-articular hip injection.  We discussed additions to home exercise program.    Medications: Continue gabapentin as needed.  Continue Flexeril as needed.      Follow up: 1 year or sooner as needed    Thank you for allowing me to participate in the care of this patient. If you have any questions please not hesitate to contact me.             Please note that this dictation was created using voice recognition software. I have made every reasonable attempt to correct obvious errors but there may be errors of grammar and content that I may have overlooked prior to finalization of this note.      Bro Mojica MD  Interventional Spine and Sports Physiatry  Physical Medicine and Rehabilitation  [facility]

## 2023-12-04 ENCOUNTER — OFFICE VISIT (OUTPATIENT)
Dept: PHYSICAL MEDICINE AND REHAB | Facility: MEDICAL CENTER | Age: 48
End: 2023-12-04
Payer: COMMERCIAL

## 2023-12-04 VITALS
DIASTOLIC BLOOD PRESSURE: 80 MMHG | HEART RATE: 70 BPM | BODY MASS INDEX: 33.86 KG/M2 | TEMPERATURE: 97 F | SYSTOLIC BLOOD PRESSURE: 118 MMHG | WEIGHT: 229.28 LBS | OXYGEN SATURATION: 97 %

## 2023-12-04 DIAGNOSIS — M54.42 CHRONIC LEFT-SIDED LOW BACK PAIN WITH LEFT-SIDED SCIATICA: ICD-10-CM

## 2023-12-04 DIAGNOSIS — M25.852 LEFT HIP IMPINGEMENT SYNDROME: ICD-10-CM

## 2023-12-04 DIAGNOSIS — M79.605 LEFT LEG PAIN: ICD-10-CM

## 2023-12-04 DIAGNOSIS — G89.29 CHRONIC LEFT HIP PAIN: ICD-10-CM

## 2023-12-04 DIAGNOSIS — Z78.9 IMPAIRED MOBILITY AND ADLS: ICD-10-CM

## 2023-12-04 DIAGNOSIS — G89.29 CHRONIC LEFT-SIDED LOW BACK PAIN WITH LEFT-SIDED SCIATICA: ICD-10-CM

## 2023-12-04 DIAGNOSIS — R20.0 LEFT LEG NUMBNESS: ICD-10-CM

## 2023-12-04 DIAGNOSIS — M54.16 LUMBAR RADICULOPATHY: ICD-10-CM

## 2023-12-04 DIAGNOSIS — Z74.09 IMPAIRED MOBILITY AND ADLS: ICD-10-CM

## 2023-12-04 DIAGNOSIS — E66.9 OBESITY (BMI 30-39.9): ICD-10-CM

## 2023-12-04 DIAGNOSIS — Z71.82 EXERCISE COUNSELING: ICD-10-CM

## 2023-12-04 DIAGNOSIS — M16.10 ARTHRITIS OF HIP: ICD-10-CM

## 2023-12-04 DIAGNOSIS — M25.552 CHRONIC LEFT HIP PAIN: ICD-10-CM

## 2023-12-04 PROCEDURE — 99214 OFFICE O/P EST MOD 30 MIN: CPT | Performed by: PHYSICAL MEDICINE & REHABILITATION

## 2023-12-04 PROCEDURE — 3074F SYST BP LT 130 MM HG: CPT | Performed by: PHYSICAL MEDICINE & REHABILITATION

## 2023-12-04 PROCEDURE — 3079F DIAST BP 80-89 MM HG: CPT | Performed by: PHYSICAL MEDICINE & REHABILITATION

## 2023-12-04 PROCEDURE — 1125F AMNT PAIN NOTED PAIN PRSNT: CPT | Performed by: PHYSICAL MEDICINE & REHABILITATION

## 2023-12-04 RX ORDER — PHENTERMINE HYDROCHLORIDE 15 MG/1
CAPSULE ORAL
COMMUNITY

## 2023-12-04 ASSESSMENT — PAIN SCALES - GENERAL: PAINLEVEL: 8=MODERATE-SEVERE PAIN

## 2023-12-04 ASSESSMENT — PATIENT HEALTH QUESTIONNAIRE - PHQ9: CLINICAL INTERPRETATION OF PHQ2 SCORE: 0

## 2023-12-04 ASSESSMENT — FIBROSIS 4 INDEX: FIB4 SCORE: 1.17

## 2023-12-04 NOTE — H&P (VIEW-ONLY)
Follow up patient note  Interventional spine and sports physiatry, Physical medicine rehabilitation      Chief complaint:   Chief Complaint   Patient presents with    Follow-Up     Left hip impingement syndrome          HISTORY    Please see new patient note by Dr Mojica,  for more details.     HPI  Patient identification: José Luis Blackburn ,  1975,   With Diagnoses of Lumbar radiculopathy, Left leg pain, Left leg numbness, Left hip impingement syndrome, Chronic left hip pain, Arthritis of hip, Chronic left-sided low back pain with left-sided sciatica, BMI 30-39.9, Exercise counseling, and Impaired mobility and ADLs were pertinent to this visit.     Procedures:  2020 left Lumbar Transforaminal Epidural Steroid  at the L5-S1 and S1 levels 100% improvement in radicular component and 50% improvement in back pain  8/10/2021 left sacroiliac joint injection with 50% pain relief  10/11/2023 left hip injection 90% pain relief postprocedure.    Patient's left hip pain is stable and well-controlled.  The patient had a history of lumbar radiculopathy in the past.  This had been stable and well-controlled.  Starting about 1 week ago the patient was sitting and when she diego from the position she had severe left low back pain rating down the posterior aspect of the leg sharp shooting in quality associated numbness tingling sensation 8-10 /10 in intensity.  This is been worsening.  Is causing difficulty with function including lower body dressing, prolonged sitting.  The patient previously had similar symptoms and this improved significantly after epidural steroid injection.  This is similar to the pain as it was previously.  This pain is severe in intensity and causing functional deficit.    She has had a provider driven and physical therapy driven home exercise program is done the exercises including the past 6 months.    Medications tried include ibuprofen, acetaminophen, flexeril. Cannot take nsaids  because of bariatric surgery.     ROS Red Flags :   Fever, Chills, Sweats: Denies  Involuntary Weight Loss: Denies  Bowel/Bladder Incontinence: Denies  Saddle Anesthesia: Denies        PMHx:   Past Medical History:   Diagnosis Date    Allergy     Anemia     with pregnancy    Anxiety     Anxiety disorder     Arthritis 12/13/2021    hips    Bowel habit changes 12/13/2021    constipation    Depression     Headache(784.0)     kid ston     pac 2009    and PVCs    PONV (postoperative nausea and vomiting) 1995    I throw up from anesthesia    Sciatica     Seizure (HCC)     Petit mal until puberty    Spinal headache 1999    Spinal headache from epidural due to childbirth    Urinary incontinence 2003    Leak urine from childbirth    Urinary tract infection, site not specified        PSHx:   Past Surgical History:   Procedure Laterality Date    GA DRAIN/INJECT LARGE JOINT/BURSA Left 10/11/2023    Procedure: Diagnostic and therapeutic Fluoroscopically guided intra-articular LEFT hip injection with sedation.       Note: plan on sedation 1.5mg versed.;  Surgeon: Bro Mojica M.D.;  [facility];  Service: Pain Management    GA LAP, KEMAR RESTRICT PROC, LONGITUDINAL GAS*  12/15/2021    Procedure: GASTRECTOMY, SLEEVE, LAPAROSCOPIC;  Surgeon: Edu Luis M.D.;  Location: [facility];  Service: General    GA INJECTION,SACROILIAC JOINT Left 8/10/2021    Procedure: LEFT sacroiliac joint injection with sedation;  Surgeon: Bro Mojica M.D.;  Location: [facility];  Service: Pain Management    LUMBAR TRANSFORAMINAL EPIDURAL STEROID INJECTION Left 11/16/2020    Procedure: INJECTION, STEROID, SPINE, LUMBAR, EPIDURAL, TRANSFORAMINAL APPROACH;  Surgeon: Bro Mojica M.D.;  [facility];  Service: Pain Management    LITHOTRIPSY  2004    OTHER  Lithotripsy wisdom teeth    OTHER      cervical polyp removed       Family history   Family History    Problem Relation Age of Onset    Arthritis Mother         Rheumatoid arthritis    Diabetes Father     Hypertension Father     Hyperlipidemia Father     Hypertension Brother     Hypertension Maternal Grandmother     Cancer Maternal Grandfather         mesothelioma from asbestos    Hypertension Paternal Grandmother     Cancer Paternal Grandfather         bone    Hypertension Paternal Grandfather          Medications:   Outpatient Medications Marked as Taking for the 12/4/23 encounter (Office Visit) with Bro Mojica M.D.   Medication Sig Dispense Refill    phentolamine mesylate (REGITINE) 5 MG Recon Soln 15 mg one time.      cyclobenzaprine (FLEXERIL) 10 mg Tab Take 1 Tablet by mouth 3 times a day as needed for Mild Pain, Moderate Pain or Muscle Spasms. 90 Tablet 3    LORazepam (ATIVAN) 1 MG Tab Take 1 mg by mouth 2 times a day as needed.      COLLAGEN PO Take  by mouth.      acetaminophen (TYLENOL) 500 MG Tab Take 500-1,000 mg by mouth every 6 hours as needed.          Current Outpatient Medications on File Prior to Visit   Medication Sig Dispense Refill    phentolamine mesylate (REGITINE) 5 MG Recon Soln 15 mg one time.      cyclobenzaprine (FLEXERIL) 10 mg Tab Take 1 Tablet by mouth 3 times a day as needed for Mild Pain, Moderate Pain or Muscle Spasms. 90 Tablet 3    LORazepam (ATIVAN) 1 MG Tab Take 1 mg by mouth 2 times a day as needed.      COLLAGEN PO Take  by mouth.      acetaminophen (TYLENOL) 500 MG Tab Take 500-1,000 mg by mouth every 6 hours as needed.      phentermine 15 MG capsule TAKE 1 CAPSULE BY MOUTH ONCE DAILY FOR 30 DAYS      docusate sodium (COLACE) 100 MG Cap Take 100 mg by mouth 2 times a day.      albuterol 108 (90 Base) MCG/ACT Aero Soln inhalation aerosol Inhale 2 Puffs every 6 hours as needed for Shortness of Breath. 8.5 g 0    promethazine-dextromethorphan (PROMETHAZINE-DM) 6.25-15 MG/5ML syrup Take 5 mL by mouth 4 times a day as needed for Cough. (Patient not taking: Reported on  "2021) 120 mL 0    Ascorbic Acid (VITAMIN C) 1000 MG Tab Take  by mouth.      losartan (COZAAR) 50 MG Tab Take 50 mg by mouth every day. (Patient not taking: Reported on 2023)      Cholecalciferol (VITAMIN D3 PO) Take 50,000 Units by mouth. Once a week      progesterone (PROMETRIUM) 200 MG capsule TAKE 1 CAPSULE BY MOUTH ONCE DAILY FOR 12 DAYS EVERY 3 4 MONTHS (Patient not taking: No sig reported)      LORAZEPAM PO Take  by mouth.       No current facility-administered medications on file prior to visit.         Allergies:   Allergies   Allergen Reactions    Diagnostic X-Ray Materials Shortness of Breath    Contrast Media With Iodine [Iodine]      Pt reports becoming SOB with contrast media with iodine in the past    Gabapentin      Cognitive side effects     Other Drug      \"some epilepsy medications, unsure of name\"       Social Hx:   Social History     Socioeconomic History    Marital status:      Spouse name: Not on file    Number of children: Not on file    Years of education: Not on file    Highest education level: Not on file   Occupational History    Not on file   Tobacco Use    Smoking status: Former     Current packs/day: 0.00     Types: Cigarettes     Quit date:      Years since quittin.9    Smokeless tobacco: Never    Tobacco comments:     Socially twice a month now   Vaping Use    Vaping Use: Never used   Substance and Sexual Activity    Alcohol use: No    Drug use: No    Sexual activity: Yes     Partners: Male     Birth control/protection: Rhythm   Other Topics Concern     Service No    Blood Transfusions No    Caffeine Concern No    Occupational Exposure No    Hobby Hazards No    Sleep Concern Yes    Stress Concern No    Weight Concern Yes    Special Diet No    Back Care Yes    Exercise No    Bike Helmet No    Seat Belt Yes    Self-Exams Yes   Social History Narrative    Not on file     Social Determinants of Health     Financial Resource Strain: Not on file   Food " Insecurity: Not on file   Transportation Needs: Not on file   Physical Activity: Not on file   Stress: Not on file   Social Connections: Not on file   Intimate Partner Violence: Not on file   Housing Stability: Not on file         EXAMINATION     Physical Exam:   Vitals: /80 (BP Location: Right arm, Patient Position: Sitting, BP Cuff Size: Adult)   Pulse 70   Temp 36.1 °C (97 °F) (Temporal)   Wt 104 kg (229 lb 4.5 oz)   SpO2 97%     Constitutional:   Body Habitus: Body mass index is 33.86 kg/m².  Cooperation: Fully cooperates with exam  Appearance: Well-groomed no disheveled    Respiratory-  breathing comfortable on room air, no audible wheezing  Cardiovascular- capillary refills less than 2 seconds. No lower extremity edema is noted.   Psychiatric- alert and oriented ×3. Normal affect.    MSK and Neuro: -      Thoracic/Lumbar Spine/Sacral Spine/Hips   There are no signs of infection around the injection sites.   decreased active range of motion with flexion, lateral flexion, and rotation bilaterally.   There is decreased active range of motion with lumbar extension.      Palpation:   No tenderness to palpation in midline at T1-T12 levels. No tenderness to palpation in the left and right of the midline T1-L5, NEGATIVE for tenderness to palpation to the para-midline region in the lower lumbar levels.  palpation over SI joint: negative bilaterally    palpation in hip or over the gluteus medius tendon insertion: negative bilaterally      Lumbar spine Special tests  Neuro tension  Straight leg test negative right, positive left    Slump test negative right, positive left      Key points for the international standards for neurological classification of spinal cord injury (ISNCSCI) to light touch.     Dermatome R L                                      L2 2 2   L3 2 2   L4 2 2   L5 2 1   S1 2 2   S2 2 2         Motor Exam Lower Extremities    ? Myotome R L   Hip flexion L2 5 5   Knee extension L3 5 5   Ankle  "dorsiflexion L4 5 5-   Toe extension L5 5 4   Ankle plantarflexion S1 5 5-             MEDICAL DECISION MAKING    DATA    Labs:   Lab Results   Component Value Date/Time    SODIUM 142 12/17/2021 03:17 AM    POTASSIUM 4.1 12/17/2021 03:17 AM    CHLORIDE 109 12/17/2021 03:17 AM    CO2 20 12/17/2021 03:17 AM    GLUCOSE 106 (H) 12/17/2021 03:17 AM    BUN 8 12/17/2021 03:17 AM    CREATININE 0.44 (L) 12/17/2021 03:17 AM        Lab Results   Component Value Date/Time    PROTHROMBTM 14.0 12/13/2021 09:44 AM    INR 1.11 12/13/2021 09:44 AM        Lab Results   Component Value Date/Time    WBC 11.1 (H) 12/17/2021 03:17 AM    RBC 4.31 12/17/2021 03:17 AM    HEMOGLOBIN 12.8 12/17/2021 03:17 AM    HEMATOCRIT 38.9 12/17/2021 03:17 AM    MCV 90.3 12/17/2021 03:17 AM    MCH 29.7 12/17/2021 03:17 AM    MCHC 32.9 (L) 12/17/2021 03:17 AM    MPV 10.9 12/17/2021 03:17 AM        No results found for: \"HBA1C\"       Imaging:   I personally reviewed following images  X-ray hips 8/4/2021 with significant prominence of the anterolateral portion of the head neck junction of the bilateral hips consistent with hip impingement syndrome.    I reviewed the fluoroscopic images from 11/16/2020 showing successful left L5-S1 and S1 transforaminal epidural steroid injection.  I reviewed these with the patient on 12/14/2020.    MRI lumbar spine dated 10/16/2020  At L5-S1 there is a large left paracentral disc herniation with impingement on the descending left S1 nerve root.  At L4-5 there is a small disc protrusion with mild left neuroforaminal stenosis.    I reviewed the following radiology reports    XR hip                 Results for orders placed in visit on 10/16/20   MR-LUMBAR SPINE-W/O                                                                                                                                                                                                      Results for orders placed during the hospital encounter of 03/16/20 "   DX-LUMBAR SPINE-2 OR 3 VIEWS    Impression No significant spondylosis. No acute fracture or listhesis.                                           DIAGNOSIS   Visit Diagnoses     ICD-10-CM   1. Lumbar radiculopathy  M54.16   2. Left leg pain  M79.605   3. Left leg numbness  R20.0   4. Left hip impingement syndrome  M25.852   5. Chronic left hip pain  M25.552    G89.29   6. Arthritis of hip  M16.10   7. Chronic left-sided low back pain with left-sided sciatica  M54.42    G89.29   8. BMI 30-39.9  E66.9   9. Exercise counseling  Z71.82   10. Impaired mobility and ADLs  Z74.09    Z78.9           ASSESSMENT and PLAN:     José Luis Marinelli Bere  1975 female      José Luis Marinelli was seen today for follow-up.    Diagnoses and all orders for this visit:    Lumbar radiculopathy    Left leg pain    Left leg numbness    Left hip impingement syndrome    Chronic left hip pain    Arthritis of hip    Chronic left-sided low back pain with left-sided sciatica    BMI 30-39.9    Exercise counseling    Impaired mobility and ADLs      The patient has no red flag signs on today's exam.  Specifically the patient has no saddle anesthesia, bowel incontinence, bladder incontinence.  We discussed emergency precautions. The patient understands emergency precautions.       The patient continues to get pain relief from the hip injection and there is no pain in the hip area.  She is having a recurrence of lumbar radiculopathy with a severe flare of pain with functional deficits.  This is mostly of a left L5 distribution.    The last MRI of the lumbar spine was in .    I have ordered an MRI of the lumbar spine.    After the MRI of the lumbar spine we will plan to proceed with a left L5-S1 transforaminal epidural steroid injection.  This procedure may be adjusted depending on the results of the MRI of the lumbar spine.    The risks benefits and alternatives to this procedure were discussed and the patient wishes to proceed with the procedure.  Risks include but are not limited to damage to surrounding structures, infection, bleeding, worsening of pain which can be permanent, weakness which can be permanent. Benefits include pain relief, improved function. Alternatives includes not doing the procedure.      Medications: Acetaminophen up to 1000 mg 3 times daily as needed not to exceed 3000 mg per 24-hours.  Continue Flexeril as needed    Follow up: 1 year or sooner as needed    Thank you for allowing me to participate in the care of this patient. If you have any questions please not hesitate to contact me.             Please note that this dictation was created using voice recognition software. I have made every reasonable attempt to correct obvious errors but there may be errors of grammar and content that I may have overlooked prior to finalization of this note.      Bro Mojica MD  Interventional Spine and Sports Physiatry  Physical Medicine and Rehabilitation  [facility]

## 2023-12-04 NOTE — PROGRESS NOTES
Follow up patient note  Interventional spine and sports physiatry, Physical medicine rehabilitation      Chief complaint:   Chief Complaint   Patient presents with    Follow-Up     Left hip impingement syndrome          HISTORY    Please see new patient note by Dr Mojica,  for more details.     HPI  Patient identification: José Luis Blackburn ,  1975,   With Diagnoses of Lumbar radiculopathy, Left leg pain, Left leg numbness, Left hip impingement syndrome, Chronic left hip pain, Arthritis of hip, Chronic left-sided low back pain with left-sided sciatica, BMI 30-39.9, Exercise counseling, and Impaired mobility and ADLs were pertinent to this visit.     Procedures:  2020 left Lumbar Transforaminal Epidural Steroid  at the L5-S1 and S1 levels 100% improvement in radicular component and 50% improvement in back pain  8/10/2021 left sacroiliac joint injection with 50% pain relief  10/11/2023 left hip injection 90% pain relief postprocedure.    Patient's left hip pain is stable and well-controlled.  The patient had a history of lumbar radiculopathy in the past.  This had been stable and well-controlled.  Starting about 1 week ago the patient was sitting and when she diego from the position she had severe left low back pain rating down the posterior aspect of the leg sharp shooting in quality associated numbness tingling sensation 8-10 /10 in intensity.  This is been worsening.  Is causing difficulty with function including lower body dressing, prolonged sitting.  The patient previously had similar symptoms and this improved significantly after epidural steroid injection.  This is similar to the pain as it was previously.  This pain is severe in intensity and causing functional deficit.    She has had a provider driven and physical therapy driven home exercise program is done the exercises including the past 6 months.    Medications tried include ibuprofen, acetaminophen, flexeril. Cannot take nsaids  because of bariatric surgery.     ROS Red Flags :   Fever, Chills, Sweats: Denies  Involuntary Weight Loss: Denies  Bowel/Bladder Incontinence: Denies  Saddle Anesthesia: Denies        PMHx:   Past Medical History:   Diagnosis Date    Allergy     Anemia     with pregnancy    Anxiety     Anxiety disorder     Arthritis 12/13/2021    hips    Bowel habit changes 12/13/2021    constipation    Depression     Headache(784.0)     kid ston     pac 2009    and PVCs    PONV (postoperative nausea and vomiting) 1995    I throw up from anesthesia    Sciatica     Seizure (HCC)     Petit mal until puberty    Spinal headache 1999    Spinal headache from epidural due to childbirth    Urinary incontinence 2003    Leak urine from childbirth    Urinary tract infection, site not specified        PSHx:   Past Surgical History:   Procedure Laterality Date    MA DRAIN/INJECT LARGE JOINT/BURSA Left 10/11/2023    Procedure: Diagnostic and therapeutic Fluoroscopically guided intra-articular LEFT hip injection with sedation.       Note: plan on sedation 1.5mg versed.;  Surgeon: Bro Mojica M.D.;  [facility];  Service: Pain Management    MA LAP, KEMAR RESTRICT PROC, LONGITUDINAL GAS*  12/15/2021    Procedure: GASTRECTOMY, SLEEVE, LAPAROSCOPIC;  Surgeon: Edu Luis M.D.;  Location: SURGERY [facility];  Service: General    MA INJECTION,SACROILIAC JOINT Left 8/10/2021    Procedure: LEFT sacroiliac joint injection with sedation;  Surgeon: Bro Mojica M.D.;  Location: [facility];  Service: Pain Management    LUMBAR TRANSFORAMINAL EPIDURAL STEROID INJECTION Left 11/16/2020    Procedure: INJECTION, STEROID, SPINE, LUMBAR, EPIDURAL, TRANSFORAMINAL APPROACH;  Surgeon: Bro Mojica M.D.;  Location: [facility];  Service: Pain Management    LITHOTRIPSY  2004    OTHER  Lithotripsy wisdom teeth    OTHER      cervical polyp removed       Family history   Family History    Problem Relation Age of Onset    Arthritis Mother         Rheumatoid arthritis    Diabetes Father     Hypertension Father     Hyperlipidemia Father     Hypertension Brother     Hypertension Maternal Grandmother     Cancer Maternal Grandfather         mesothelioma from asbestos    Hypertension Paternal Grandmother     Cancer Paternal Grandfather         bone    Hypertension Paternal Grandfather          Medications:   Outpatient Medications Marked as Taking for the 12/4/23 encounter (Office Visit) with Bro Mojica M.D.   Medication Sig Dispense Refill    phentolamine mesylate (REGITINE) 5 MG Recon Soln 15 mg one time.      cyclobenzaprine (FLEXERIL) 10 mg Tab Take 1 Tablet by mouth 3 times a day as needed for Mild Pain, Moderate Pain or Muscle Spasms. 90 Tablet 3    LORazepam (ATIVAN) 1 MG Tab Take 1 mg by mouth 2 times a day as needed.      COLLAGEN PO Take  by mouth.      acetaminophen (TYLENOL) 500 MG Tab Take 500-1,000 mg by mouth every 6 hours as needed.          Current Outpatient Medications on File Prior to Visit   Medication Sig Dispense Refill    phentolamine mesylate (REGITINE) 5 MG Recon Soln 15 mg one time.      cyclobenzaprine (FLEXERIL) 10 mg Tab Take 1 Tablet by mouth 3 times a day as needed for Mild Pain, Moderate Pain or Muscle Spasms. 90 Tablet 3    LORazepam (ATIVAN) 1 MG Tab Take 1 mg by mouth 2 times a day as needed.      COLLAGEN PO Take  by mouth.      acetaminophen (TYLENOL) 500 MG Tab Take 500-1,000 mg by mouth every 6 hours as needed.      phentermine 15 MG capsule TAKE 1 CAPSULE BY MOUTH ONCE DAILY FOR 30 DAYS      docusate sodium (COLACE) 100 MG Cap Take 100 mg by mouth 2 times a day.      albuterol 108 (90 Base) MCG/ACT Aero Soln inhalation aerosol Inhale 2 Puffs every 6 hours as needed for Shortness of Breath. 8.5 g 0    promethazine-dextromethorphan (PROMETHAZINE-DM) 6.25-15 MG/5ML syrup Take 5 mL by mouth 4 times a day as needed for Cough. (Patient not taking: Reported on  "2021) 120 mL 0    Ascorbic Acid (VITAMIN C) 1000 MG Tab Take  by mouth.      losartan (COZAAR) 50 MG Tab Take 50 mg by mouth every day. (Patient not taking: Reported on 2023)      Cholecalciferol (VITAMIN D3 PO) Take 50,000 Units by mouth. Once a week      progesterone (PROMETRIUM) 200 MG capsule TAKE 1 CAPSULE BY MOUTH ONCE DAILY FOR 12 DAYS EVERY 3 4 MONTHS (Patient not taking: No sig reported)      LORAZEPAM PO Take  by mouth.       No current facility-administered medications on file prior to visit.         Allergies:   Allergies   Allergen Reactions    Diagnostic X-Ray Materials Shortness of Breath    Contrast Media With Iodine [Iodine]      Pt reports becoming SOB with contrast media with iodine in the past    Gabapentin      Cognitive side effects     Other Drug      \"some epilepsy medications, unsure of name\"       Social Hx:   Social History     Socioeconomic History    Marital status:      Spouse name: Not on file    Number of children: Not on file    Years of education: Not on file    Highest education level: Not on file   Occupational History    Not on file   Tobacco Use    Smoking status: Former     Current packs/day: 0.00     Types: Cigarettes     Quit date:      Years since quittin.9    Smokeless tobacco: Never    Tobacco comments:     Socially twice a month now   Vaping Use    Vaping Use: Never used   Substance and Sexual Activity    Alcohol use: No    Drug use: No    Sexual activity: Yes     Partners: Male     Birth control/protection: Rhythm   Other Topics Concern     Service No    Blood Transfusions No    Caffeine Concern No    Occupational Exposure No    Hobby Hazards No    Sleep Concern Yes    Stress Concern No    Weight Concern Yes    Special Diet No    Back Care Yes    Exercise No    Bike Helmet No    Seat Belt Yes    Self-Exams Yes   Social History Narrative    Not on file     Social Determinants of Health     Financial Resource Strain: Not on file   Food " Insecurity: Not on file   Transportation Needs: Not on file   Physical Activity: Not on file   Stress: Not on file   Social Connections: Not on file   Intimate Partner Violence: Not on file   Housing Stability: Not on file         EXAMINATION     Physical Exam:   Vitals: /80 (BP Location: Right arm, Patient Position: Sitting, BP Cuff Size: Adult)   Pulse 70   Temp 36.1 °C (97 °F) (Temporal)   Wt 104 kg (229 lb 4.5 oz)   SpO2 97%     Constitutional:   Body Habitus: Body mass index is 33.86 kg/m².  Cooperation: Fully cooperates with exam  Appearance: Well-groomed no disheveled    Respiratory-  breathing comfortable on room air, no audible wheezing  Cardiovascular- capillary refills less than 2 seconds. No lower extremity edema is noted.   Psychiatric- alert and oriented ×3. Normal affect.    MSK and Neuro: -      Thoracic/Lumbar Spine/Sacral Spine/Hips   There are no signs of infection around the injection sites.   decreased active range of motion with flexion, lateral flexion, and rotation bilaterally.   There is decreased active range of motion with lumbar extension.      Palpation:   No tenderness to palpation in midline at T1-T12 levels. No tenderness to palpation in the left and right of the midline T1-L5, NEGATIVE for tenderness to palpation to the para-midline region in the lower lumbar levels.  palpation over SI joint: negative bilaterally    palpation in hip or over the gluteus medius tendon insertion: negative bilaterally      Lumbar spine Special tests  Neuro tension  Straight leg test negative right, positive left    Slump test negative right, positive left      Key points for the international standards for neurological classification of spinal cord injury (ISNCSCI) to light touch.     Dermatome R L                                      L2 2 2   L3 2 2   L4 2 2   L5 2 1   S1 2 2   S2 2 2         Motor Exam Lower Extremities    ? Myotome R L   Hip flexion L2 5 5   Knee extension L3 5 5   Ankle  "dorsiflexion L4 5 5-   Toe extension L5 5 4   Ankle plantarflexion S1 5 5-             MEDICAL DECISION MAKING    DATA    Labs:   Lab Results   Component Value Date/Time    SODIUM 142 12/17/2021 03:17 AM    POTASSIUM 4.1 12/17/2021 03:17 AM    CHLORIDE 109 12/17/2021 03:17 AM    CO2 20 12/17/2021 03:17 AM    GLUCOSE 106 (H) 12/17/2021 03:17 AM    BUN 8 12/17/2021 03:17 AM    CREATININE 0.44 (L) 12/17/2021 03:17 AM        Lab Results   Component Value Date/Time    PROTHROMBTM 14.0 12/13/2021 09:44 AM    INR 1.11 12/13/2021 09:44 AM        Lab Results   Component Value Date/Time    WBC 11.1 (H) 12/17/2021 03:17 AM    RBC 4.31 12/17/2021 03:17 AM    HEMOGLOBIN 12.8 12/17/2021 03:17 AM    HEMATOCRIT 38.9 12/17/2021 03:17 AM    MCV 90.3 12/17/2021 03:17 AM    MCH 29.7 12/17/2021 03:17 AM    MCHC 32.9 (L) 12/17/2021 03:17 AM    MPV 10.9 12/17/2021 03:17 AM        No results found for: \"HBA1C\"       Imaging:   I personally reviewed following images  X-ray hips 8/4/2021 with significant prominence of the anterolateral portion of the head neck junction of the bilateral hips consistent with hip impingement syndrome.    I reviewed the fluoroscopic images from 11/16/2020 showing successful left L5-S1 and S1 transforaminal epidural steroid injection.  I reviewed these with the patient on 12/14/2020.    MRI lumbar spine dated 10/16/2020  At L5-S1 there is a large left paracentral disc herniation with impingement on the descending left S1 nerve root.  At L4-5 there is a small disc protrusion with mild left neuroforaminal stenosis.    I reviewed the following radiology reports    XR hip                 Results for orders placed in visit on 10/16/20   MR-LUMBAR SPINE-W/O                                                                                                                                                                                                      Results for orders placed during the hospital encounter of 03/16/20 "   DX-LUMBAR SPINE-2 OR 3 VIEWS    Impression No significant spondylosis. No acute fracture or listhesis.                                           DIAGNOSIS   Visit Diagnoses     ICD-10-CM   1. Lumbar radiculopathy  M54.16   2. Left leg pain  M79.605   3. Left leg numbness  R20.0   4. Left hip impingement syndrome  M25.852   5. Chronic left hip pain  M25.552    G89.29   6. Arthritis of hip  M16.10   7. Chronic left-sided low back pain with left-sided sciatica  M54.42    G89.29   8. BMI 30-39.9  E66.9   9. Exercise counseling  Z71.82   10. Impaired mobility and ADLs  Z74.09    Z78.9           ASSESSMENT and PLAN:     José Luis Marinelli Bere  1975 female      José Luis Marinelli was seen today for follow-up.    Diagnoses and all orders for this visit:    Lumbar radiculopathy    Left leg pain    Left leg numbness    Left hip impingement syndrome    Chronic left hip pain    Arthritis of hip    Chronic left-sided low back pain with left-sided sciatica    BMI 30-39.9    Exercise counseling    Impaired mobility and ADLs      The patient has no red flag signs on today's exam.  Specifically the patient has no saddle anesthesia, bowel incontinence, bladder incontinence.  We discussed emergency precautions. The patient understands emergency precautions.       The patient continues to get pain relief from the hip injection and there is no pain in the hip area.  She is having a recurrence of lumbar radiculopathy with a severe flare of pain with functional deficits.  This is mostly of a left L5 distribution.    The last MRI of the lumbar spine was in .    I have ordered an MRI of the lumbar spine.    After the MRI of the lumbar spine we will plan to proceed with a left L5-S1 transforaminal epidural steroid injection.  This procedure may be adjusted depending on the results of the MRI of the lumbar spine.    The risks benefits and alternatives to this procedure were discussed and the patient wishes to proceed with the procedure.  Risks include but are not limited to damage to surrounding structures, infection, bleeding, worsening of pain which can be permanent, weakness which can be permanent. Benefits include pain relief, improved function. Alternatives includes not doing the procedure.      Medications: Acetaminophen up to 1000 mg 3 times daily as needed not to exceed 3000 mg per 24-hours.  Continue Flexeril as needed    Follow up: 1 year or sooner as needed    Thank you for allowing me to participate in the care of this patient. If you have any questions please not hesitate to contact me.             Please note that this dictation was created using voice recognition software. I have made every reasonable attempt to correct obvious errors but there may be errors of grammar and content that I may have overlooked prior to finalization of this note.      Bro Mojica MD  Interventional Spine and Sports Physiatry  Physical Medicine and Rehabilitation  [facility]

## 2023-12-08 ENCOUNTER — HOSPITAL ENCOUNTER (OUTPATIENT)
Dept: RADIOLOGY | Facility: MEDICAL CENTER | Age: 48
End: 2023-12-08
Attending: PHYSICAL MEDICINE & REHABILITATION
Payer: COMMERCIAL

## 2023-12-08 DIAGNOSIS — M79.605 LEFT LEG PAIN: ICD-10-CM

## 2023-12-08 DIAGNOSIS — M54.16 LUMBAR RADICULOPATHY: ICD-10-CM

## 2023-12-08 DIAGNOSIS — R20.0 LEFT LEG NUMBNESS: ICD-10-CM

## 2023-12-08 PROCEDURE — 72148 MRI LUMBAR SPINE W/O DYE: CPT

## 2023-12-13 ENCOUNTER — HOSPITAL ENCOUNTER (OUTPATIENT)
Facility: REHABILITATION | Age: 48
End: 2023-12-13
Attending: PHYSICAL MEDICINE & REHABILITATION | Admitting: PHYSICAL MEDICINE & REHABILITATION
Payer: COMMERCIAL

## 2023-12-13 ENCOUNTER — APPOINTMENT (OUTPATIENT)
Dept: RADIOLOGY | Facility: REHABILITATION | Age: 48
End: 2023-12-13
Attending: PHYSICAL MEDICINE & REHABILITATION
Payer: COMMERCIAL

## 2023-12-13 VITALS
HEIGHT: 69 IN | WEIGHT: 232.14 LBS | BODY MASS INDEX: 34.38 KG/M2 | OXYGEN SATURATION: 98 % | DIASTOLIC BLOOD PRESSURE: 85 MMHG | RESPIRATION RATE: 16 BRPM | TEMPERATURE: 98.8 F | SYSTOLIC BLOOD PRESSURE: 138 MMHG | HEART RATE: 75 BPM

## 2023-12-13 PROCEDURE — 700117 HCHG RX CONTRAST REV CODE 255

## 2023-12-13 PROCEDURE — 700111 HCHG RX REV CODE 636 W/ 250 OVERRIDE (IP): Mod: JZ

## 2023-12-13 PROCEDURE — 64483 NJX AA&/STRD TFRM EPI L/S 1: CPT

## 2023-12-13 PROCEDURE — 99152 MOD SED SAME PHYS/QHP 5/>YRS: CPT

## 2023-12-13 PROCEDURE — A9579 GAD-BASE MR CONTRAST NOS,1ML: HCPCS

## 2023-12-13 RX ORDER — DEXAMETHASONE SODIUM PHOSPHATE 10 MG/ML
INJECTION, SOLUTION INTRAMUSCULAR; INTRAVENOUS
Status: COMPLETED
Start: 2023-12-13 | End: 2023-12-13

## 2023-12-13 RX ORDER — LIDOCAINE HYDROCHLORIDE 10 MG/ML
INJECTION, SOLUTION EPIDURAL; INFILTRATION; INTRACAUDAL; PERINEURAL
Status: COMPLETED
Start: 2023-12-13 | End: 2023-12-13

## 2023-12-13 RX ORDER — MIDAZOLAM HYDROCHLORIDE 1 MG/ML
INJECTION INTRAMUSCULAR; INTRAVENOUS
Status: COMPLETED
Start: 2023-12-13 | End: 2023-12-13

## 2023-12-13 RX ADMIN — FENTANYL CITRATE 50 MCG: 50 INJECTION, SOLUTION INTRAMUSCULAR; INTRAVENOUS at 09:43

## 2023-12-13 RX ADMIN — LIDOCAINE HYDROCHLORIDE 10 ML: 10 INJECTION, SOLUTION EPIDURAL; INFILTRATION; INTRACAUDAL; PERINEURAL at 09:45

## 2023-12-13 RX ADMIN — GADOTERIDOL 10 ML: 279.3 INJECTION, SOLUTION INTRAVENOUS at 09:49

## 2023-12-13 RX ADMIN — MIDAZOLAM HYDROCHLORIDE 1 MG: 1 INJECTION, SOLUTION INTRAMUSCULAR; INTRAVENOUS at 09:43

## 2023-12-13 RX ADMIN — DEXAMETHASONE SODIUM PHOSPHATE 10 MG: 10 INJECTION, SOLUTION INTRAMUSCULAR; INTRAVENOUS at 09:49

## 2023-12-13 ASSESSMENT — FIBROSIS 4 INDEX: FIB4 SCORE: 1.17

## 2023-12-13 ASSESSMENT — PAIN DESCRIPTION - PAIN TYPE
TYPE: CHRONIC PAIN
TYPE: CHRONIC PAIN
TYPE: ACUTE PAIN

## 2023-12-13 NOTE — OP REPORT
Date of Service: 12/13/2023     Patient: José Luis Blackburn 48 y.o. female     MRN: [id number]     Physician/s: Bro Mojica MD    Pre-operative Diagnosis: Lumbar radiculopathy    Post-operative Diagnosis: Lumbar radiculopathy    Procedure: left Lumbar Transforaminal Epidural Steroid  at the L5-S1 levels with sedation    Description of procedure:    The risks, benefits, and alternatives of the procedure were reviewed and discussed with the patient.  Written informed consent was freely obtained. A pre-procedural time-out was conducted by the physician verifying patient’s identity, procedure to be performed, procedure site and side, and allergy verification. Appropriate equipment was determined to be in place for the procedure.     Moderation sedation was achieved with Versed (1mg) and Fentanyl (50mcg). Monitoring of the patients vital signs and respiratory status was provided by trained independent registered nurse during the entire course of the procedures and under my supervision and recoded in the patient’s medical record. The duration of sedation was over 10 minutes.     The patient's vital signs were carefully monitored before, throughout, and after the procedure.     In the fluoroscopy suite the patient was placed in a prone position, a pillow placed underneath their umbilicus. The skin was prepped and draped in the usual sterile fashion.     The fluoroscope was placed over the lumbar spine and adjusted into the proper AP/Oblique view to enter the transforaminal space just adjacent to the pedicle at the levels below. The targets for injection were then marked at the left L5-S1. A 27g 1.5 inch needle was placed into the marked site, and 1mL of 1% Lidocaine was injected subcutaneously into the epidermal and dermal layers. The needle was removed intact.  A 22g 5 inch spinal needle was then placed and advanced under fluoroscopic guidance in an oblique view towards the epidural space of the levels noted above.  The needle position was confirmed to not be past the 6 o'clock position in the AP view and it was in the neuroforamen in the lateral view.     Under live fluoroscopic guidance in the AP view, contrast dye was used to highlight the epidural space spread of each level above. Final fluoroscopic images were saved.  Following negative aspiration, 1mL of 1% lidocaine preservative free with 10mg dexamethasone   was then injected at each level above, and the needles were removed intact after restyleted. The patient's back was covered with a 4x4 gauze, the area was cleansed with sterile normal saline, and a dressing was applied. There were no complications noted.     This was a challenging procedure given the patients Body mass index is 34.28 kg/m².. This required longer needles.  A typical procedure such as this would require 25g 3.5 inch needles.  This procedure required 22g 5 inch needles.  This added to the mental effort for complexity this procedure.  This also made acquiring fluoroscopic images more time-consuming and challenging.  Final fluoroscopic images were obtained and saved.     The patient was then evaluated post-procedure, and was hemodynamically stable prior to leaving the post-operative care unit.     The patient had 50% pain relief postprocedure  Preprocedural pain: 6/10 NRS  Postprocedural pain: 3/10 NRS    Follow-up as scheduled    Bro Mojica MD  Interventional Spine and Pain  Physical Medicine and Rehabilitation  [facility]          CPT codes  Transforaminal epidural injection- lumbar or sacral (first level):  12912-45  moderate procedural sedation first 15 minutes: [id number]

## 2023-12-13 NOTE — PROGRESS NOTES
0866 Pt received to pre procedure area. ID band and allergies verified. Vital signs taken and stable. Verified that patient has not taken NSAIDS, anticoagulants or blood thinners in past 5 days. Pt's history reviewed. Reviewed post op instructions with patient, questions answered, verbalized understanding. Pt seen by Dr. Leavitt  pre procedure discussed, questions answered.     9493 Pt received to recovery area, report received from procedure RN Rosa . Vitals taken and stable. Patient tolerated po fluids and snack without difficulty. Dressing clean, dry and intact. Ice pack applied over dressing.     1015 Pt seen by Dr. Mojica post procedure, orders received for discharge. Patient ambulatory without difficulty. Pt discharged to designated .

## 2023-12-13 NOTE — INTERVAL H&P NOTE
H&P reviewed. The patient was examined and there are no changes to the H&P     New MRI lumbar spine reviewed.  Patient's symptoms reviewed with radiating pain down the leg through the gastrocnemius towards the dorsal aspect is likely the first second webspace.  Will proceed with the procedure as well.    Lungs clear to auscultation bilaterally.  No abdominal tenderness.  Heart regular rate and rhythm.  Vitals reviewed.    Proceed with the originally scheduled procedure.  The risks, benefits and alternatives were discussed.  The patient understands these.    Pre-Op Diagnosis Codes:     * Lumbar radiculopathy [M54.16]  Procedure(s):  LEFT L5-S1 transforaminal epidural steroid injection with fluoroscopic guidance.      Bro Mojica MD  Physical Medicine and Rehabilitation  Interventional Spine and Sports Physiatry  [facility]

## 2023-12-13 NOTE — OR SURGEON
Immediate Post OP Note    Pre-Op Diagnosis Codes:     * Lumbar radiculopathy [M54.16]      Post-Op Diagnosis Codes:     * Lumbar radiculopathy [M54.16]      Procedure(s):  LEFT L5-S1 transforaminal epidural steroid injection with fluoroscopic guidance.  Sedation 1 mg Versed and 50 mcg fentanyl- Wound Class: Clean    Surgeon(s):  Bro Mojica M.D.    Anesthesiologist/Type of Anesthesia:  No anesthesia staff entered./Local    Surgical Staff:  Circulator: Rosa Pinzon R.N.  Scrub Person: Rema Cullen  Radiology Technologist: Saloni Butts    Specimens removed if any:  * No specimens in log *    Estimated Blood Loss: None    Findings: None    Complications: None        12/13/2023 9:50 AM Bro Mojica M.D.

## 2023-12-14 ENCOUNTER — TELEPHONE (OUTPATIENT)
Dept: PHYSICAL MEDICINE AND REHAB | Facility: MEDICAL CENTER | Age: 48
End: 2023-12-14
Payer: COMMERCIAL

## 2023-12-14 NOTE — TELEPHONE ENCOUNTER
Called for Post -SP check up: LEFT L5-S1 transforaminal epidural steroid injection with fluoroscopic guidance     Change in pain: yes    Concerns? No     Confirmed [facility] appt?  yes

## 2024-01-29 ENCOUNTER — OFFICE VISIT (OUTPATIENT)
Dept: PHYSICAL MEDICINE AND REHAB | Facility: MEDICAL CENTER | Age: 49
End: 2024-01-29
Payer: COMMERCIAL

## 2024-01-29 VITALS
TEMPERATURE: 97.3 F | HEART RATE: 72 BPM | BODY MASS INDEX: 34.63 KG/M2 | SYSTOLIC BLOOD PRESSURE: 118 MMHG | HEIGHT: 69 IN | WEIGHT: 233.8 LBS | OXYGEN SATURATION: 98 % | DIASTOLIC BLOOD PRESSURE: 72 MMHG

## 2024-01-29 DIAGNOSIS — M25.552 CHRONIC LEFT HIP PAIN: ICD-10-CM

## 2024-01-29 DIAGNOSIS — E66.9 OBESITY (BMI 30-39.9): ICD-10-CM

## 2024-01-29 DIAGNOSIS — Z74.09 IMPAIRED MOBILITY AND ADLS: ICD-10-CM

## 2024-01-29 DIAGNOSIS — M54.16 LUMBAR RADICULOPATHY: ICD-10-CM

## 2024-01-29 DIAGNOSIS — M54.42 CHRONIC LEFT-SIDED LOW BACK PAIN WITH LEFT-SIDED SCIATICA: ICD-10-CM

## 2024-01-29 DIAGNOSIS — M25.852 LEFT HIP IMPINGEMENT SYNDROME: ICD-10-CM

## 2024-01-29 DIAGNOSIS — Z78.9 IMPAIRED MOBILITY AND ADLS: ICD-10-CM

## 2024-01-29 DIAGNOSIS — M16.10 ARTHRITIS OF HIP: ICD-10-CM

## 2024-01-29 DIAGNOSIS — G89.29 CHRONIC LEFT-SIDED LOW BACK PAIN WITH LEFT-SIDED SCIATICA: ICD-10-CM

## 2024-01-29 DIAGNOSIS — Z71.82 EXERCISE COUNSELING: ICD-10-CM

## 2024-01-29 DIAGNOSIS — G89.29 CHRONIC LEFT HIP PAIN: ICD-10-CM

## 2024-01-29 PROCEDURE — 3074F SYST BP LT 130 MM HG: CPT | Performed by: PHYSICAL MEDICINE & REHABILITATION

## 2024-01-29 PROCEDURE — 3078F DIAST BP <80 MM HG: CPT | Performed by: PHYSICAL MEDICINE & REHABILITATION

## 2024-01-29 PROCEDURE — 99214 OFFICE O/P EST MOD 30 MIN: CPT | Performed by: PHYSICAL MEDICINE & REHABILITATION

## 2024-01-29 PROCEDURE — 1125F AMNT PAIN NOTED PAIN PRSNT: CPT | Performed by: PHYSICAL MEDICINE & REHABILITATION

## 2024-01-29 ASSESSMENT — PATIENT HEALTH QUESTIONNAIRE - PHQ9: CLINICAL INTERPRETATION OF PHQ2 SCORE: 0

## 2024-01-29 ASSESSMENT — PAIN SCALES - GENERAL: PAINLEVEL: 2=MINIMAL-SLIGHT

## 2024-01-29 NOTE — PROGRESS NOTES
Follow up patient note  Interventional spine and sports physiatry, Physical medicine rehabilitation      Chief complaint:   Chief Complaint   Patient presents with    Follow-Up     Back pain          HISTORY    Please see new patient note by Dr Mojica,  for more details.     HPI  Patient identification: José Luis Blackburn ,  1975,   With Diagnoses of Lumbar radiculopathy, Left hip impingement syndrome, Chronic left hip pain, Arthritis of hip, Chronic left-sided low back pain with left-sided sciatica, BMI 30-39.9, Exercise counseling, and Impaired mobility and ADLs were pertinent to this visit.     Procedures:  2020 left Lumbar Transforaminal Epidural Steroid  at the L5-S1 and S1 levels 100% improvement in radicular component and 50% improvement in back pain  8/10/2021 left sacroiliac joint injection with 50% pain relief  10/11/2023 left hip injection 90% pain relief postprocedure.  2023 left L5-S1 transforaminal epidural steroid injection with sedation 90% pain relief    She is now able to ride her bicycle and is exercising. She is using a stand up desk at work.  The back pain is now minimal 1 out of 10 in intensity NRS.  Preprocedure pain was 10/10 NRS.  She is very happy with the results of this.  She is returned to exercise and she is lifting light weights.  She is able to do all her ADLs with no difficulty.  She denies numbness tingling or weakness.        She has had a provider driven and physical therapy driven home exercise program is done the exercises including the past 6 months.    Medications tried include ibuprofen, acetaminophen, flexeril. Cannot take nsaids because of bariatric surgery.     ROS Red Flags :   Fever, Chills, Sweats: Denies  Involuntary Weight Loss: Denies  Bowel/Bladder Incontinence: Denies  Saddle Anesthesia: Denies        PMHx:   Past Medical History:   Diagnosis Date    Allergy     Anemia     with pregnancy    Anxiety     Anxiety disorder     Arthritis  12/13/2021    hips    Bowel habit changes 12/13/2021    constipation    Depression     Headache(784.0)     kid ston     pac 2009    and PVCs    PONV (postoperative nausea and vomiting) 1995    I throw up from anesthesia    Sciatica     Seizure (HCC)     Petit mal until puberty    Spinal headache 1999    Spinal headache from epidural due to childbirth    Urinary incontinence 2003    Leak urine from childbirth    Urinary tract infection, site not specified        PSHx:   Past Surgical History:   Procedure Laterality Date    CO NJX AA&/STRD TFRML EPI LUMBAR/SACRAL 1 LEVEL Left 12/13/2023    Procedure: LEFT L5-S1 transforaminal epidural steroid injection with fluoroscopic guidance.  Note: The patient must have completed the new MRI lumbar spine which was ordered in 12/4/2023.;  Surgeon: Bro Mojica M.D.;  [facility];  Service: Pain Management    CO DRAIN/INJECT LARGE JOINT/BURSA Left 10/11/2023    Procedure: Diagnostic and therapeutic Fluoroscopically guided intra-articular LEFT hip injection with sedation.       Note: plan on sedation 1.5mg versed.;  Surgeon: Bro Mojica M.D.;  [facility];  Service: Pain Management    CO LAP, KEMAR RESTRICT PROC, LONGITUDINAL GAS*  12/15/2021    Procedure: GASTRECTOMY, SLEEVE, LAPAROSCOPIC;  Surgeon: Edu Luis M.D.;  Location: [facility];  Service: General    CO INJECTION,SACROILIAC JOINT Left 8/10/2021    Procedure: LEFT sacroiliac joint injection with sedation;  Surgeon: Bro Mojica M.D.;  Location: [facility];  Service: Pain Management    LUMBAR TRANSFORAMINAL EPIDURAL STEROID INJECTION Left 11/16/2020    Procedure: INJECTION, STEROID, SPINE, LUMBAR, EPIDURAL, TRANSFORAMINAL APPROACH;  Surgeon: Bro Mojica M.D.;  Location: [facility];  Service: Pain Management    LITHOTRIPSY  2004    OTHER  Lithotripsy wisdom teeth    OTHER      cervical polyp removed        Family history   Family History   Problem Relation Age of Onset    Arthritis Mother         Rheumatoid arthritis    Diabetes Father     Hypertension Father     Hyperlipidemia Father     Hypertension Brother     Hypertension Maternal Grandmother     Cancer Maternal Grandfather         mesothelioma from asbestos    Hypertension Paternal Grandmother     Cancer Paternal Grandfather         bone    Hypertension Paternal Grandfather          Medications:   Outpatient Medications Marked as Taking for the 1/29/24 encounter (Office Visit) with Bro Mojica M.D.   Medication Sig Dispense Refill    phentermine 15 MG capsule TAKE 1 CAPSULE BY MOUTH ONCE DAILY FOR 30 DAYS      cyclobenzaprine (FLEXERIL) 10 mg Tab Take 1 Tablet by mouth 3 times a day as needed for Mild Pain, Moderate Pain or Muscle Spasms. 90 Tablet 3    LORazepam (ATIVAN) 1 MG Tab Take 1 mg by mouth 2 times a day as needed.      COLLAGEN PO Take  by mouth.      acetaminophen (TYLENOL) 500 MG Tab Take 500-1,000 mg by mouth every 6 hours as needed.          Current Outpatient Medications on File Prior to Visit   Medication Sig Dispense Refill    phentermine 15 MG capsule TAKE 1 CAPSULE BY MOUTH ONCE DAILY FOR 30 DAYS      cyclobenzaprine (FLEXERIL) 10 mg Tab Take 1 Tablet by mouth 3 times a day as needed for Mild Pain, Moderate Pain or Muscle Spasms. 90 Tablet 3    LORazepam (ATIVAN) 1 MG Tab Take 1 mg by mouth 2 times a day as needed.      COLLAGEN PO Take  by mouth.      acetaminophen (TYLENOL) 500 MG Tab Take 500-1,000 mg by mouth every 6 hours as needed.      phentolamine mesylate (REGITINE) 5 MG Recon Soln 15 mg one time. (Patient not taking: Reported on 1/29/2024)      docusate sodium (COLACE) 100 MG Cap Take 100 mg by mouth 2 times a day.      albuterol 108 (90 Base) MCG/ACT Aero Soln inhalation aerosol Inhale 2 Puffs every 6 hours as needed for Shortness of Breath. 8.5 g 0    promethazine-dextromethorphan (PROMETHAZINE-DM) 6.25-15 MG/5ML syrup  "Take 5 mL by mouth 4 times a day as needed for Cough. (Patient not taking: Reported on 2021) 120 mL 0    Ascorbic Acid (VITAMIN C) 1000 MG Tab Take  by mouth.      losartan (COZAAR) 50 MG Tab Take 50 mg by mouth every day. (Patient not taking: Reported on 2023)      Cholecalciferol (VITAMIN D3 PO) Take 50,000 Units by mouth. Once a week      progesterone (PROMETRIUM) 200 MG capsule TAKE 1 CAPSULE BY MOUTH ONCE DAILY FOR 12 DAYS EVERY 3 4 MONTHS (Patient not taking: No sig reported)      LORAZEPAM PO Take  by mouth.       No current facility-administered medications on file prior to visit.         Allergies:   Allergies   Allergen Reactions    Diagnostic X-Ray Materials Shortness of Breath    Contrast Media With Iodine [Iodine]      Pt reports becoming SOB with contrast media with iodine in the past    Gabapentin      Cognitive side effects     Other Drug      \"some epilepsy medications, unsure of name\"       Social Hx:   Social History     Socioeconomic History    Marital status:      Spouse name: Not on file    Number of children: Not on file    Years of education: Not on file    Highest education level: Not on file   Occupational History    Not on file   Tobacco Use    Smoking status: Former     Current packs/day: 0.00     Types: Cigarettes     Quit date:      Years since quittin.0    Smokeless tobacco: Never    Tobacco comments:     Socially twice a month now   Vaping Use    Vaping Use: Never used   Substance and Sexual Activity    Alcohol use: No    Drug use: No    Sexual activity: Yes     Partners: Male     Birth control/protection: Rhythm   Other Topics Concern     Service No    Blood Transfusions No    Caffeine Concern No    Occupational Exposure No    Hobby Hazards No    Sleep Concern Yes    Stress Concern No    Weight Concern Yes    Special Diet No    Back Care Yes    Exercise No    Bike Helmet No    Seat Belt Yes    Self-Exams Yes   Social History Narrative    Not on file " "    Social Determinants of Health     Financial Resource Strain: Not on file   Food Insecurity: Not on file   Transportation Needs: Not on file   Physical Activity: Not on file   Stress: Not on file   Social Connections: Not on file   Intimate Partner Violence: Not on file   Housing Stability: Not on file         EXAMINATION     Physical Exam:   Vitals: /72 (BP Location: Right arm, Patient Position: Sitting, BP Cuff Size: Adult)   Pulse 72   Temp 36.3 °C (97.3 °F) (Temporal)   Ht 1.753 m (5' 9\")   Wt 106 kg (233 lb 12.8 oz)   SpO2 98%     Constitutional:   Body Habitus: Body mass index is 34.53 kg/m².  Cooperation: Fully cooperates with exam  Appearance: Well-groomed no disheveled    Respiratory-  breathing comfortable on room air, no audible wheezing  Cardiovascular- capillary refills less than 2 seconds. No lower extremity edema is noted.   Psychiatric- alert and oriented ×3. Normal affect.    MSK and Neuro: -      Thoracic/Lumbar Spine/Sacral Spine/Hips   There are no signs of infection around the injection sites.   full  active range of motion with flexion, lateral flexion, and rotation bilaterally.   There is full  active range of motion with lumbar extension.    There is no  pain with lumbar extension.   There is no pain with facet loading maneuver (extension rotation) with axial low back pain on the BILATERAL side(s)       Palpation:   No tenderness to palpation in midline at T1-T12 levels. No tenderness to palpation in the left and right of the midline T1-L5, NEGATIVE for tenderness to palpation to the para-midline region in the lower lumbar levels.  palpation over SI joint: negative bilaterally    palpation in hip or over the gluteus medius tendon insertion: negative bilaterally      Lumbar spine Special tests  Neuro tension  Straight leg test negative bilaterally    Slump test negative bilaterally      Key points for the international standards for neurological classification of spinal cord " "injury (ISNCSCI) to light touch.     Dermatome R L                                      L2 2 2   L3 2 2   L4 2 2   L5 2 2   S1 2 2   S2 2 2         Motor Exam Lower Extremities    ? Myotome R L   Hip flexion L2 5 5   Knee extension L3 5 5   Ankle dorsiflexion L4 5 5   Toe extension L5 5 5   Ankle plantarflexion S1 5 5             MEDICAL DECISION MAKING    DATA    Labs:   Lab Results   Component Value Date/Time    SODIUM 142 12/17/2021 03:17 AM    POTASSIUM 4.1 12/17/2021 03:17 AM    CHLORIDE 109 12/17/2021 03:17 AM    CO2 20 12/17/2021 03:17 AM    GLUCOSE 106 (H) 12/17/2021 03:17 AM    BUN 8 12/17/2021 03:17 AM    CREATININE 0.44 (L) 12/17/2021 03:17 AM        Lab Results   Component Value Date/Time    PROTHROMBTM 14.0 12/13/2021 09:44 AM    INR 1.11 12/13/2021 09:44 AM        Lab Results   Component Value Date/Time    WBC 11.1 (H) 12/17/2021 03:17 AM    RBC 4.31 12/17/2021 03:17 AM    HEMOGLOBIN 12.8 12/17/2021 03:17 AM    HEMATOCRIT 38.9 12/17/2021 03:17 AM    MCV 90.3 12/17/2021 03:17 AM    MCH 29.7 12/17/2021 03:17 AM    MCHC 32.9 (L) 12/17/2021 03:17 AM    MPV 10.9 12/17/2021 03:17 AM        No results found for: \"HBA1C\"       Imaging:   I personally reviewed following images  X-ray hips 8/4/2021 with significant prominence of the anterolateral portion of the head neck junction of the bilateral hips consistent with hip impingement syndrome.    I reviewed the fluoroscopic images from 11/16/2020 showing successful left L5-S1 and S1 transforaminal epidural steroid injection.  I reviewed these with the patient on 12/14/2020.    MRI lumbar spine dated 10/16/2020  At L5-S1 there is a large left paracentral disc herniation with impingement on the descending left S1 nerve root.  At L4-5 there is a small disc protrusion with mild left neuroforaminal stenosis.    I reviewed the following radiology reports    XR hip                 Results for orders placed in visit on 10/16/20   MR-LUMBAR SPINE-W/O                       "                                                                                                                                                                                Results for orders placed during the hospital encounter of 20   DX-LUMBAR SPINE-2 OR 3 VIEWS    Impression No significant spondylosis. No acute fracture or listhesis.                                           DIAGNOSIS   Visit Diagnoses     ICD-10-CM   1. Lumbar radiculopathy  M54.16   2. Left hip impingement syndrome  M25.852   3. Chronic left hip pain  M25.552    G89.29   4. Arthritis of hip  M16.10   5. Chronic left-sided low back pain with left-sided sciatica  M54.42    G89.29   6. BMI 30-39.9  E66.9   7. Exercise counseling  Z71.82   8. Impaired mobility and ADLs  Z74.09    Z78.9           ASSESSMENT and PLAN:     José Luis Marinelli Bere  1975 female      José Luis Marinelli was seen today for follow-up.    Diagnoses and all orders for this visit:    Lumbar radiculopathy  -     Referral to Physical Therapy    Left hip impingement syndrome  -     Referral to Physical Therapy    Chronic left hip pain  -     Referral to Physical Therapy    Arthritis of hip  -     Referral to Physical Therapy    Chronic left-sided low back pain with left-sided sciatica  -     Referral to Physical Therapy    BMI 30-39.9  -     Referral to Physical Therapy    Exercise counseling  -     Referral to Physical Therapy    Impaired mobility and ADLs  -     Referral to Physical Therapy        Medications: Acetaminophen up to 1000 mg 3 times daily as needed not to exceed 3000 mg per 24-hours.  Continue Flexeril during flares of pain.    We discussed additions to home exercise program.  Above issues are stable.    Follow up: 1 year or sooner if needed    Thank you for allowing me to participate in the care of this patient. If you have any questions please not hesitate to contact me.             Please note that this dictation was created using voice recognition  software. I have made every reasonable attempt to correct obvious errors but there may be errors of grammar and content that I may have overlooked prior to finalization of this note.      Bro Mojica MD  Interventional Spine and Sports Physiatry  Physical Medicine and Rehabilitation  [facility]

## 2024-02-29 ENCOUNTER — OFFICE VISIT (OUTPATIENT)
Dept: URGENT CARE | Facility: PHYSICIAN GROUP | Age: 49
End: 2024-02-29
Payer: COMMERCIAL

## 2024-02-29 VITALS
HEART RATE: 87 BPM | TEMPERATURE: 97.2 F | HEIGHT: 69 IN | DIASTOLIC BLOOD PRESSURE: 86 MMHG | BODY MASS INDEX: 31.4 KG/M2 | SYSTOLIC BLOOD PRESSURE: 110 MMHG | WEIGHT: 212 LBS | RESPIRATION RATE: 16 BRPM | OXYGEN SATURATION: 97 %

## 2024-02-29 DIAGNOSIS — H60.392 OTHER INFECTIVE ACUTE OTITIS EXTERNA OF LEFT EAR: ICD-10-CM

## 2024-02-29 PROCEDURE — 1125F AMNT PAIN NOTED PAIN PRSNT: CPT | Performed by: NURSE PRACTITIONER

## 2024-02-29 PROCEDURE — 99214 OFFICE O/P EST MOD 30 MIN: CPT | Performed by: NURSE PRACTITIONER

## 2024-02-29 PROCEDURE — 3074F SYST BP LT 130 MM HG: CPT | Performed by: NURSE PRACTITIONER

## 2024-02-29 PROCEDURE — 3079F DIAST BP 80-89 MM HG: CPT | Performed by: NURSE PRACTITIONER

## 2024-02-29 RX ORDER — CIPROFLOXACIN AND DEXAMETHASONE 3; 1 MG/ML; MG/ML
4 SUSPENSION/ DROPS AURICULAR (OTIC) 2 TIMES DAILY
Qty: 7.5 ML | Refills: 0 | Status: SHIPPED | OUTPATIENT
Start: 2024-02-29 | End: 2024-03-07

## 2024-02-29 RX ORDER — PREDNISONE 50 MG/1
50 TABLET ORAL DAILY
Qty: 3 TABLET | Refills: 0 | Status: SHIPPED | OUTPATIENT
Start: 2024-02-29 | End: 2024-03-03

## 2024-02-29 ASSESSMENT — ENCOUNTER SYMPTOMS
FEVER: 0
COUGH: 0
HEADACHES: 0
NAUSEA: 0
DIARRHEA: 0
DIZZINESS: 0
MYALGIAS: 0
CHILLS: 0

## 2024-02-29 ASSESSMENT — PAIN SCALES - GENERAL: PAINLEVEL: 7=MODERATE-SEVERE PAIN

## 2024-02-29 NOTE — PROGRESS NOTES
Subjective     José Luis Blackburn is a 48 y.o. female who presents with Otalgia (Left ear pain x1 week per patient puss has come out of ear )            HPI  New problem.  Patient is a 48-year-old female presents with left-sided ear pain x 1 week.  She reports that it is painful in the canal area and has worsened today to the point where she has purulent discharge coming out of the canal.  She denies fever, chills, myalgia, nausea, or diarrhea.  She has been using hydrocortisone with acetic acid drops with no improvement of her symptoms.    Diagnostic x-ray materials, Contrast media with iodine [iodine], Gabapentin, and Other drug  Current Outpatient Medications on File Prior to Visit   Medication Sig Dispense Refill    phentermine 15 MG capsule TAKE 1 CAPSULE BY MOUTH ONCE DAILY FOR 30 DAYS      cyclobenzaprine (FLEXERIL) 10 mg Tab Take 1 Tablet by mouth 3 times a day as needed for Mild Pain, Moderate Pain or Muscle Spasms. 90 Tablet 3    LORazepam (ATIVAN) 1 MG Tab Take 1 mg by mouth 2 times a day as needed.      COLLAGEN PO Take  by mouth.      acetaminophen (TYLENOL) 500 MG Tab Take 500-1,000 mg by mouth every 6 hours as needed.      phentolamine mesylate (REGITINE) 5 MG Recon Soln 15 mg one time. (Patient not taking: Reported on 1/29/2024)      docusate sodium (COLACE) 100 MG Cap Take 100 mg by mouth 2 times a day.      albuterol 108 (90 Base) MCG/ACT Aero Soln inhalation aerosol Inhale 2 Puffs every 6 hours as needed for Shortness of Breath. 8.5 g 0    promethazine-dextromethorphan (PROMETHAZINE-DM) 6.25-15 MG/5ML syrup Take 5 mL by mouth 4 times a day as needed for Cough. (Patient not taking: Reported on 12/13/2021) 120 mL 0    Ascorbic Acid (VITAMIN C) 1000 MG Tab Take  by mouth.      losartan (COZAAR) 50 MG Tab Take 50 mg by mouth every day. (Patient not taking: Reported on 9/19/2023)      Cholecalciferol (VITAMIN D3 PO) Take 50,000 Units by mouth. Once a week      progesterone (PROMETRIUM) 200 MG  capsule TAKE 1 CAPSULE BY MOUTH ONCE DAILY FOR 12 DAYS EVERY 3 4 MONTHS (Patient not taking: No sig reported)      LORAZEPAM PO Take  by mouth.       No current facility-administered medications on file prior to visit.     Social History     Socioeconomic History    Marital status:      Spouse name: Not on file    Number of children: Not on file    Years of education: Not on file    Highest education level: Not on file   Occupational History    Not on file   Tobacco Use    Smoking status: Former     Current packs/day: 0.00     Types: Cigarettes     Quit date:      Years since quittin.1    Smokeless tobacco: Never    Tobacco comments:     Socially twice a month now   Vaping Use    Vaping Use: Never used   Substance and Sexual Activity    Alcohol use: No    Drug use: No    Sexual activity: Yes     Partners: Male     Birth control/protection: Rhythm   Other Topics Concern     Service No    Blood Transfusions No    Caffeine Concern No    Occupational Exposure No    Hobby Hazards No    Sleep Concern Yes    Stress Concern No    Weight Concern Yes    Special Diet No    Back Care Yes    Exercise No    Bike Helmet No    Seat Belt Yes    Self-Exams Yes   Social History Narrative    Not on file     Social Determinants of Health     Financial Resource Strain: Not on file   Food Insecurity: Not on file   Transportation Needs: Not on file   Physical Activity: Not on file   Stress: Not on file   Social Connections: Not on file   Intimate Partner Violence: Not on file   Housing Stability: Not on file     Breast Cancer-related family history is not on file.      Review of Systems   Constitutional:  Negative for chills, fever and malaise/fatigue.   HENT:  Positive for ear discharge, ear pain and hearing loss. Negative for congestion and tinnitus.    Respiratory:  Negative for cough.    Gastrointestinal:  Negative for diarrhea and nausea.   Musculoskeletal:  Negative for myalgias.   Neurological:  Negative for  "dizziness and headaches.   All other systems reviewed and are negative.             Objective     /86 (BP Location: Left arm, Patient Position: Sitting, BP Cuff Size: Adult)   Pulse 87   Temp 36.2 °C (97.2 °F) (Temporal)   Resp 16   Ht 1.753 m (5' 9\")   Wt 96.2 kg (212 lb) Comment: pt reported  SpO2 97%   BMI 31.31 kg/m²      Physical Exam  Constitutional:       General: She is not in acute distress.     Appearance: She is well-developed.   HENT:      Head: Normocephalic and atraumatic.      Right Ear: Ear canal and external ear normal. No middle ear effusion. Tympanic membrane is not injected or perforated.      Left Ear: Drainage, swelling and tenderness present.  No middle ear effusion. Tympanic membrane is not injected or perforated.      Ears:      Comments: Canal almost completely closed.     Nose: Mucosal edema present. No rhinorrhea.      Mouth/Throat:      Pharynx: No posterior oropharyngeal erythema.   Eyes:      General:         Right eye: No discharge.         Left eye: No discharge.      Conjunctiva/sclera: Conjunctivae normal.   Cardiovascular:      Rate and Rhythm: Normal rate and regular rhythm.      Heart sounds: Normal heart sounds. No murmur heard.  Pulmonary:      Effort: Pulmonary effort is normal. No respiratory distress.      Breath sounds: Normal breath sounds.   Musculoskeletal:         General: Normal range of motion.      Cervical back: Normal range of motion and neck supple.      Comments: Normal movement of all 4 extremities.   Lymphadenopathy:      Cervical: No cervical adenopathy.      Upper Body:      Right upper body: No supraclavicular adenopathy.      Left upper body: No supraclavicular adenopathy.   Skin:     General: Skin is warm and dry.   Neurological:      Mental Status: She is alert and oriented to person, place, and time.      Gait: Gait normal.   Psychiatric:         Behavior: Behavior normal.         Thought Content: Thought content normal.                "              Assessment & Plan        1. Other infective acute otitis externa of left ear  ciprofloxacin/dexamethasone (CIPRODEX) 0.3-0.1 % Suspension    predniSONE (DELTASONE) 50 MG Tab      Diagnosis with uncertain prognosis.  Patient advised on taking 2 extra strength Tylenol every 8 hours for her symptoms.  I have placed her on a 3-day course of 50 mg prednisone in hopes to get this ear canal open up more.  She is given a prescription for Ciprodex eardrops as well.  She is advised that if symptoms do not improve or if they worsen in the next 24 hours she is to head to the emergency department so they can put a cotton wick in this canal to facilitate the drops.  She is verbalized understanding of these instructions.

## 2024-03-26 ENCOUNTER — OFFICE VISIT (OUTPATIENT)
Dept: URGENT CARE | Facility: PHYSICIAN GROUP | Age: 49
End: 2024-03-26
Payer: COMMERCIAL

## 2024-03-26 VITALS
DIASTOLIC BLOOD PRESSURE: 70 MMHG | WEIGHT: 213 LBS | HEIGHT: 69 IN | OXYGEN SATURATION: 97 % | TEMPERATURE: 98.4 F | BODY MASS INDEX: 31.55 KG/M2 | RESPIRATION RATE: 20 BRPM | SYSTOLIC BLOOD PRESSURE: 118 MMHG | HEART RATE: 75 BPM

## 2024-03-26 DIAGNOSIS — J02.9 SORE THROAT: ICD-10-CM

## 2024-03-26 DIAGNOSIS — R05.1 ACUTE COUGH: ICD-10-CM

## 2024-03-26 DIAGNOSIS — R68.89 FLU-LIKE SYMPTOMS: ICD-10-CM

## 2024-03-26 LAB
FLUAV RNA SPEC QL NAA+PROBE: NEGATIVE
FLUBV RNA SPEC QL NAA+PROBE: NEGATIVE
RSV RNA SPEC QL NAA+PROBE: NEGATIVE
S PYO DNA SPEC NAA+PROBE: NOT DETECTED
SARS-COV-2 RNA RESP QL NAA+PROBE: NEGATIVE

## 2024-03-26 PROCEDURE — 99213 OFFICE O/P EST LOW 20 MIN: CPT | Performed by: FAMILY MEDICINE

## 2024-03-26 PROCEDURE — 3074F SYST BP LT 130 MM HG: CPT | Performed by: FAMILY MEDICINE

## 2024-03-26 PROCEDURE — 87651 STREP A DNA AMP PROBE: CPT | Performed by: FAMILY MEDICINE

## 2024-03-26 PROCEDURE — 0241U POCT CEPHEID COV-2, FLU A/B, RSV - PCR: CPT | Performed by: FAMILY MEDICINE

## 2024-03-26 PROCEDURE — 3078F DIAST BP <80 MM HG: CPT | Performed by: FAMILY MEDICINE

## 2024-03-26 RX ORDER — DEXTROMETHORPHAN HYDROBROMIDE AND PROMETHAZINE HYDROCHLORIDE 15; 6.25 MG/5ML; MG/5ML
5 SYRUP ORAL EVERY 4 HOURS PRN
Qty: 120 ML | Refills: 0 | Status: SHIPPED | OUTPATIENT
Start: 2024-03-26

## 2024-03-26 ASSESSMENT — ENCOUNTER SYMPTOMS
SHORTNESS OF BREATH: 1
CHILLS: 1
SORE THROAT: 1
GASTROINTESTINAL NEGATIVE: 1
EYES NEGATIVE: 1
FEVER: 1
COUGH: 1
CARDIOVASCULAR NEGATIVE: 1
SPUTUM PRODUCTION: 1

## 2024-03-26 NOTE — PROGRESS NOTES
"Subjective:   José Luis Blackburn is a 48 y.o. female who presents for Shortness of Breath (RUNNY NOSE,SINUS,COUGH,X1 WEEK)      Shortness of Breath  Associated symptoms include a fever, a sore throat and sputum production.       Review of Systems   Constitutional:  Positive for chills, fever and malaise/fatigue.   HENT:  Positive for congestion and sore throat.    Eyes: Negative.    Respiratory:  Positive for cough, sputum production and shortness of breath.    Cardiovascular: Negative.    Gastrointestinal: Negative.    Genitourinary: Negative.        Medications, Allergies, and current problem list reviewed today in Epic.     Objective:     /70 (BP Location: Right arm, Patient Position: Sitting, BP Cuff Size: Large adult)   Pulse 75   Temp 36.9 °C (98.4 °F) (Temporal)   Resp 20   Ht 1.753 m (5' 9\")   Wt 96.6 kg (213 lb)   SpO2 97%     Physical Exam  Vitals and nursing note reviewed.   Constitutional:       Appearance: She is ill-appearing.   HENT:      Head: Normocephalic and atraumatic.      Right Ear: Tympanic membrane normal.      Left Ear: Tympanic membrane normal.      Nose: Congestion present.      Mouth/Throat:      Pharynx: Posterior oropharyngeal erythema present.   Cardiovascular:      Rate and Rhythm: Normal rate and regular rhythm.      Pulses: Normal pulses.      Heart sounds: Normal heart sounds.   Pulmonary:      Effort: Pulmonary effort is normal.      Breath sounds: Normal breath sounds.   Musculoskeletal:      Cervical back: Tenderness present.   Lymphadenopathy:      Cervical: Cervical adenopathy present.   Neurological:      Mental Status: She is alert.         Assessment/Plan:     Diagnosis and associated orders:     1. Acute cough  promethazine-dextromethorphan (PROMETHAZINE-DM) 6.25-15 MG/5ML syrup      2. Sore throat  POCT CEPHEID GROUP A STREP - PCR      3. Flu-like symptoms  POCT CEPHEID COV-2, FLU A/B, RSV - PCR         Comments/MDM:              Differential diagnosis, " natural history, supportive care, and indications for immediate follow-up discussed.    Advised the patient to follow-up with the primary care physician for recheck, reevaluation, and consideration of further management.    Please note that this dictation was created using voice recognition software. I have made a reasonable attempt to correct obvious errors, but I expect that there are errors of grammar and possibly content that I did not discover before finalizing the note.    This note was electronically signed by Kike Carlos M.D.

## 2024-06-17 ENCOUNTER — OFFICE VISIT (OUTPATIENT)
Dept: PHYSICAL MEDICINE AND REHAB | Facility: MEDICAL CENTER | Age: 49
End: 2024-06-17
Payer: COMMERCIAL

## 2024-06-17 VITALS
SYSTOLIC BLOOD PRESSURE: 160 MMHG | WEIGHT: 234.8 LBS | TEMPERATURE: 97.8 F | HEIGHT: 69 IN | DIASTOLIC BLOOD PRESSURE: 91 MMHG | OXYGEN SATURATION: 98 % | BODY MASS INDEX: 34.78 KG/M2 | HEART RATE: 57 BPM

## 2024-06-17 DIAGNOSIS — M54.42 CHRONIC LEFT-SIDED LOW BACK PAIN WITH LEFT-SIDED SCIATICA: ICD-10-CM

## 2024-06-17 DIAGNOSIS — S29.012A STRAIN OF LEFT RHOMBOID MUSCLE: ICD-10-CM

## 2024-06-17 DIAGNOSIS — M54.16 LUMBAR RADICULOPATHY: ICD-10-CM

## 2024-06-17 DIAGNOSIS — M79.10 MYALGIA: ICD-10-CM

## 2024-06-17 DIAGNOSIS — G89.29 CHRONIC LEFT-SIDED LOW BACK PAIN WITH LEFT-SIDED SCIATICA: ICD-10-CM

## 2024-06-17 PROCEDURE — 3077F SYST BP >= 140 MM HG: CPT | Performed by: PHYSICAL MEDICINE & REHABILITATION

## 2024-06-17 PROCEDURE — 3080F DIAST BP >= 90 MM HG: CPT | Performed by: PHYSICAL MEDICINE & REHABILITATION

## 2024-06-17 PROCEDURE — 99214 OFFICE O/P EST MOD 30 MIN: CPT | Performed by: PHYSICAL MEDICINE & REHABILITATION

## 2024-06-17 PROCEDURE — 1125F AMNT PAIN NOTED PAIN PRSNT: CPT | Performed by: PHYSICAL MEDICINE & REHABILITATION

## 2024-06-17 ASSESSMENT — PATIENT HEALTH QUESTIONNAIRE - PHQ9
CLINICAL INTERPRETATION OF PHQ2 SCORE: 2
SUM OF ALL RESPONSES TO PHQ QUESTIONS 1-9: 6
5. POOR APPETITE OR OVEREATING: 1 - SEVERAL DAYS

## 2024-06-17 ASSESSMENT — PAIN SCALES - GENERAL: PAINLEVEL: 6=MODERATE PAIN

## 2024-06-18 NOTE — PROGRESS NOTES
Follow up patient note  Interventional spine and sports physiatry, Physical medicine rehabilitation      Chief complaint:   Chief Complaint   Patient presents with    Follow-Up     Back pain          HISTORY    Please see new patient note by Dr Mojica,  for more details.     HPI  Patient identification: José Luis Blackburn ,  1975,   With Diagnoses of Strain of left rhomboid muscle, Myalgia, Lumbar radiculopathy, and Chronic left-sided low back pain with left-sided sciatica were pertinent to this visit.     Procedures:  2020 left Lumbar Transforaminal Epidural Steroid  at the L5-S1 and S1 levels 100% improvement in radicular component and 50% improvement in back pain  8/10/2021 left sacroiliac joint injection with 50% pain relief  10/11/2023 left hip injection 90% pain relief postprocedure.  2023 left L5-S1 transforaminal epidural steroid injection with sedation 90% pain relief    The patient had acute onset pain over the past week with 5 out of 10 intensity pain in the left periscapular region worse with pulling towards her.  This happened acutely after doing laundry.  She denies radiating pain.  Denies shoulder pain neck pain or radiating pain.      She has had a provider driven and physical therapy driven home exercise program is done the exercises including the past 6 months.    Medications tried include ibuprofen, acetaminophen, flexeril. Cannot take nsaids because of bariatric surgery.     ROS Red Flags :   Fever, Chills, Sweats: Denies  Involuntary Weight Loss: Denies  Bowel/Bladder Incontinence: Denies  Saddle Anesthesia: Denies        PMHx:   Past Medical History:   Diagnosis Date    Allergy     Anemia     with pregnancy    Anxiety     Anxiety disorder     Arthritis 2021    hips    Bowel habit changes 2021    constipation    Depression     Headache(784.0)     kid ston     pac     and PVCs    PONV (postoperative nausea and vomiting)     I throw up from anesthesia     Sciatica     Seizure (HCC)     Petit mal until puberty    Spinal headache 1999    Spinal headache from epidural due to childbirth    Urinary incontinence 2003    Leak urine from childbirth    Urinary tract infection, site not specified        PSHx:   Past Surgical History:   Procedure Laterality Date    ME NJX AA&/STRD TFRML EPI LUMBAR/SACRAL 1 LEVEL Left 12/13/2023    Procedure: LEFT L5-S1 transforaminal epidural steroid injection with fluoroscopic guidance.  Note: The patient must have completed the new MRI lumbar spine which was ordered in 12/4/2023.;  Surgeon: Bro Mojica M.D.;  [facility];  Service: Pain Management    ME DRAIN/INJECT LARGE JOINT/BURSA Left 10/11/2023    Procedure: Diagnostic and therapeutic Fluoroscopically guided intra-articular LEFT hip injection with sedation.       Note: plan on sedation 1.5mg versed.;  Surgeon: Bro Mojica M.D.;  Location: [facility];  Service: Pain Management    ME LAP, KEMAR RESTRICT PROC, LONGITUDINAL GAS*  12/15/2021    Procedure: GASTRECTOMY, SLEEVE, LAPAROSCOPIC;  Surgeon: Edu Luis M.D.;  Location: [facility];  Service: General    ME INJECTION,SACROILIAC JOINT Left 8/10/2021    Procedure: LEFT sacroiliac joint injection with sedation;  Surgeon: Bro Mojica M.D.;  Location: [facility];  Service: Pain Management    LUMBAR TRANSFORAMINAL EPIDURAL STEROID INJECTION Left 11/16/2020    Procedure: INJECTION, STEROID, SPINE, LUMBAR, EPIDURAL, TRANSFORAMINAL APPROACH;  Surgeon: Bro Mojica M.D.;  Location: [facility];  Service: Pain Management    LITHOTRIPSY  2004    OTHER  Lithotripsy wisdom teeth    OTHER      cervical polyp removed       Family history   Family History   Problem Relation Age of Onset    Arthritis Mother         Rheumatoid arthritis    Diabetes Father     Hypertension Father     Hyperlipidemia Father     Hypertension Brother      Hypertension Maternal Grandmother     Cancer Maternal Grandfather         mesothelioma from asbestos    Hypertension Paternal Grandmother     Cancer Paternal Grandfather         bone    Hypertension Paternal Grandfather          Medications:   Outpatient Medications Marked as Taking for the 6/17/24 encounter (Office Visit) with Bro Mojica M.D.   Medication Sig Dispense Refill    diclofenac sodium (VOLTAREN) 1 % Gel Apply 3 g topically 4 times a day as needed (pain). 100 g 11    promethazine-dextromethorphan (PROMETHAZINE-DM) 6.25-15 MG/5ML syrup Take 5 mL by mouth every four hours as needed for Cough. 120 mL 0    phentermine 15 MG capsule TAKE 1 CAPSULE BY MOUTH ONCE DAILY FOR 30 DAYS      cyclobenzaprine (FLEXERIL) 10 mg Tab Take 1 Tablet by mouth 3 times a day as needed for Mild Pain, Moderate Pain or Muscle Spasms. 90 Tablet 3    LORazepam (ATIVAN) 1 MG Tab Take 1 mg by mouth 2 times a day as needed.      COLLAGEN PO Take  by mouth.      acetaminophen (TYLENOL) 500 MG Tab Take 500-1,000 mg by mouth every 6 hours as needed.          Current Outpatient Medications on File Prior to Visit   Medication Sig Dispense Refill    promethazine-dextromethorphan (PROMETHAZINE-DM) 6.25-15 MG/5ML syrup Take 5 mL by mouth every four hours as needed for Cough. 120 mL 0    phentermine 15 MG capsule TAKE 1 CAPSULE BY MOUTH ONCE DAILY FOR 30 DAYS      cyclobenzaprine (FLEXERIL) 10 mg Tab Take 1 Tablet by mouth 3 times a day as needed for Mild Pain, Moderate Pain or Muscle Spasms. 90 Tablet 3    LORazepam (ATIVAN) 1 MG Tab Take 1 mg by mouth 2 times a day as needed.      COLLAGEN PO Take  by mouth.      acetaminophen (TYLENOL) 500 MG Tab Take 500-1,000 mg by mouth every 6 hours as needed.      phentolamine mesylate (REGITINE) 5 MG Recon Soln 15 mg one time. (Patient not taking: Reported on 1/29/2024)      docusate sodium (COLACE) 100 MG Cap Take 100 mg by mouth 2 times a day.      albuterol 108 (90 Base) MCG/ACT Aero Soln  "inhalation aerosol Inhale 2 Puffs every 6 hours as needed for Shortness of Breath. 8.5 g 0    promethazine-dextromethorphan (PROMETHAZINE-DM) 6.25-15 MG/5ML syrup Take 5 mL by mouth 4 times a day as needed for Cough. (Patient not taking: Reported on 12/13/2021) 120 mL 0    Ascorbic Acid (VITAMIN C) 1000 MG Tab Take  by mouth.      losartan (COZAAR) 50 MG Tab Take 50 mg by mouth every day. (Patient not taking: Reported on 9/19/2023)      Cholecalciferol (VITAMIN D3 PO) Take 50,000 Units by mouth. Once a week      progesterone (PROMETRIUM) 200 MG capsule TAKE 1 CAPSULE BY MOUTH ONCE DAILY FOR 12 DAYS EVERY 3 4 MONTHS (Patient not taking: No sig reported)      LORAZEPAM PO Take  by mouth.       No current facility-administered medications on file prior to visit.         Allergies:   Allergies   Allergen Reactions    Diagnostic X-Ray Materials Shortness of Breath    Contrast Media With Iodine [Iodine]      Pt reports becoming SOB with contrast media with iodine in the past    Gabapentin      Cognitive side effects     Ondansetron Unspecified    Other Drug      \"some epilepsy medications, unsure of name\"       Social Hx:   Social History     Socioeconomic History    Marital status:      Spouse name: Not on file    Number of children: Not on file    Years of education: Not on file    Highest education level: Not on file   Occupational History    Not on file   Tobacco Use    Smoking status: Never    Smokeless tobacco: Never    Tobacco comments:     Socially twice a month now   Vaping Use    Vaping status: Never Used   Substance and Sexual Activity    Alcohol use: No    Drug use: No    Sexual activity: Yes     Partners: Male     Birth control/protection: Rhythm   Other Topics Concern     Service No    Blood Transfusions No    Caffeine Concern No    Occupational Exposure No    Hobby Hazards No    Sleep Concern Yes    Stress Concern No    Weight Concern Yes    Special Diet No    Back Care Yes    Exercise No    " "Bike Helmet No    Seat Belt Yes    Self-Exams Yes   Social History Narrative    Not on file     Social Determinants of Health     Financial Resource Strain: Not on File (2019)    Received from [employer]     Financial Resource Strain     Financial Resource Strain: 0   Food Insecurity: Not on File (2019)    Received from [employer]     Food Insecurity     Food: 0   Transportation Needs: Not on File (2019)    Received from [employer]     Transportation Needs     Transportation: 0   Physical Activity: Not on File (2019)    Received from [employer]     Physical Activity     Physical Activity: 0   Stress: Not on File (2019)    Received from [employer]     Stress     Stress: 0   Social Connections: Not on File (2019)    Received from [employer]     Social Connections     Social Connections and Isolation: 0   Intimate Partner Violence: Not on file   Housing Stability: Not on File (2019)    Received from [employer]     Housing Stability     Housin         EXAMINATION     Physical Exam:   Vitals: BP (!) 160/91 (BP Location: Left arm, Patient Position: Sitting, BP Cuff Size: Adult)   Pulse (!) 57   Temp 36.6 °C (97.8 °F) (Temporal)   Ht 1.753 m (5' 9\")   Wt 107 kg (234 lb 12.8 oz)   SpO2 98%     Constitutional:   Body Habitus: Body mass index is 34.67 kg/m².  Cooperation: Fully cooperates with exam  Appearance: Well-groomed no disheveled    Respiratory-  breathing comfortable on room air, no audible wheezing  Cardiovascular- capillary refills less than 2 seconds. No lower extremity edema is noted.   Psychiatric- alert and oriented ×3. Normal affect.    MSK and Neuro: -    No tenderness palpation throughout the shoulders.  Full range of motion of the bilateral shoulders.  Pain without weakness with activation of the left rhomboids major and rhomboid minor.  Strength is otherwise 5 out of 5 in the bilateral upper extremities.  Sensations intact.      MEDICAL DECISION MAKING    DATA    Labs:   Lab Results   Component " "Value Date/Time    SODIUM 142 12/17/2021 03:17 AM    POTASSIUM 4.1 12/17/2021 03:17 AM    CHLORIDE 109 12/17/2021 03:17 AM    CO2 20 12/17/2021 03:17 AM    GLUCOSE 106 (H) 12/17/2021 03:17 AM    BUN 8 12/17/2021 03:17 AM    CREATININE 0.44 (L) 12/17/2021 03:17 AM        Lab Results   Component Value Date/Time    PROTHROMBTM 14.0 12/13/2021 09:44 AM    INR 1.11 12/13/2021 09:44 AM        Lab Results   Component Value Date/Time    WBC 11.1 (H) 12/17/2021 03:17 AM    RBC 4.31 12/17/2021 03:17 AM    HEMOGLOBIN 12.8 12/17/2021 03:17 AM    HEMATOCRIT 38.9 12/17/2021 03:17 AM    MCV 90.3 12/17/2021 03:17 AM    MCH 29.7 12/17/2021 03:17 AM    MCHC 32.9 (L) 12/17/2021 03:17 AM    MPV 10.9 12/17/2021 03:17 AM        No results found for: \"HBA1C\"       Imaging:   I personally reviewed following images  X-ray hips 8/4/2021 with significant prominence of the anterolateral portion of the head neck junction of the bilateral hips consistent with hip impingement syndrome.    I reviewed the fluoroscopic images from 11/16/2020 showing successful left L5-S1 and S1 transforaminal epidural steroid injection.  I reviewed these with the patient on 12/14/2020.    MRI lumbar spine dated 10/16/2020  At L5-S1 there is a large left paracentral disc herniation with impingement on the descending left S1 nerve root.  At L4-5 there is a small disc protrusion with mild left neuroforaminal stenosis.    I reviewed the following radiology reports    XR hip                 Results for orders placed in visit on 10/16/20   MR-LUMBAR SPINE-W/O                                                                                                                                                                                                      Results for orders placed during the hospital encounter of 03/16/20   DX-LUMBAR SPINE-2 OR 3 VIEWS    Impression No significant spondylosis. No acute fracture or listhesis.                                       "     DIAGNOSIS   Visit Diagnoses     ICD-10-CM   1. Strain of left rhomboid muscle  S29.012A   2. Myalgia  M79.10   3. Lumbar radiculopathy  M54.16   4. Chronic left-sided low back pain with left-sided sciatica  M54.42    G89.29           ASSESSMENT and PLAN:     José Luis Brizuelayahaira  1975 female      José Luis Marinelli was seen today for follow-up.    Diagnoses and all orders for this visit:    Strain of left rhomboid muscle  -     diclofenac sodium (VOLTAREN) 1 % Gel; Apply 3 g topically 4 times a day as needed (pain).  -     Referral to Chiropractic  -     Referral to Physical Therapy    Myalgia  -     diclofenac sodium (VOLTAREN) 1 % Gel; Apply 3 g topically 4 times a day as needed (pain).  -     Referral to Chiropractic  -     Referral to Physical Therapy    Lumbar radiculopathy  -     Referral to Chiropractic  -     Referral to Physical Therapy    Chronic left-sided low back pain with left-sided sciatica  -     Referral to Chiropractic  -     Referral to Physical Therapy      No signs of muscle tear or tendon tear.    We discussed additions to home exercise program    Follow up: As needed with me    Thank you for allowing me to participate in the care of this patient. If you have any questions please not hesitate to contact me.             Please note that this dictation was created using voice recognition software. I have made every reasonable attempt to correct obvious errors but there may be errors of grammar and content that I may have overlooked prior to finalization of this note.      Bro Mojica MD  Interventional Spine and Sports Physiatry  Physical Medicine and Rehabilitation  [facility]

## 2024-11-11 ENCOUNTER — OFFICE VISIT (OUTPATIENT)
Dept: PHYSICAL MEDICINE AND REHAB | Facility: MEDICAL CENTER | Age: 49
End: 2024-11-11
Payer: COMMERCIAL

## 2024-11-11 VITALS
BODY MASS INDEX: 36.26 KG/M2 | DIASTOLIC BLOOD PRESSURE: 90 MMHG | SYSTOLIC BLOOD PRESSURE: 133 MMHG | HEART RATE: 83 BPM | OXYGEN SATURATION: 100 % | TEMPERATURE: 97.6 F | HEIGHT: 69 IN | WEIGHT: 244.8 LBS

## 2024-11-11 DIAGNOSIS — M16.10 ARTHRITIS OF HIP: ICD-10-CM

## 2024-11-11 DIAGNOSIS — Z91.81 RISK FOR FALLS: ICD-10-CM

## 2024-11-11 DIAGNOSIS — Z71.82 EXERCISE COUNSELING: ICD-10-CM
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
## 2024-11-11 DIAGNOSIS — E66.9 OBESITY (BMI 30-39.9): ICD-10-CM

## 2024-11-11 DIAGNOSIS — M54.50 ACUTE BILATERAL LOW BACK PAIN WITHOUT SCIATICA: ICD-10-CM

## 2024-11-11 PROCEDURE — 1125F AMNT PAIN NOTED PAIN PRSNT: CPT | Performed by: PHYSICAL MEDICINE & REHABILITATION

## 2024-11-11 PROCEDURE — 3075F SYST BP GE 130 - 139MM HG: CPT | Performed by: PHYSICAL MEDICINE & REHABILITATION

## 2024-11-11 PROCEDURE — 99214 OFFICE O/P EST MOD 30 MIN: CPT | Performed by: PHYSICAL MEDICINE & REHABILITATION

## 2024-11-11 PROCEDURE — 3080F DIAST BP >= 90 MM HG: CPT | Performed by: PHYSICAL MEDICINE & REHABILITATION

## 2024-11-11 ASSESSMENT — PATIENT HEALTH QUESTIONNAIRE - PHQ9: CLINICAL INTERPRETATION OF PHQ2 SCORE: 0

## 2024-11-11 ASSESSMENT — PAIN SCALES - GENERAL: PAINLEVEL_OUTOF10: 7=MODERATE-SEVERE PAIN

## 2024-11-11 NOTE — PROGRESS NOTES
Follow up patient note  Interventional spine and sports physiatry, Physical medicine rehabilitation      Chief complaint:   Chief Complaint   Patient presents with    Follow-Up     Back pain          HISTORY    Please see new patient note by Dr Mojica,  for more details.     HPI  Patient identification: José Luis Blackburn ,  1975,   With Diagnoses of Fall, initial encounter, Acute bilateral low back pain without sciatica, Arthritis of hip, BMI 30-39.9, Exercise counseling, and Risk for falls were pertinent to this visit.     Procedures:  2020 left Lumbar Transforaminal Epidural Steroid  at the L5-S1 and S1 levels 100% improvement in radicular component and 50% improvement in back pain  8/10/2021 left sacroiliac joint injection with 50% pain relief  10/11/2023 left hip injection 90% pain relief postprocedure.  2023 left L5-S1 transforaminal epidural steroid injection with sedation 90% pain relief    History of Present Illness  The patient is a 48-year-old female here for a follow-up visit.    She is experiencing acute on chronic pain bilaterally in the low back, which can radiate towards the bilateral buttocks. The pain is rated as 7-10 out of 10 in intensity. She reports that the pain is more pronounced on the left side and does not extend down her leg. However, when walking, she experiences a shooting pain in her pelvis that feels like an electric shock, causing her to be cautious with her steps.    She recounts an incident where she fell yesterday after tripping over her recliner while reaching for her dishes. She landed on her buttocks and felt a jolt up her spine. Initially, she was able to stand up, but as the night progressed, the pain intensified. She found it difficult to roll over in bed, even with a pillow under her knees.    Additionally, she mentions that she takes medication for anxiety.      She has had a provider driven and physical therapy driven home exercise program is done  the exercises including the past 6 months.    Medications tried include ibuprofen, acetaminophen, flexeril. Cannot take nsaids because of bariatric surgery.     ROS Red Flags :   Fever, Chills, Sweats: Denies  Involuntary Weight Loss: Denies  Bowel/Bladder Incontinence: Denies  Saddle Anesthesia: Denies        PMHx:   Past Medical History:   Diagnosis Date    Allergy     Anemia     with pregnancy    Anxiety     Anxiety disorder     Arthritis 12/13/2021    hips    Bowel habit changes 12/13/2021    constipation    Depression     Headache(784.0)     kid ston     pac 2009    and PVCs    PONV (postoperative nausea and vomiting) 1995    I throw up from anesthesia    Sciatica     Seizure (HCC)     Petit mal until puberty    Spinal headache 1999    Spinal headache from epidural due to childbirth    Urinary incontinence 2003    Leak urine from childbirth    Urinary tract infection, site not specified        PSHx:   Past Surgical History:   Procedure Laterality Date    NE NJX AA&/STRD TFRML EPI LUMBAR/SACRAL 1 LEVEL Left 12/13/2023    Procedure: LEFT L5-S1 transforaminal epidural steroid injection with fluoroscopic guidance.  Note: The patient must have completed the new MRI lumbar spine which was ordered in 12/4/2023.;  Surgeon: Bro Mojica M.D.;  [facility];  Service: Pain Management    NE DRAIN/INJECT LARGE JOINT/BURSA Left 10/11/2023    Procedure: Diagnostic and therapeutic Fluoroscopically guided intra-articular LEFT hip injection with sedation.       Note: plan on sedation 1.5mg versed.;  Surgeon: Bro Mojica M.D.;  Location: [facility];  Service: Pain Management    NE LAP KEMAR RESTRICT PROC LONGITUDINAL GAS*  12/15/2021    Procedure: GASTRECTOMY, SLEEVE, LAPAROSCOPIC;  Surgeon: Edu Luis M.D.;  Location: [facility];  Service: General    NE INJECTION,SACROILIAC JOINT Left 8/10/2021    Procedure: LEFT sacroiliac joint injection with sedation;   Surgeon: Bro Mojica M.D.;  Location: [facility];  Service: Pain Management    LUMBAR TRANSFORAMINAL EPIDURAL STEROID INJECTION Left 11/16/2020    Procedure: INJECTION, STEROID, SPINE, LUMBAR, EPIDURAL, TRANSFORAMINAL APPROACH;  Surgeon: Bro Mojica M.D.;  Location: [facility];  Service: Pain Management    LITHOTRIPSY  2004    OTHER  Lithotripsy wisdom teeth    OTHER      cervical polyp removed       Family history   Family History   Problem Relation Age of Onset    Arthritis Mother         Rheumatoid arthritis    Diabetes Father     Hypertension Father     Hyperlipidemia Father     Hypertension Brother     Hypertension Maternal Grandmother     Cancer Maternal Grandfather         mesothelioma from asbestos    Hypertension Paternal Grandmother     Cancer Paternal Grandfather         bone    Hypertension Paternal Grandfather          Medications:   Outpatient Medications Marked as Taking for the 11/11/24 encounter (Office Visit) with Bro Mojica M.D.   Medication Sig Dispense Refill    diclofenac sodium (VOLTAREN) 1 % Gel Apply 3 g topically 4 times a day as needed (pain). 100 g 11    promethazine-dextromethorphan (PROMETHAZINE-DM) 6.25-15 MG/5ML syrup Take 5 mL by mouth every four hours as needed for Cough. 120 mL 0    phentermine 15 MG capsule TAKE 1 CAPSULE BY MOUTH ONCE DAILY FOR 30 DAYS      cyclobenzaprine (FLEXERIL) 10 mg Tab Take 1 Tablet by mouth 3 times a day as needed for Mild Pain, Moderate Pain or Muscle Spasms. 90 Tablet 3    LORazepam (ATIVAN) 1 MG Tab Take 1 mg by mouth 2 times a day as needed.      COLLAGEN PO Take  by mouth.      acetaminophen (TYLENOL) 500 MG Tab Take 500-1,000 mg by mouth every 6 hours as needed.          Current Outpatient Medications on File Prior to Visit   Medication Sig Dispense Refill    diclofenac sodium (VOLTAREN) 1 % Gel Apply 3 g topically 4 times a day as needed (pain). 100 g 11    promethazine-dextromethorphan  "(PROMETHAZINE-DM) 6.25-15 MG/5ML syrup Take 5 mL by mouth every four hours as needed for Cough. 120 mL 0    phentermine 15 MG capsule TAKE 1 CAPSULE BY MOUTH ONCE DAILY FOR 30 DAYS      cyclobenzaprine (FLEXERIL) 10 mg Tab Take 1 Tablet by mouth 3 times a day as needed for Mild Pain, Moderate Pain or Muscle Spasms. 90 Tablet 3    LORazepam (ATIVAN) 1 MG Tab Take 1 mg by mouth 2 times a day as needed.      COLLAGEN PO Take  by mouth.      acetaminophen (TYLENOL) 500 MG Tab Take 500-1,000 mg by mouth every 6 hours as needed.      phentolamine mesylate (REGITINE) 5 MG Recon Soln 15 mg one time. (Patient not taking: Reported on 1/29/2024)      docusate sodium (COLACE) 100 MG Cap Take 100 mg by mouth 2 times a day.      albuterol 108 (90 Base) MCG/ACT Aero Soln inhalation aerosol Inhale 2 Puffs every 6 hours as needed for Shortness of Breath. 8.5 g 0    promethazine-dextromethorphan (PROMETHAZINE-DM) 6.25-15 MG/5ML syrup Take 5 mL by mouth 4 times a day as needed for Cough. (Patient not taking: Reported on 12/13/2021) 120 mL 0    Ascorbic Acid (VITAMIN C) 1000 MG Tab Take  by mouth.      losartan (COZAAR) 50 MG Tab Take 50 mg by mouth every day. (Patient not taking: Reported on 9/19/2023)      Cholecalciferol (VITAMIN D3 PO) Take 50,000 Units by mouth. Once a week      progesterone (PROMETRIUM) 200 MG capsule TAKE 1 CAPSULE BY MOUTH ONCE DAILY FOR 12 DAYS EVERY 3 4 MONTHS (Patient not taking: No sig reported)      LORAZEPAM PO Take  by mouth.       No current facility-administered medications on file prior to visit.         Allergies:   Allergies   Allergen Reactions    Diagnostic X-Ray Materials Shortness of Breath    Contrast Media With Iodine [Iodine]      Pt reports becoming SOB with contrast media with iodine in the past    Gabapentin      Cognitive side effects     Ondansetron Unspecified    Other Drug      \"some epilepsy medications, unsure of name\"       Social Hx:   Social History     Socioeconomic History    " Marital status:      Spouse name: Not on file    Number of children: Not on file    Years of education: Not on file    Highest education level: Not on file   Occupational History    Not on file   Tobacco Use    Smoking status: Never    Smokeless tobacco: Never    Tobacco comments:     Socially twice a month now   Vaping Use    Vaping status: Never Used   Substance and Sexual Activity    Alcohol use: No    Drug use: No    Sexual activity: Yes     Partners: Male     Birth control/protection: Rhythm   Other Topics Concern     Service No    Blood Transfusions No    Caffeine Concern No    Occupational Exposure No    Hobby Hazards No    Sleep Concern Yes    Stress Concern No    Weight Concern Yes    Special Diet No    Back Care Yes    Exercise No    Bike Helmet No    Seat Belt Yes    Self-Exams Yes   Social History Narrative    Not on file     Social Drivers of Health     Financial Resource Strain: Not on File (2019)    Received from [facility]    Financial Resource Strain     Financial Resource Strain: 0   Food Insecurity: Not on File (2019)    Received from [facility]    Food Insecurity     Food: 0   Transportation Needs: Not on File (2019)    Received from [facility]    Transportation Needs     Transportation: 0   Physical Activity: Not on File (2019)    Received from [facility]    Physical Activity     Physical Activity: 0   Stress: Not on File (2019)    Received from [facility]    Stress     Stress: 0   Social Connections: Not on File (2019)    Received from [facility]    Social Connections     Social Connections and Isolation: 0   Intimate Partner Violence: Not on file   Housing Stability: Not on File (2019)    Received from [facility]    Housing Stability     Housin         EXAMINATION     Physical Exam:   Vitals: BP (!) 133/90 (BP Location: Left arm, Patient Position: Sitting, BP Cuff Size: Large adult)   Pulse 83   Temp 36.4 °C (97.6 °F)  "(Temporal)   Ht 1.753 m (5' 9\")   Wt 111 kg (244 lb 12.8 oz)   SpO2 100%     Constitutional:   Body Habitus: Body mass index is 36.15 kg/m².  Cooperation: Fully cooperates with exam  Appearance: Well-groomed no disheveled    Respiratory-  breathing comfortable on room air, no audible wheezing  Cardiovascular- capillary refills less than 2 seconds. No lower extremity edema is noted.   Psychiatric- alert and oriented ×3. Normal affect.    MSK and Neuro: -    Positive for tenderness palpation and percussion in the lower lumbar spine over the bony prominence in the midline.  Strength is 5 out of 5 in the bilateral lower extremities.        MEDICAL DECISION MAKING    DATA    Labs:   Lab Results   Component Value Date/Time    SODIUM 142 12/17/2021 03:17 AM    POTASSIUM 4.1 12/17/2021 03:17 AM    CHLORIDE 109 12/17/2021 03:17 AM    CO2 20 12/17/2021 03:17 AM    GLUCOSE 106 (H) 12/17/2021 03:17 AM    BUN 8 12/17/2021 03:17 AM    CREATININE 0.44 (L) 12/17/2021 03:17 AM        Lab Results   Component Value Date/Time    PROTHROMBTM 14.0 12/13/2021 09:44 AM    INR 1.11 12/13/2021 09:44 AM        Lab Results   Component Value Date/Time    WBC 11.1 (H) 12/17/2021 03:17 AM    RBC 4.31 12/17/2021 03:17 AM    HEMOGLOBIN 12.8 12/17/2021 03:17 AM    HEMATOCRIT 38.9 12/17/2021 03:17 AM    MCV 90.3 12/17/2021 03:17 AM    MCH 29.7 12/17/2021 03:17 AM    MCHC 32.9 (L) 12/17/2021 03:17 AM    MPV 10.9 12/17/2021 03:17 AM        No results found for: \"HBA1C\"       Imaging:   I personally reviewed following images  X-ray hips 8/4/2021 with significant prominence of the anterolateral portion of the head neck junction of the bilateral hips consistent with hip impingement syndrome.    I reviewed the fluoroscopic images from 11/16/2020 showing successful left L5-S1 and S1 transforaminal epidural steroid injection.  I reviewed these with the patient on 12/14/2020.    MRI lumbar spine dated 10/16/2020  At L5-S1 there is a large left paracentral " disc herniation with impingement on the descending left S1 nerve root.  At L4-5 there is a small disc protrusion with mild left neuroforaminal stenosis.    I reviewed the following radiology reports    XR hip                 Results for orders placed in visit on 10/16/20   MR-LUMBAR SPINE-W/O                                                                                                                                                                                                      Results for orders placed during the hospital encounter of 20   DX-LUMBAR SPINE-2 OR 3 VIEWS    Impression No significant spondylosis. No acute fracture or listhesis.                                           DIAGNOSIS   Visit Diagnoses     ICD-10-CM   1. Fall, initial encounter  W19.XXXA   2. Acute bilateral low back pain without sciatica  M54.50   3. Arthritis of hip  M16.10   4. BMI 30-39.9  E66.9   5. Exercise counseling  Z71.82   6. Risk for falls  Z91.81           ASSESSMENT and PLAN:     José Luis Marinelli Bere  1975 female      José Luis Marinelli was seen today for follow-up.    Diagnoses and all orders for this visit:    Fall, initial encounter  -     DX-LUMBAR SPINE-2 OR 3 VIEWS; Future  -     MR-LUMBAR SPINE-W/O; Future    Acute bilateral low back pain without sciatica  -     DX-LUMBAR SPINE-2 OR 3 VIEWS; Future  -     MR-LUMBAR SPINE-W/O; Future    Arthritis of hip    BMI 30-39.9    Exercise counseling    Risk for falls  -     Patient identified as fall risk.  Appropriate orders and counseling given.      Assessment & Plan  She reports a fall yesterday, resulting in increased pain in the low back radiating towards the bilateral buttocks, rated 7-10 out of 10 in intensity. The pain has worsened overnight and is now higher up the spine, with tenderness more on the left side. There is no sciatica, but she experiences shooting pain when walking. An MRI and x-ray of the affected area have been ordered to investigate the  cause of her pain.  Fracture is on the differential given the severity of pain as well as tenderness palpation and percussion in the lower lumbar spine.  The patient understands emergency precautions.  There are no red flags on today's exam.    Follow-up  Return after the imaging is completed.  We discussed that I am going out of town for vacation and the patient will follow-up with one of my colleagues in my visit after the imaging study        Thank you for allowing me to participate in the care of this patient. If you have any questions please not hesitate to contact me.             Please note that this dictation was created using voice recognition software. I have made every reasonable attempt to correct obvious errors but there may be errors of grammar and content that I may have overlooked prior to finalization of this note.      Bro Mojica MD  Interventional Spine and Sports Physiatry  Physical Medicine and Rehabilitation  [facility]

## 2024-11-18 ENCOUNTER — APPOINTMENT (OUTPATIENT)
Dept: PHYSICAL MEDICINE AND REHAB | Facility: MEDICAL CENTER | Age: 49
End: 2024-11-18
Payer: COMMERCIAL

## 2024-12-04 ENCOUNTER — HOSPITAL ENCOUNTER (OUTPATIENT)
Dept: RADIOLOGY | Facility: MEDICAL CENTER | Age: 49
End: 2024-12-04
Attending: PHYSICAL MEDICINE & REHABILITATION
Payer: COMMERCIAL

## 2024-12-11 ENCOUNTER — APPOINTMENT (OUTPATIENT)
Dept: PHYSICAL MEDICINE AND REHAB | Facility: MEDICAL CENTER | Age: 49
End: 2024-12-11
Payer: COMMERCIAL

## 2024-12-11 VITALS
TEMPERATURE: 96.8 F | OXYGEN SATURATION: 99 % | WEIGHT: 248.6 LBS | HEART RATE: 66 BPM | SYSTOLIC BLOOD PRESSURE: 141 MMHG | HEIGHT: 69 IN | BODY MASS INDEX: 36.82 KG/M2 | DIASTOLIC BLOOD PRESSURE: 83 MMHG

## 2024-12-11 DIAGNOSIS — Z71.82 EXERCISE COUNSELING: ICD-10-CM

## 2024-12-11 DIAGNOSIS — Z91.81 RISK FOR FALLS: ICD-10-CM

## 2024-12-11 DIAGNOSIS — G89.29 CHRONIC LEFT HIP PAIN: ICD-10-CM

## 2024-12-11 DIAGNOSIS — E66.9 OBESITY (BMI 30-39.9): ICD-10-CM

## 2024-12-11 DIAGNOSIS — M25.852 LEFT HIP IMPINGEMENT SYNDROME: ICD-10-CM

## 2024-12-11 DIAGNOSIS — M54.16 LUMBAR RADICULOPATHY: ICD-10-CM

## 2024-12-11 DIAGNOSIS — M16.10 ARTHRITIS OF HIP: ICD-10-CM

## 2024-12-11 DIAGNOSIS — M25.552 CHRONIC LEFT HIP PAIN: ICD-10-CM

## 2024-12-11 PROCEDURE — 3079F DIAST BP 80-89 MM HG: CPT | Performed by: PHYSICAL MEDICINE & REHABILITATION

## 2024-12-11 PROCEDURE — 99214 OFFICE O/P EST MOD 30 MIN: CPT | Performed by: PHYSICAL MEDICINE & REHABILITATION

## 2024-12-11 PROCEDURE — 3077F SYST BP >= 140 MM HG: CPT | Performed by: PHYSICAL MEDICINE & REHABILITATION

## 2024-12-11 PROCEDURE — 1125F AMNT PAIN NOTED PAIN PRSNT: CPT | Performed by: PHYSICAL MEDICINE & REHABILITATION

## 2024-12-11 RX ORDER — POLYETHYLENE GLYCOL 3350 17 G/17G
17 POWDER, FOR SOLUTION ORAL DAILY
COMMUNITY

## 2024-12-11 ASSESSMENT — PATIENT HEALTH QUESTIONNAIRE - PHQ9
SUM OF ALL RESPONSES TO PHQ QUESTIONS 1-9: 6
CLINICAL INTERPRETATION OF PHQ2 SCORE: 1
5. POOR APPETITE OR OVEREATING: 1 - SEVERAL DAYS

## 2024-12-11 ASSESSMENT — PAIN SCALES - GENERAL: PAINLEVEL_OUTOF10: 4=SLIGHT-MODERATE PAIN

## 2024-12-11 NOTE — PROGRESS NOTES
Follow up patient note  Interventional spine and sports physiatry, Physical medicine rehabilitation      Chief complaint:   Chief Complaint   Patient presents with    Follow-Up     Back pain          HISTORY    Please see new patient note by Dr Mojica,  for more details.     HPI  Patient identification: José Luis Blackburn ,  1975,   With Diagnoses of Lumbar radiculopathy, Arthritis of hip, Left hip impingement syndrome, Chronic left hip pain, BMI 30-39.9, Exercise counseling, and Risk for falls were pertinent to this visit.     Procedures:  2020 left Lumbar Transforaminal Epidural Steroid  at the L5-S1 and S1 levels 100% improvement in radicular component and 50% improvement in back pain  8/10/2021 left sacroiliac joint injection with 50% pain relief  10/11/2023 left hip injection 90% pain relief postprocedure.  2023 left L5-S1 transforaminal epidural steroid injection with sedation 90% pain relief    History of Present Illness  The patient, a 49-year-old female, presents for a follow-up evaluation.    She reports persistent dorsalgia, characterized as an ache rather than the acute pain experienced immediately post-fall. Recently, she has noted a mild, electric-like pain in the region where she previously received injections. This pain does not extend deeply into the sciatic nerve but is associated with paresthesia on the plantar surface of her left foot. Additionally, she describes a continuous ache above the injection site, which she attributes to her prior fall. The patient is uncertain whether the pain originates from her hip or lumbar region, as it appears to encircle her body. She recalls receiving an injection in her hip via the anterior approach and questions whether the fall may have impacted her hip. She denies any groin pain. The patient expresses a desire to avoid reaching the severity of pain that previously necessitated an injection and is amenable to another injection if  required. She has reviewed her medical report and expresses concern regarding the mention of scoliosis. Furthermore, she inquires about potential treatments for inflammation, noting her inability to take NSAIDs due to a history of stomach surgery. She requests sedation for the upcoming procedure.  Pain radiates down the left leg and is 6 out of 10 in intensity on numeric rating scale.        She has had a provider driven and physical therapy driven home exercise program is done the exercises including the past 6 months.    Medications tried include ibuprofen, acetaminophen, flexeril. Cannot take nsaids because of bariatric surgery.     ROS Red Flags :   Fever, Chills, Sweats: Denies  Involuntary Weight Loss: Denies  Bowel/Bladder Incontinence: Denies  Saddle Anesthesia: Denies        PMHx:   Past Medical History:   Diagnosis Date    Allergy     Anemia     with pregnancy    Anxiety     Anxiety disorder     Arthritis 12/13/2021    hips    Bowel habit changes 12/13/2021    constipation    Depression     Headache(784.0)     kid ston     pac 2009    and PVCs    PONV (postoperative nausea and vomiting) 1995    I throw up from anesthesia    Sciatica     Seizure (HCC)     Petit mal until puberty    Spinal headache 1999    Spinal headache from epidural due to childbirth    Urinary incontinence 2003    Leak urine from childbirth    Urinary tract infection, site not specified        PSHx:   Past Surgical History:   Procedure Laterality Date    [place] NJX AA&/STRD TFRML EPI LUMBAR/SACRAL 1 LEVEL Left 12/13/2023    Procedure: LEFT L5-S1 transforaminal epidural steroid injection with fluoroscopic guidance.  Note: The patient must have completed the new MRI lumbar spine which was ordered in 12/4/2023.;  Surgeon: Bro Mojica M.D.;  [facility];  Service: Pain Management    [place] DRAIN/INJECT LARGE JOINT/BURSA Left 10/11/2023    Procedure: Diagnostic and therapeutic Fluoroscopically guided intra-articular  LEFT hip injection with sedation.       Note: plan on sedation 1.5mg versed.;  Surgeon: Bro Mojica M.D.;  [facility];  Service: Pain Management    [place] LAP KEMAR RESTRICT PROC LONGITUDINAL GAS*  12/15/2021    Procedure: GASTRECTOMY, SLEEVE, LAPAROSCOPIC;  Surgeon: Edu Luis M.D.;  Location: SUR[facility]ERY [facility];  Service: General    [place] INJECTION,SACROILIAC JOINT Left 8/10/2021    Procedure: LEFT sacroiliac joint injection with sedation;  Surgeon: Bro Mojica M.D.;  [facility];  Service: Pain Management    LUMBAR TRANSFORAMINAL EPIDURAL STEROID INJECTION Left 11/16/2020    Procedure: INJECTION, STEROID, SPINE, LUMBAR, EPIDURAL, TRANSFORAMINAL APPROACH;  Surgeon: Bro Mojica M.D.;  [facility];  Service: Pain Management    LITHOTRIPSY  2004    OTHER  Lithotripsy wisdom teeth    OTHER      cervical polyp removed       Family history   Family History   Problem Relation Age of Onset    Arthritis Mother         Rheumatoid arthritis    Diabetes Father     Hypertension Father     Hyperlipidemia Father     Hypertension Brother     Hypertension Maternal Grandmother     Cancer Maternal Grandfather         mesothelioma from asbestos    Hypertension Paternal Grandmother     Cancer Paternal Grandfather         bone    Hypertension Paternal Grandfather          Medications:   Outpatient Medications Marked as Taking for the 12/11/24 encounter (Office Visit) with Bro Mojica M.D.   Medication Sig Dispense Refill    Wheat Dextrin (BENEFIBER DRINK MIX PO)       polyethylene glycol/lytes (MIRALAX) Pack Take 17 g by mouth every day.      diclofenac sodium (VOLTAREN) 1 % Gel Apply 3 g topically 4 times a day as needed (pain). 100 g 11    phentermine 15 MG capsule TAKE 1 CAPSULE BY MOUTH ONCE DAILY FOR 30 DAYS      cyclobenzaprine (FLEXERIL) 10 mg Tab Take 1 Tablet by mouth 3 times a day as needed for Mild Pain, Moderate Pain or  Muscle Spasms. 90 Tablet 3    LORazepam (ATIVAN) 1 MG Tab Take 1 mg by mouth 2 times a day as needed.      COLLAGEN PO Take  by mouth.      acetaminophen (TYLENOL) 500 MG Tab Take 500-1,000 mg by mouth every 6 hours as needed.          Current Outpatient Medications on File Prior to Visit   Medication Sig Dispense Refill    Wheat Dextrin (BENEFIBER DRINK MIX PO)       polyethylene glycol/lytes (MIRALAX) Pack Take 17 g by mouth every day.      diclofenac sodium (VOLTAREN) 1 % Gel Apply 3 g topically 4 times a day as needed (pain). 100 g 11    phentermine 15 MG capsule TAKE 1 CAPSULE BY MOUTH ONCE DAILY FOR 30 DAYS      cyclobenzaprine (FLEXERIL) 10 mg Tab Take 1 Tablet by mouth 3 times a day as needed for Mild Pain, Moderate Pain or Muscle Spasms. 90 Tablet 3    LORazepam (ATIVAN) 1 MG Tab Take 1 mg by mouth 2 times a day as needed.      COLLAGEN PO Take  by mouth.      acetaminophen (TYLENOL) 500 MG Tab Take 500-1,000 mg by mouth every 6 hours as needed.      promethazine-dextromethorphan (PROMETHAZINE-DM) 6.25-15 MG/5ML syrup Take 5 mL by mouth every four hours as needed for Cough. (Patient not taking: Reported on 12/11/2024) 120 mL 0    phentolamine mesylate (REGITINE) 5 MG Recon Soln 15 mg one time. (Patient not taking: Reported on 1/29/2024)      docusate sodium (COLACE) 100 MG Cap Take 100 mg by mouth 2 times a day.      albuterol 108 (90 Base) MCG/ACT Aero Soln inhalation aerosol Inhale 2 Puffs every 6 hours as needed for Shortness of Breath. 8.5 g 0    promethazine-dextromethorphan (PROMETHAZINE-DM) 6.25-15 MG/5ML syrup Take 5 mL by mouth 4 times a day as needed for Cough. (Patient not taking: Reported on 12/13/2021) 120 mL 0    Ascorbic Acid (VITAMIN C) 1000 MG Tab Take  by mouth.      losartan (COZAAR) 50 MG Tab Take 50 mg by mouth every day. (Patient not taking: Reported on 9/19/2023)      Cholecalciferol (VITAMIN D3 PO) Take 50,000 Units by mouth. Once a week      progesterone (PROMETRIUM) 200 MG capsule  "TAKE 1 CAPSULE BY MOUTH ONCE DAILY FOR 12 DAYS EVERY 3 4 MONTHS (Patient not taking: No sig reported)      LORAZEPAM PO Take  by mouth.       No current facility-administered medications on file prior to visit.         Allergies:   Allergies   Allergen Reactions    Diagnostic X-Ray Materials Shortness of Breath    Contrast Media With Iodine [Iodine]      Pt reports becoming SOB with contrast media with iodine in the past    Gabapentin      Cognitive side effects     Ondansetron Unspecified    Other Drug      \"some epilepsy medications, unsure of name\"       Social Hx:   Social History     Socioeconomic History    Marital status:      Spouse name: Not on file    Number of children: Not on file    Years of education: Not on file    Highest education level: Not on file   Occupational History    Not on file   Tobacco Use    Smoking status: Never    Smokeless tobacco: Never    Tobacco comments:     Socially twice a month now   Vaping Use    Vaping status: Never Used   Substance and Sexual Activity    Alcohol use: No    Drug use: No    Sexual activity: Yes     Partners: Male     Birth control/protection: Rhythm   Other Topics Concern     Service No    Blood Transfusions No    Caffeine Concern No    Occupational Exposure No    Hobby Hazards No    Sleep Concern Yes    Stress Concern No    Weight Concern Yes    Special Diet No    Back Care Yes    Exercise No    Bike Helmet No    Seat Belt Yes    Self-Exams Yes   Social History Narrative    Not on file     Social Drivers of Health     Financial Resource Strain: Not on File (8/25/2019)    Received from [facility]    Financial Resource Strain     Financial Resource Strain: 0   Food Insecurity: Not on File (8/25/2019)    Received from [facility]    Food Insecurity     Food: 0   Transportation Needs: Not on File (8/25/2019)    Received from [facility]    Transportation Needs     Transportation: 0   Physical Activity: Not on File (8/25/2019)    Received from " "[facility]    Physical Activity     Physical Activity: 0   Stress: Not on File (2019)    Received from [facility]    Stress     Stress: 0   Social Connections: Not on File (2019)    Received from [facility]    Social Connections     Social Connections and Isolation: 0   Intimate Partner Violence: Not on file   Housing Stability: Not on File (2019)    Received from [facility]    Housing Stability     Housin         EXAMINATION     Physical Exam:   Vitals: BP (!) 141/83 (BP Location: Right arm, Patient Position: Sitting, BP Cuff Size: Large adult)   Pulse 66   Temp 36 °C (96.8 °F) (Temporal)   Ht 1.753 m (5' 9\")   Wt 113 kg (248 lb 9.6 oz)   SpO2 99%     Constitutional:   Body Habitus: Body mass index is 36.71 kg/m².  Cooperation: Fully cooperates with exam  Appearance: Well-groomed no disheveled    Respiratory-  breathing comfortable on room air, no audible wheezing  Cardiovascular- capillary refills less than 2 seconds. No lower extremity edema is noted.   Psychiatric- alert and oriented ×3. Normal affect.    MSK and Neuro: -      Thoracic/Lumbar Spine/Sacral Spine/Hips   There are no signs of infection around the injection sites.   decreased active range of motion with flexion, lateral flexion, and rotation bilaterally.   There is decreased active range of motion with lumbar extension.    There is  pain with lumbar extension.   There is pain with facet loading maneuver (extension rotation) with axial low back pain on the BILATERAL side(s)       Palpation:   No tenderness to palpation in midline at T1-T12 levels. No tenderness to palpation in the left and right of the midline T1-L5, NEGATIVE for tenderness to palpation to the para-midline region in the lower lumbar levels.  palpation over SI joint: negative bilaterally    palpation in hip or over the gluteus medius tendon insertion: negative bilaterally      Lumbar spine Special tests  Neuro tension  Straight leg test negative right, " "positive left    Slump test negative right, positive left      Key points for the international standards for neurological classification of spinal cord injury (ISNCSCI) to light touch.     Dermatome R L                                      L2 2 2   L3 2 2   L4 2 2   L5 2 1   S1 2 2   S2 2 2         Motor Exam Lower Extremities    ? Myotome R L   Hip flexion L2 5 5   Knee extension L3 5 5   Ankle dorsiflexion L4 5 5   Toe extension L5 5 5-   Ankle plantarflexion S1 5 5           MEDICAL DECISION MAKING    DATA    Labs:   Lab Results   Component Value Date/Time    SODIUM 142 12/17/2021 03:17 AM    POTASSIUM 4.1 12/17/2021 03:17 AM    CHLORIDE 109 12/17/2021 03:17 AM    CO2 20 12/17/2021 03:17 AM    GLUCOSE 106 (H) 12/17/2021 03:17 AM    BUN 8 12/17/2021 03:17 AM    CREATININE 0.44 (L) 12/17/2021 03:17 AM        Lab Results   Component Value Date/Time    PROTHROMBTM 14.0 12/13/2021 09:44 AM    INR 1.11 12/13/2021 09:44 AM        Lab Results   Component Value Date/Time    WBC 11.1 (H) 12/17/2021 03:17 AM    RBC 4.31 12/17/2021 03:17 AM    HEMOGLOBIN 12.8 12/17/2021 03:17 AM    HEMATOCRIT 38.9 12/17/2021 03:17 AM    MCV 90.3 12/17/2021 03:17 AM    MCH 29.7 12/17/2021 03:17 AM    MCHC 32.9 (L) 12/17/2021 03:17 AM    MPV 10.9 12/17/2021 03:17 AM        No results found for: \"HBA1C\"       Imaging:   I personally reviewed following images  X-ray hips 8/4/2021 with significant prominence of the anterolateral portion of the head neck junction of the bilateral hips consistent with hip impingement syndrome.    I reviewed the fluoroscopic images from 11/16/2020 showing successful left L5-S1 and S1 transforaminal epidural steroid injection.  I reviewed these with the patient on 12/14/2020.    MRI lumbar spine dated 10/16/2020  At L5-S1 there is a large left paracentral disc herniation with impingement on the descending left S1 nerve root.  At L4-5 there is a small disc protrusion with mild left neuroforaminal stenosis.    MRI " lumbar spine 2024 there are Modic changes with type II Modic changes at lumbar L2-3 and L5-S1.  At L5-S1 there is a left paracentric disc herniation also with right-sided contribution with bilateral foraminal stenosis left worse than right affecting the descending nerves.    I reviewed the following radiology reports  MRI lumbar spine 2024      XR hip                 Results for orders placed in visit on 10/16/20   MR-LUMBAR SPINE-W/O                                                                                                                                                                                                      Results for orders placed during the hospital encounter of 20   DX-LUMBAR SPINE-2 OR 3 VIEWS    Impression No significant spondylosis. No acute fracture or listhesis.                                           DIAGNOSIS   Visit Diagnoses     ICD-10-CM   1. Lumbar radiculopathy  M54.16   2. Arthritis of hip  M16.10   3. Left hip impingement syndrome  M25.852   4. Chronic left hip pain  M25.552    G89.29   5. BMI 30-39.9  E66.9   6. Exercise counseling  Z71.82   7. Risk for falls  Z91.81             ASSESSMENT and PLAN:     José Luis Marinelli Bere  1975 female      José Luis Marinelli was seen today for follow-up.    Diagnoses and all orders for this visit:    Lumbar radiculopathy  -     Referral to Physical Medicine Rehab    Arthritis of hip    Left hip impingement syndrome    Chronic left hip pain    BMI 30-39.9    Exercise counseling    Risk for falls  -     Patient identified as fall risk.  Appropriate orders and counseling given.        Assessment & Plan   The primary source of discomfort appears to be the back pain radiating down the leg, particularly with numbness in the foot and heel along the left leg. This symptomatology aligns with the pain originating from the disc herniation, as confirmed by the recent MRI. Additionally, there is evidence of disc degeneration and  endplate degeneration, which likely contribute to the overall pain experience. The presence of scoliosis is not a cause for concern at this stage.  I reviewed the MRI of the lumbar spine and there were no spinal fractures.     An order for a left L5-S1 transforaminal epidural steroid injection will be placed, with the procedure scheduled for January 2025.     The risks benefits and alternatives to this procedure were discussed and the patient wishes to proceed with the procedure. Risks include but are not limited to damage to surrounding structures, infection, bleeding, worsening of pain which can be permanent, weakness which can be permanent. Benefits include pain relief, improved function. Alternatives includes not doing the procedure.        She is advised to incorporate additional stretches and exercises into her routine to alleviate symptoms. If there is significant improvement, she should contact us to cancel the appointment. However, if symptoms persist or worsen, the appointment will remain scheduled. She is also recommended to consider turmeric supplements as a natural remedy for inflammation and joint pain.        Thank you for allowing me to participate in the care of this patient. If you have any questions please not hesitate to contact me.             Please note that this dictation was created using voice recognition software. I have made every reasonable attempt to correct obvious errors but there may be errors of grammar and content that I may have overlooked prior to finalization of this note.      Bro Mojica MD  Interventional Spine and Sports Physiatry  Physical Medicine and Rehabilitation  [facility]

## 2025-01-21 ENCOUNTER — HOSPITAL ENCOUNTER (OUTPATIENT)
Facility: REHABILITATION | Age: 50
End: 2025-01-21
Attending: PHYSICAL MEDICINE & REHABILITATION | Admitting: PHYSICAL MEDICINE & REHABILITATION
Payer: COMMERCIAL

## 2025-01-21 ENCOUNTER — APPOINTMENT (OUTPATIENT)
Dept: RADIOLOGY | Facility: REHABILITATION | Age: 50
End: 2025-01-21
Attending: PHYSICAL MEDICINE & REHABILITATION
Payer: COMMERCIAL

## 2025-01-21 VITALS
BODY MASS INDEX: 36.41 KG/M2 | WEIGHT: 245.81 LBS | RESPIRATION RATE: 16 BRPM | HEART RATE: 59 BPM | SYSTOLIC BLOOD PRESSURE: 121 MMHG | TEMPERATURE: 96.8 F | HEIGHT: 69 IN | OXYGEN SATURATION: 98 % | DIASTOLIC BLOOD PRESSURE: 74 MMHG

## 2025-01-21 PROCEDURE — 99152 MOD SED SAME PHYS/QHP 5/>YRS: CPT

## 2025-01-21 PROCEDURE — 64483 NJX AA&/STRD TFRM EPI L/S 1: CPT

## 2025-01-21 PROCEDURE — 700117 HCHG RX CONTRAST REV CODE 255: Mod: JZ

## 2025-01-21 PROCEDURE — 700111 HCHG RX REV CODE 636 W/ 250 OVERRIDE (IP): Mod: JZ

## 2025-01-21 PROCEDURE — A9579 GAD-BASE MR CONTRAST NOS,1ML: HCPCS | Mod: JZ

## 2025-01-21 RX ORDER — LIDOCAINE HYDROCHLORIDE 10 MG/ML
INJECTION, SOLUTION EPIDURAL; INFILTRATION; INTRACAUDAL; PERINEURAL
Status: COMPLETED
Start: 2025-01-21 | End: 2025-01-21

## 2025-01-21 RX ORDER — DEXAMETHASONE SODIUM PHOSPHATE 10 MG/ML
INJECTION, SOLUTION INTRAMUSCULAR; INTRAVENOUS
Status: COMPLETED
Start: 2025-01-21 | End: 2025-01-21

## 2025-01-21 RX ORDER — MIDAZOLAM HYDROCHLORIDE 1 MG/ML
INJECTION INTRAMUSCULAR; INTRAVENOUS
Status: COMPLETED
Start: 2025-01-21 | End: 2025-01-21

## 2025-01-21 RX ADMIN — MIDAZOLAM HYDROCHLORIDE 1 MG: 1 INJECTION, SOLUTION INTRAMUSCULAR; INTRAVENOUS at 09:24

## 2025-01-21 RX ADMIN — DEXAMETHASONE SODIUM PHOSPHATE 10 MG: 10 INJECTION, SOLUTION INTRAMUSCULAR; INTRAVENOUS at 09:29

## 2025-01-21 RX ADMIN — FENTANYL CITRATE 50 MCG: 50 INJECTION, SOLUTION INTRAMUSCULAR; INTRAVENOUS at 09:24

## 2025-01-21 RX ADMIN — GADOTERIDOL 10 ML: 279.3 INJECTION, SOLUTION INTRAVENOUS at 09:29

## 2025-01-21 RX ADMIN — LIDOCAINE HYDROCHLORIDE 10 ML: 10 INJECTION, SOLUTION EPIDURAL; INFILTRATION; INTRACAUDAL; PERINEURAL at 09:29

## 2025-01-21 ASSESSMENT — PAIN DESCRIPTION - PAIN TYPE
TYPE: CHRONIC PAIN
TYPE: CHRONIC PAIN

## 2025-01-21 NOTE — PROGRESS NOTES
Dr. Mojica in to see patient, questions answered    1007 Dr. Mojica in to see patient, discharge orders received

## 2025-01-21 NOTE — H&P
Physical medicine and rehabilitation preprocedure history & Physical Note    Date  1/21/2025    Primary Care Physician  MARGARETH Petit  Pre-Op Diagnosis Codes:      * Lumbar radiculopathy [M54.16]     HPI  This is a 49 y.o. female who presented with acute on chronic left low back pain radiating down the left leg    See previous notes of Dr. Mojica    Past Medical History:   Diagnosis Date    Allergy     Anemia     with pregnancy    Anxiety     Anxiety disorder     Arthritis 12/13/2021    hips    Bowel habit changes 12/13/2021    constipation    Depression     Headache(784.0)     kid ston     pac 2009    and PVCs    PONV (postoperative nausea and vomiting) 1995    I throw up from anesthesia    Sciatica     Seizure (HCC)     Petit mal until puberty    Spinal headache 1999    Spinal headache from epidural due to childbirth    Urinary incontinence 2003    Leak urine from childbirth    Urinary tract infection, site not specified        Past Surgical History:   Procedure Laterality Date    [place] NJX AA&/STRD TFRML EPI LUMBAR/SACRAL 1 LEVEL Left 12/13/2023    Procedure: LEFT L5-S1 transforaminal epidural steroid injection with fluoroscopic guidance.  Note: The patient must have completed the new MRI lumbar spine which was ordered in 12/4/2023.;  Surgeon: Bro Mojica M.D.;  [facility];  Service: Pain Management    [place] DRAIN/INJECT LARGE JOINT/BURSA Left 10/11/2023    Procedure: Diagnostic and therapeutic Fluoroscopically guided intra-articular LEFT hip injection with sedation.       Note: plan on sedation 1.5mg versed.;  Surgeon: Bro Mojica M.D.;  Location: [facility];  Service: Pain Management    HI LAP KEMAR RESTRICT PROC LONGITUDINAL GAS*  12/15/2021    Procedure: GASTRECTOMY, SLEEVE, LAPAROSCOPIC;  Surgeon: Edu Luis M.D.;  Location: SURGERY [facility];  Service: General    [place] INJECTION,SACROILIAC JOINT Left 8/10/2021    Procedure: LEFT  sacroiliac joint injection with sedation;  Surgeon: Bro Mojica M.D.;  Location: [facility];  Service: Pain Management    LUMBAR TRANSFORAMINAL EPIDURAL STEROID INJECTION Left 11/16/2020    Procedure: INJECTION, STEROID, SPINE, LUMBAR, EPIDURAL, TRANSFORAMINAL APPROACH;  Surgeon: Bro Mojica M.D.;  Location: [facility];  Service: Pain Management    LITHOTRIPSY  2004    OTHER  Lithotripsy wisdom teeth    OTHER      cervical polyp removed       No current facility-administered medications for this encounter.       Social History     Socioeconomic History    Marital status:      Spouse name: Not on file    Number of children: Not on file    Years of education: Not on file    Highest education level: Not on file   Occupational History    Not on file   Tobacco Use    Smoking status: Never    Smokeless tobacco: Never    Tobacco comments:     Socially twice a month now   Vaping Use    Vaping status: Never Used   Substance and Sexual Activity    Alcohol use: No    Drug use: No    Sexual activity: Yes     Partners: Male     Birth control/protection: Rhythm   Other Topics Concern     Service No    Blood Transfusions No    Caffeine Concern No    Occupational Exposure No    Hobby Hazards No    Sleep Concern Yes    Stress Concern No    Weight Concern Yes    Special Diet No    Back Care Yes    Exercise No    Bike Helmet No    Seat Belt Yes    Self-Exams Yes   Social History Narrative    Not on file     Social Drivers of Health     Financial Resource Strain: Not on File (8/25/2019)    Received from [facility]    Financial Resource Strain     Financial Resource Strain: 0   Food Insecurity: Not on File (8/25/2019)    Received from [facility]    Food Insecurity     Food: 0   Transportation Needs: Not on File (8/25/2019)    Received from [facility]    Transportation Needs     Transportation: 0   Physical Activity: Not on File (8/25/2019)    Received from [facility]     Physical Activity     Physical Activity: 0   Stress: Not on File (2019)    Received from [facility]    Stress     Stress: 0   Social Connections: Not on File (2019)    Received from [facility]    Social Connections     Social Connections and Isolation: 0   Intimate Partner Violence: Not on file   Housing Stability: Not on File (2019)    Received from [facility]    Housing Stability     Housin       Family History   Problem Relation Age of Onset    Arthritis Mother         Rheumatoid arthritis    Diabetes Father     Hypertension Father     Hyperlipidemia Father     Hypertension Brother     Hypertension Maternal Grandmother     Cancer Maternal Grandfather         mesothelioma from asbestos    Hypertension Paternal Grandmother     Cancer Paternal Grandfather         bone    Hypertension Paternal Grandfather        Allergies  Diagnostic x-ray materials, Contrast media with iodine [iodine], Gabapentin, Ondansetron, and Other drug    Review of Systems  Comprehensive review of systems was negative       Physical Exam  Constitutional:       General: She is not in acute distress.     Appearance: Normal appearance. She is not ill-appearing, toxic-appearing or diaphoretic.   Cardiovascular:      Rate and Rhythm: Normal rate and regular rhythm.      Pulses: Normal pulses.      Heart sounds: Normal heart sounds.   Pulmonary:      Effort: Pulmonary effort is normal.      Breath sounds: Normal breath sounds.   Abdominal:      General: Abdomen is flat. Bowel sounds are normal. There is no distension.      Palpations: Abdomen is soft.      Tenderness: There is no abdominal tenderness. There is no guarding or rebound.   Skin:     General: Skin is warm and dry.      Capillary Refill: Capillary refill takes less than 2 seconds.   Neurological:      Mental Status: She is alert.          Vital Signs  Blood Pressure: (!) 160/97   Temperature: 36 °C (96.8 °F)   Pulse: 67   Respiration: 16   Pulse Oximetry: 100 %      Vitals reviewed    Labs:                    Radiology:  DX-PORTABLE FLUOROSCOPY < 1 HOUR Reason For Exam: pain    (Results Pending)         Assessment/Plan:  Pre-Op Diagnosis Codes:      * Lumbar radiculopathy [M54.16]  Procedure(s):  LEFT L5-S1 transforaminal epidural steroid injection with fluoroscopic guidance…Note: 22-5. Plan on sedation 1mg versed and 50mcg fentanyl.

## 2025-01-21 NOTE — OP REPORT
Date of Service: 1/21/2025     Patient: José Luis Blackburn 49 y.o. female     MRN: [id number]     Physician/s: Bro Mojica MD    Pre-operative Diagnosis: Lumbar radiculopathy    Post-operative Diagnosis: Lumbar radiculopathy    Procedure: left Lumbar Transforaminal Epidural Steroid  at the L5-S1 levels with sedation    Description of procedure:    The risks, benefits, and alternatives of the procedure were reviewed and discussed with the patient.  Written informed consent was freely obtained. A pre-procedural time-out was conducted by the physician verifying patient’s identity, procedure to be performed, procedure site and side, and allergy verification. Appropriate equipment was determined to be in place for the procedure.     Moderation sedation was achieved with Versed (1mg) and Fentanyl (50mcg). Monitoring of the patients vital signs and respiratory status was provided by trained independent registered nurse during the entire course of the procedures and under my supervision and recoded in the patient’s medical record. The duration of sedation was over 10 minutes.     The patient's vital signs were carefully monitored before, throughout, and after the procedure.     In the fluoroscopy suite the patient was placed in a prone position, a pillow placed underneath their umbilicus. The skin was prepped and draped in the usual sterile fashion.     The fluoroscope was placed over the lumbar spine and adjusted into the proper AP/Oblique view to enter the transforaminal space just adjacent to the pedicle at the levels below. The targets for injection were then marked at the left L5-S1. A 27g 1.5 inch needle was placed into the marked site, and 1mL of 1% Lidocaine was injected subcutaneously into the epidermal and dermal layers. The needle was removed intact.  A 22g 5 inch spinal needle was then placed and advanced under fluoroscopic guidance in an oblique view towards the epidural space of the levels noted above. The  needle position was confirmed to not be past the 6 o'clock position in the AP view and it was in the neuroforamen in the lateral view.         Under live fluoroscopic guidance in the AP view, contrast dye was used to highlight the epidural space spread of each level above. Final fluoroscopic images were saved.  Following negative aspiration, 1mL of 1% lidocaine preservative free with 10mg dexamethasone   was then injected at each level above, and the needles were removed intact after restyleted. The patient's back was covered with a 4x4 gauze, the area was cleansed with sterile normal saline, and a dressing was applied. There were no complications noted.     This was a challenging procedure given the patients Body mass index is 36.3 kg/m².. This required longer needles.  A typical procedure such as this would require 25g 3.5 inch needles.  This procedure required 22g 5 inch needles.  This added to the mental effort for complexity this procedure.  This also made acquiring fluoroscopic images more time-consuming and challenging.  Final fluoroscopic images were obtained and saved.     The patient was then evaluated post-procedure, and was hemodynamically stable prior to leaving the post-operative care unit.     The patient had 80% pain relief postprocedure  Preprocedural pain: 6/10 NRS  Postprocedural pain: 1/10 NRS    Follow-up as scheduled    Bro Mojica MD  Interventional Spine and Pain  Physical Medicine and Rehabilitation  [facility]          CPT codes  Transforaminal epidural injection- lumbar or sacral (first level):  88224-34  moderate procedural sedation first 15 minutes: [id number]

## 2025-01-21 NOTE — OR SURGEON
Immediate Post OP Note    Pre-Op Diagnosis Codes:      * Lumbar radiculopathy [M54.16]      Post-Op Diagnosis Codes:     * Lumbar radiculopathy [M54.16]      Procedure(s):  LEFT L5-S1 transforaminal epidural steroid injection with fluoroscopic guidance     sedation 1mg versed and 50mcg fentanyl. - Wound Class: Clean    Surgeon(s):  Bro Mojica M.D.    Anesthesiologist/Type of Anesthesia:  No anesthesia staff entered./Local    Surgical Staff:  Circulator: Opal Bunn R.N.  Scrub Person: Rema Cullen  Radiology Technologist: Jose Glynn    Specimens removed if any:  * No specimens in log *    Estimated Blood Loss: None    Findings: None    Complications: None        1/21/2025 9:34 AM Bro Mojica M.D.

## 2025-01-23 ENCOUNTER — TELEPHONE (OUTPATIENT)
Dept: PHYSICAL MEDICINE AND REHAB | Facility: MEDICAL CENTER | Age: 50
End: 2025-01-23
Payer: COMMERCIAL

## 2025-01-23 NOTE — TELEPHONE ENCOUNTER
Called for post-sp check-up. Pt reported the following regarding the procedure site: LEFT L5-S1 transforaminal epidural steroid injection with fluoroscopic guidance     Change in pain?: LVM    Concerns?:     Confirmed [facility] appt?:

## 2025-03-06 ENCOUNTER — APPOINTMENT (OUTPATIENT)
Dept: PHYSICAL MEDICINE AND REHAB | Facility: MEDICAL CENTER | Age: 50
End: 2025-03-06
Payer: COMMERCIAL

## 2025-06-04 ENCOUNTER — APPOINTMENT (OUTPATIENT)
Dept: ADMISSIONS | Facility: MEDICAL CENTER | Age: 50
End: 2025-06-04
Attending: COLON & RECTAL SURGERY
Payer: COMMERCIAL

## 2025-06-06 ENCOUNTER — PRE-ADMISSION TESTING (OUTPATIENT)
Dept: ADMISSIONS | Facility: MEDICAL CENTER | Age: 50
End: 2025-06-06
Attending: COLON & RECTAL SURGERY
Payer: COMMERCIAL

## 2025-06-06 RX ORDER — OMEPRAZOLE 20 MG/1
20 CAPSULE, DELAYED RELEASE ORAL DAILY
COMMUNITY
Start: 2025-03-20

## 2025-06-06 RX ORDER — TIRZEPATIDE 5 MG/.5ML
5 INJECTION, SOLUTION SUBCUTANEOUS
COMMUNITY
Start: 2025-04-15

## 2025-06-06 NOTE — PREADMIT AVS NOTE
Current Medications   Medication Instructions    omeprazole (PRILOSEC) 20 MG delayed-release capsule Continue taking as prescribed.    ZEPBOUND 5 MG/0.5ML Solution Auto-injector Stop 7 days before surgery    multivitamin Tab Stop 7 days before surgery    CALCIUM PO Stop 7 days before surgery    Omega-3 Fatty Acids (FISH OIL PO) Stop 7 days before surgery    Wheat Dextrin (BENEFIBER DRINK MIX PO) Hold medication day of procedure    polyethylene glycol/lytes (MIRALAX) Pack Hold medication day of procedure    diclofenac sodium (VOLTAREN) 1 % Gel Stop 5 days before surgery    cyclobenzaprine (FLEXERIL) 10 mg Tab As needed medication, may take if needed, including morning of procedure     LORazepam (ATIVAN) 1 MG Tab As needed medication, may take if needed, including morning of procedure     acetaminophen (TYLENOL) 500 MG Tab As needed medication, may take if needed, including morning of procedure

## 2025-06-11 ENCOUNTER — ANESTHESIA EVENT (OUTPATIENT)
Dept: SURGERY | Facility: MEDICAL CENTER | Age: 50
End: 2025-06-11
Payer: COMMERCIAL

## 2025-06-11 ENCOUNTER — ANESTHESIA (OUTPATIENT)
Dept: SURGERY | Facility: MEDICAL CENTER | Age: 50
End: 2025-06-11
Payer: COMMERCIAL

## 2025-06-11 ENCOUNTER — HOSPITAL ENCOUNTER (OUTPATIENT)
Facility: MEDICAL CENTER | Age: 50
End: 2025-06-11
Attending: COLON & RECTAL SURGERY | Admitting: COLON & RECTAL SURGERY
Payer: COMMERCIAL

## 2025-06-11 VITALS
OXYGEN SATURATION: 97 % | BODY MASS INDEX: 34.91 KG/M2 | HEIGHT: 69 IN | SYSTOLIC BLOOD PRESSURE: 139 MMHG | HEART RATE: 67 BPM | TEMPERATURE: 96.7 F | WEIGHT: 235.67 LBS | RESPIRATION RATE: 14 BRPM | DIASTOLIC BLOOD PRESSURE: 81 MMHG

## 2025-06-11 PROBLEM — Z98.890 PONV (POSTOPERATIVE NAUSEA AND VOMITING): Status: ACTIVE | Noted: 2025-06-11

## 2025-06-11 PROBLEM — R11.2 PONV (POSTOPERATIVE NAUSEA AND VOMITING): Status: ACTIVE | Noted: 2025-06-11

## 2025-06-11 PROBLEM — I10 HTN (HYPERTENSION): Status: ACTIVE | Noted: 2025-06-11

## 2025-06-11 PROCEDURE — 700105 HCHG RX REV CODE 258: Performed by: COLON & RECTAL SURGERY

## 2025-06-11 PROCEDURE — 700101 HCHG RX REV CODE 250: Performed by: STUDENT IN AN ORGANIZED HEALTH CARE EDUCATION/TRAINING PROGRAM

## 2025-06-11 PROCEDURE — 160202 HCHG ENDO MINUTES - 1ST 30 MINS LEVEL 3: Performed by: COLON & RECTAL SURGERY

## 2025-06-11 PROCEDURE — 160025 RECOVERY II MINUTES (STATS): Performed by: COLON & RECTAL SURGERY

## 2025-06-11 PROCEDURE — 160048 HCHG OR STATISTICAL LEVEL 1-5: Performed by: COLON & RECTAL SURGERY

## 2025-06-11 PROCEDURE — 160197 HCHG MAC ANESTHESIA: Performed by: COLON & RECTAL SURGERY

## 2025-06-11 PROCEDURE — 160002 HCHG RECOVERY MINUTES (STAT): Performed by: COLON & RECTAL SURGERY

## 2025-06-11 PROCEDURE — 160046 HCHG PACU - 1ST 60 MINS PHASE II: Performed by: COLON & RECTAL SURGERY

## 2025-06-11 PROCEDURE — C1889 IMPLANT/INSERT DEVICE, NOC: HCPCS | Performed by: COLON & RECTAL SURGERY

## 2025-06-11 PROCEDURE — 160193 HCHG PACU STANDARD - 1ST 60 MINS: Performed by: COLON & RECTAL SURGERY

## 2025-06-11 PROCEDURE — 700102 HCHG RX REV CODE 250 W/ 637 OVERRIDE(OP): Performed by: STUDENT IN AN ORGANIZED HEALTH CARE EDUCATION/TRAINING PROGRAM

## 2025-06-11 PROCEDURE — 700111 HCHG RX REV CODE 636 W/ 250 OVERRIDE (IP): Performed by: COLON & RECTAL SURGERY

## 2025-06-11 PROCEDURE — 160015 HCHG STAT PREOP MINUTES: Performed by: COLON & RECTAL SURGERY

## 2025-06-11 PROCEDURE — A9270 NON-COVERED ITEM OR SERVICE: HCPCS | Performed by: STUDENT IN AN ORGANIZED HEALTH CARE EDUCATION/TRAINING PROGRAM

## 2025-06-11 PROCEDURE — 700111 HCHG RX REV CODE 636 W/ 250 OVERRIDE (IP): Performed by: STUDENT IN AN ORGANIZED HEALTH CARE EDUCATION/TRAINING PROGRAM

## 2025-06-11 RX ORDER — SODIUM CHLORIDE, SODIUM LACTATE, POTASSIUM CHLORIDE, CALCIUM CHLORIDE 600; 310; 30; 20 MG/100ML; MG/100ML; MG/100ML; MG/100ML
INJECTION, SOLUTION INTRAVENOUS CONTINUOUS
Status: CANCELLED | OUTPATIENT
Start: 2025-06-11 | End: 2025-06-11

## 2025-06-11 RX ORDER — LIDOCAINE HYDROCHLORIDE 20 MG/ML
INJECTION, SOLUTION EPIDURAL; INFILTRATION; INTRACAUDAL; PERINEURAL PRN
Status: DISCONTINUED | OUTPATIENT
Start: 2025-06-11 | End: 2025-06-11 | Stop reason: SURG

## 2025-06-11 RX ORDER — DIPHENHYDRAMINE HYDROCHLORIDE 50 MG/ML
12.5 INJECTION, SOLUTION INTRAMUSCULAR; INTRAVENOUS
Status: DISCONTINUED | OUTPATIENT
Start: 2025-06-11 | End: 2025-06-11 | Stop reason: HOSPADM

## 2025-06-11 RX ORDER — ONDANSETRON 2 MG/ML
4 INJECTION INTRAMUSCULAR; INTRAVENOUS
Status: DISCONTINUED | OUTPATIENT
Start: 2025-06-11 | End: 2025-06-11 | Stop reason: HOSPADM

## 2025-06-11 RX ORDER — ROCURONIUM BROMIDE 10 MG/ML
INJECTION, SOLUTION INTRAVENOUS PRN
Status: DISCONTINUED | OUTPATIENT
Start: 2025-06-11 | End: 2025-06-11 | Stop reason: SURG

## 2025-06-11 RX ORDER — HALOPERIDOL 5 MG/ML
1 INJECTION INTRAMUSCULAR
Status: DISCONTINUED | OUTPATIENT
Start: 2025-06-11 | End: 2025-06-11 | Stop reason: HOSPADM

## 2025-06-11 RX ORDER — TRIAMCINOLONE ACETONIDE 40 MG/ML
INJECTION, SUSPENSION INTRA-ARTICULAR; INTRAMUSCULAR
Status: DISCONTINUED | OUTPATIENT
Start: 2025-06-11 | End: 2025-06-11 | Stop reason: HOSPADM

## 2025-06-11 RX ORDER — METOPROLOL TARTRATE 1 MG/ML
1 INJECTION, SOLUTION INTRAVENOUS
Status: DISCONTINUED | OUTPATIENT
Start: 2025-06-11 | End: 2025-06-11 | Stop reason: HOSPADM

## 2025-06-11 RX ORDER — SODIUM CHLORIDE, SODIUM LACTATE, POTASSIUM CHLORIDE, CALCIUM CHLORIDE 600; 310; 30; 20 MG/100ML; MG/100ML; MG/100ML; MG/100ML
INJECTION, SOLUTION INTRAVENOUS CONTINUOUS
Status: DISCONTINUED | OUTPATIENT
Start: 2025-06-11 | End: 2025-06-11 | Stop reason: HOSPADM

## 2025-06-11 RX ORDER — MIDAZOLAM HYDROCHLORIDE 1 MG/ML
INJECTION INTRAMUSCULAR; INTRAVENOUS PRN
Status: DISCONTINUED | OUTPATIENT
Start: 2025-06-11 | End: 2025-06-11 | Stop reason: SURG

## 2025-06-11 RX ORDER — HYDRALAZINE HYDROCHLORIDE 20 MG/ML
5 INJECTION INTRAMUSCULAR; INTRAVENOUS
Status: DISCONTINUED | OUTPATIENT
Start: 2025-06-11 | End: 2025-06-11 | Stop reason: HOSPADM

## 2025-06-11 RX ORDER — SCOPOLAMINE 1 MG/3D
1 PATCH, EXTENDED RELEASE TRANSDERMAL
Status: DISCONTINUED | OUTPATIENT
Start: 2025-06-11 | End: 2025-06-11 | Stop reason: HOSPADM

## 2025-06-11 RX ORDER — ALBUTEROL SULFATE 5 MG/ML
2.5 SOLUTION RESPIRATORY (INHALATION)
Status: DISCONTINUED | OUTPATIENT
Start: 2025-06-11 | End: 2025-06-11 | Stop reason: HOSPADM

## 2025-06-11 RX ORDER — ONDANSETRON 2 MG/ML
INJECTION INTRAMUSCULAR; INTRAVENOUS PRN
Status: DISCONTINUED | OUTPATIENT
Start: 2025-06-11 | End: 2025-06-11 | Stop reason: SURG

## 2025-06-11 RX ORDER — EPHEDRINE SULFATE 50 MG/ML
5 INJECTION, SOLUTION INTRAVENOUS
Status: DISCONTINUED | OUTPATIENT
Start: 2025-06-11 | End: 2025-06-11 | Stop reason: HOSPADM

## 2025-06-11 RX ORDER — DEXAMETHASONE SODIUM PHOSPHATE 4 MG/ML
INJECTION, SOLUTION INTRA-ARTICULAR; INTRALESIONAL; INTRAMUSCULAR; INTRAVENOUS; SOFT TISSUE PRN
Status: DISCONTINUED | OUTPATIENT
Start: 2025-06-11 | End: 2025-06-11 | Stop reason: SURG

## 2025-06-11 RX ORDER — LABETALOL HYDROCHLORIDE 5 MG/ML
5 INJECTION, SOLUTION INTRAVENOUS
Status: DISCONTINUED | OUTPATIENT
Start: 2025-06-11 | End: 2025-06-11 | Stop reason: HOSPADM

## 2025-06-11 RX ADMIN — MIDAZOLAM HYDROCHLORIDE 2 MG: 1 INJECTION, SOLUTION INTRAMUSCULAR; INTRAVENOUS at 07:30

## 2025-06-11 RX ADMIN — ROCURONIUM BROMIDE 30 MG: 10 INJECTION INTRAVENOUS at 07:36

## 2025-06-11 RX ADMIN — LIDOCAINE HYDROCHLORIDE 80 MG: 20 INJECTION, SOLUTION EPIDURAL; INFILTRATION; INTRACAUDAL; PERINEURAL at 07:36

## 2025-06-11 RX ADMIN — PROPOFOL 150 MG: 10 INJECTION, EMULSION INTRAVENOUS at 07:36

## 2025-06-11 RX ADMIN — FENTANYL CITRATE 50 MCG: 50 INJECTION, SOLUTION INTRAMUSCULAR; INTRAVENOUS at 07:38

## 2025-06-11 RX ADMIN — SCOPOLAMINE 1 PATCH: 1.5 PATCH, EXTENDED RELEASE TRANSDERMAL at 07:24

## 2025-06-11 RX ADMIN — DEXAMETHASONE SODIUM PHOSPHATE 4 MG: 4 INJECTION INTRA-ARTICULAR; INTRALESIONAL; INTRAMUSCULAR; INTRAVENOUS; SOFT TISSUE at 07:36

## 2025-06-11 RX ADMIN — SODIUM CHLORIDE, POTASSIUM CHLORIDE, SODIUM LACTATE AND CALCIUM CHLORIDE: 600; 310; 30; 20 INJECTION, SOLUTION INTRAVENOUS at 07:30

## 2025-06-11 RX ADMIN — FENTANYL CITRATE 50 MCG: 50 INJECTION, SOLUTION INTRAMUSCULAR; INTRAVENOUS at 07:30

## 2025-06-11 RX ADMIN — ONDANSETRON 4 MG: 2 INJECTION INTRAMUSCULAR; INTRAVENOUS at 07:44

## 2025-06-11 ASSESSMENT — PAIN SCALES - GENERAL: PAIN_LEVEL: 0

## 2025-06-11 ASSESSMENT — PAIN DESCRIPTION - PAIN TYPE: TYPE: SURGICAL PAIN

## 2025-06-11 NOTE — OP REPORT
NAME:  José Luis Blackburn  MRN:  [id number]  :  1975      DATE OF OPERATION: 2025    PREOPERATIVE DIAGNOSIS: Sleeve Gastrectomy anatomy, GERD, and Dysphagia    POSTOPERATIVE DIAGNOSIS: Generally normal sleeve anatomy without Gastric Stenosis    FINDINGS: Normal sleeve anatomy, no evidence of esophagitis or stenosis; lesser curvature scar at mid sleeve incisura    OPERATION PERFORMED: Esophagogastroduodenoscopy with wide area transepithelial brushings, 4 quadrant endoscopic submucosal steroid injection of scar    ANESTHESIA: Anesthesiologist: Donnell Lopez M.D., MD     SURGEON: Edu Luis MD    SPECIMEN: brushings    COMPLICATIONS: None    ESTIMATED BLOOD LOSS: <2 cc    INDICATIONS: The patient is a 49 y.o. female with a history of Sleeve Gastrectomy anatomy, GERD, and Dysphagia. She is taken to the operating room today for Esophagogastroduodenoscopy and dilatation and possible septotomy for suspected stenosis.       DETAILS OF PROCEDURE: After an extensive informed consent discussion and process, the patient was brought to the operating room and was placed in a supine position on the endoscopy table. The patient was then given general anesthesia and endotracheal tube intubation. During the course of the procedure, the patient was monitored continuously with pulse oximetry, telemetry, and intermittent blood pressure readings.     After placement of the oral bite block to protect the teeth, gums, and gastroscope, the gastroscope was slowly introduced and advanced into the esophagus. It was slowly advanced down to the gastroesophageal junction region. The GE junction at 34 cm exhibited normal Zline without esophagitis. The gastroscope passed without difficulty into the proximal body of the stomach, which appeared normal and consistent with sleeve resection.     No stenosis was present, but at the Mid sleeve and included a web-like scar occurred at a site of slight angulation. While the  lumen was not narrow and even an achalsia balloon would not produce tissue dilation, it appeared this scar potentially could be playing a role in her symptoms. The gastroscope was then advanced into the duodenum without difficulty. The first, second, and third portions of the duodenum appeared normal with no evidence of duodenitis or ulcers. Slowly, the gastroscope was withdrawn, and the duodenum appeared normal. The antrum appeared normal. A retroflexion view was not possible to be safely performed.     40mg Kenelog were injected into the stenosis in four 1 cc aliquots and quadrants.    The gastroscope was slowly withdrawn as air was aspirated and the area of the proximal pouch and gastroesophageal junction region were again carefully inspected, which all appeared normal. The gastroesophageal junction was carefully inspected as was the esophagus as we withdrew the gastroscope and these areas appeared normal and were photographed for documentation purposes. The gastroscope was then withdrawn.    IMPRESSION/PLAN: Generally normal sleeve. Symptoms likely due to dysmotility, less likely due to mid sleeve lesser curvature side scar treated with submucosal steroid.  Will need further endoscopic septotomy and dilation in coming weeks or months. Continue PPI.    The patient tolerated the procedure well and there were no apparent complications.  She was awakened and transferred to the recovery area in satisfactory condition. Plan continue PPI. Consider motility study if symptoms persist.      ____________________________________   Edu Luis MD  DD: 11/25/2020  7:56 AM    CC:  [facility]

## 2025-06-11 NOTE — PROGRESS NOTES
Medication history reviewed with PT at bedside    [facility] is complete per PT reporting    Allergies reviewed.     Patient denies any outpatient antibiotics in the last 30 days.     Patient is not taking anticoagulants.    Dispense history is partially available in [employer].    Preferred pharmacy for this encounter - [facility] on [address] ([phone]). PT declined to use [facility]

## 2025-06-11 NOTE — ANESTHESIA TIME REPORT
Anesthesia Start and Stop Event Times       Date Time Event    6/11/2025 0706 Ready for Procedure     0730 Anesthesia Start     0756 Anesthesia Stop          Responsible Staff  06/11/25      Name Role Begin End    Donnell Lopez M.D. Anesth 0730 0756          Overtime Reason:  no overtime (within assigned shift)    Comments:

## 2025-06-11 NOTE — ANESTHESIA PREPROCEDURE EVALUATION
Case: [id number] Date/Time: 06/11/25 0715    Procedure: ESOPHAGOGASTRODUODENOSCOPY (EGD) WITH BRUSHINGS AND POSSIBLE DILATATION (Esophagus)    Anesthesia type: General    Pre-op diagnosis: DYSPHAGIA    Location: [facility] OR  / SURGERY [facility]    Surgeons: Edu Luis M.D.            Relevant Problems   ANESTHESIA   (positive) PONV (postoperative nausea and vomiting)      CARDIAC   (positive) HTN (hypertension)      Other   (positive) Arthritis       Physical Exam    Airway   Mallampati: III  TM distance: >3 FB  Neck ROM: full       Cardiovascular - normal exam  Rhythm: regular  Rate: normal     Dental - normal exam           Pulmonary - normal examBreath sounds clear to auscultation     Abdominal (+) obese     Neurological - normal exam                   Anesthesia Plan    ASA 3   ASA physical status 3 criteria: hypertension - poorly controlled    Plan - MAC               Induction: intravenous    Postoperative Plan: Postoperative administration of opioids is intended.    Pertinent diagnostic labs and testing reviewed    Informed Consent:    Anesthetic plan and risks discussed with patient.    Use of blood products discussed with: patient whom consented to blood products.

## 2025-06-11 NOTE — OR NURSING
Pt tolerating PO; verbalizes readiness for discharge. Instructions reviewed with pt and pt's  by Melissa ARMANDO. No further needs. Pt taken to car via wheelchair by RN.

## 2025-06-11 NOTE — ANESTHESIA POSTPROCEDURE EVALUATION
Patient: José Luis Blackburn    Procedure Summary       Date: 06/11/25 Room / Location: [place] [address] / SURGERY [facility]    Anesthesia Start: 0730 Anesthesia Stop: 0756    Procedure: ESOPHAGOGASTRODUODENOSCOPY (EGD) WITH BRUSHINGS (Esophagus) Diagnosis: (DYSPHAGIA)    Surgeons: Edu Luis M.D. Responsible Provider: Donnell Lopez M.D.    Anesthesia Type: MAC ASA Status: 3            Final Anesthesia Type: MAC  Last vitals  BP   Blood Pressure: 142/77    Temp   36.0 °C   Pulse   79   Resp   14    SpO2   95 %      Anesthesia Post Evaluation    Patient location during evaluation: PACU  Patient participation: complete - patient participated  Level of consciousness: awake and alert  Pain score: 0    Airway patency: patent  Anesthetic complications: no  Cardiovascular status: hemodynamically stable  Respiratory status: acceptable  Hydration status: euvolemic    PONV: none          No notable events documented.     Nurse Pain Score: 0 (NPRS)

## 2025-06-11 NOTE — DISCHARGE INSTRUCTIONS
HOME CARE INSTRUCTIONS    ACTIVITY: Rest and take it easy for the first 24 hours.  A responsible adult is recommended to remain with you during that time.  It is normal to feel sleepy.  We encourage you to not do anything that requires balance, judgment or coordination.    FOR 24 HOURS DO NOT:  Drive, operate machinery or run household appliances.  Drink beer or alcoholic beverages.  Make important decisions or sign legal documents.    SPECIAL INSTRUCTIONS:   EGD Discharge instructions:    1. DIET: Upon discharge from the hospital you may resume your normal preoperative diet. Depending on how you are feeling and whether you have nausea or not, you may wish to stay with a bland diet for the first few days. However, you can advance this as quickly as you feel ready.    2. ACTIVITIES: Upon discharge from the hospital, the day of surgery it is requested that you do no significant physical activity and limit mental activities, as you have had sedation. The day after discharge, you may resume full routine activities.     3. DRIVING: You may drive whenever you are off pain medications.    4. BOWEL FUNCTION: Constipation is common after receiving anesthesia. The combination of pain medication and decreased activity level can cause constipation in otherwise normal patients. If you feel this is occurring, take a laxative (Miralax, Milk of Magnesia, Ex-Lax, Senokot, etc.) until the problem has resolved.    5.CALL IF YOU HAVE: (1) Fevers to more than 101.0 F, (2) Unusual chest or leg pain, (3) Excessive bleeding or persistent nausea/vomiting, Please do not hesitate to call with any other questions.     6. APPOINTMENT: Please continue to follow your checklist for bariatric surgery. If a biopsy was performed, Dr. Luis's office will receive a copy of the report and you can call the office in 1-2 weeks for the results. If a hiatal hernia was detected, this can be repaired at the time of your bariatric surgery. Contact our office  at [phone] with any questions or concerns. Our office hours are Monday-Friday, 8am-4:30pm.  Please try to call during these hours when we are better able to assist you.    If you have any additional questions, please do not hesitate to call the office and speak to either myself or the physician on call.    Office address:  [place] [address] [facility]    [address]  [place] [address]     DIET: To avoid nausea, slowly advance diet as tolerated, avoiding spicy or greasy foods for the first day.  Add more substantial food to your diet according to your physician's instructions.  Babies can be fed formula or breast milk as soon as they are hungry.  INCREASE FLUIDS AND FIBER TO AVOID CONSTIPATION.    SURGICAL DRESSING/BATHING:  No dressings.  Okay to shower or bathe.    MEDICATIONS: Resume taking daily medication.  Take prescribed pain medication with food.  If no medication is prescribed, you may take non-aspirin pain medication if needed.  PAIN MEDICATION CAN BE VERY CONSTIPATING.  Take a stool softener or laxative such as senokot, pericolace, or milk of magnesia if needed.    No new prescriptions. A follow-up appointment should be arranged with your doctor in as scheduled; call to schedule.      You should call [phone] if you develop problems with breathing or chest pain.  If you are unable to contact your doctor or surgical center, you should go to the nearest emergency room or urgent care center.  Physician's telephone #: Dr. Luis [phone]    MILD FLU-LIKE SYMPTOMS ARE NORMAL.  YOU MAY EXPERIENCE GENERALIZED MUSCLE ACHES, THROAT IRRITATION, HEADACHE AND/OR SOME NAUSEA.    If any questions arise, call your doctor.  If your doctor is not available, please feel free to call the Surgical Center at [phone].  The Center is open Monday through Friday from 7AM to 7PM.      A registered nurse may call you a few days after your surgery to see how you are doing after your procedure.    You may  also receive a survey in the mail within the next two weeks and we ask that you take a few moments to complete the survey and return it to us.  Our goal is to provide you with very good care and we value your comments.     Depression / Suicide Risk    As you are discharged from this [facility] facility, it is important to learn how to keep safe from harming yourself.    Recognize the warning signs:  Abrupt changes in personality, positive or negative- including increase in energy   Giving away possessions  Change in eating patterns- significant weight changes-  positive or negative  Change in sleeping patterns- unable to sleep or sleeping all the time   Unwillingness or inability to communicate  Depression  Unusual sadness, discouragement and loneliness  Talk of wanting to die  Neglect of personal appearance   Rebelliousness- reckless behavior  Withdrawal from people/activities they love  Confusion- inability to concentrate     If you or a loved one observes any of these behaviors or has concerns about self-harm, here's what you can do:  Talk about it- your feelings and reasons for harming yourself  Remove any means that you might use to hurt yourself (examples: pills, rope, extension cords, firearm)  Get professional help from the community (Mental Health, Substance Abuse, psychological counseling)  Do not be alone:Call your Safe Contact- someone whom you trust who will be there for you.  Call your local CRISIS HOTLINE 606-8976 or 795-905-2670  Call your local Children's Mobile Crisis Response Team [place] (159) 466-0852 or www.SUNDAYTOZ  Call the toll free National Suicide Prevention Hotlines   National Suicide Prevention Lifeline 827-775-VCWG (3403)  [place] Hope Line Network 800-SUICIDE (820-4096)    I acknowledge receipt and understanding of these Home Care instructions.

## 2025-06-11 NOTE — ANESTHESIA PROCEDURE NOTES
Airway    Date/Time: 6/11/2025 7:37 AM    Performed by: Donnell Lopez M.D.  Authorized by: Donnell Lopez M.D.    Location:  OR  Urgency:  Elective  Indications for Airway Management:  Anesthesia      Spontaneous Ventilation: absent    Sedation Level:  Deep  Preoxygenated: Yes    Patient Position:  Sniffing  Mask Difficulty Assessment:  0 - not attempted  Final Airway Type:  Endotracheal airway  Final Endotracheal Airway:  ETT  Cuffed: Yes    Technique Used for Successful ETT Placement:  Direct laryngoscopy    Insertion Site:  Oral  Blade Type:  [place]  Laryngoscope Blade/Videolaryngoscope Blade Size:  3  ETT Size (mm):  7.0  Measured from:  Teeth  ETT to Teeth (cm):  21  Placement Verified by: auscultation and capnometry    Cormack-Lehane Classification:  Grade I - full view of glottis  Number of Attempts at Approach:  1  Number of Other Approaches Attempted:  0

## 2025-06-21 ENCOUNTER — HOSPITAL ENCOUNTER (OUTPATIENT)
Facility: MEDICAL CENTER | Age: 50
End: 2025-06-21
Payer: COMMERCIAL

## 2025-06-21 ENCOUNTER — OFFICE VISIT (OUTPATIENT)
Dept: URGENT CARE | Facility: PHYSICIAN GROUP | Age: 50
End: 2025-06-21
Payer: COMMERCIAL

## 2025-06-21 VITALS
OXYGEN SATURATION: 99 % | SYSTOLIC BLOOD PRESSURE: 136 MMHG | TEMPERATURE: 97.7 F | HEART RATE: 60 BPM | DIASTOLIC BLOOD PRESSURE: 78 MMHG | WEIGHT: 234 LBS | BODY MASS INDEX: 34.66 KG/M2 | HEIGHT: 69 IN | RESPIRATION RATE: 16 BRPM

## 2025-06-21 DIAGNOSIS — R39.89 BLADDER PAIN: ICD-10-CM

## 2025-06-21 DIAGNOSIS — R39.89 BLADDER PAIN: Primary | ICD-10-CM

## 2025-06-21 LAB
APPEARANCE UR: NORMAL
BILIRUB UR STRIP-MCNC: NORMAL MG/DL
COLOR UR AUTO: YELLOW
GLUCOSE UR STRIP.AUTO-MCNC: NORMAL MG/DL
KETONES UR STRIP.AUTO-MCNC: NORMAL MG/DL
LEUKOCYTE ESTERASE UR QL STRIP.AUTO: NORMAL
NITRITE UR QL STRIP.AUTO: NORMAL
PH UR STRIP.AUTO: 7.5 [PH] (ref 5–8)
POCT INT CON NEG: NEGATIVE
POCT INT CON POS: POSITIVE
POCT URINE PREGNANCY TEST: NEGATIVE
PROT UR QL STRIP: NORMAL MG/DL
RBC UR QL AUTO: NORMAL
SP GR UR STRIP.AUTO: 1.01
UROBILINOGEN UR STRIP-MCNC: 0.2 MG/DL

## 2025-06-21 PROCEDURE — 87660 TRICHOMONAS VAGIN DIR PROBE: CPT

## 2025-06-21 PROCEDURE — 3075F SYST BP GE 130 - 139MM HG: CPT

## 2025-06-21 PROCEDURE — 81025 URINE PREGNANCY TEST: CPT

## 2025-06-21 PROCEDURE — 3078F DIAST BP <80 MM HG: CPT

## 2025-06-21 PROCEDURE — 87086 URINE CULTURE/COLONY COUNT: CPT

## 2025-06-21 PROCEDURE — 81002 URINALYSIS NONAUTO W/O SCOPE: CPT

## 2025-06-21 PROCEDURE — 99213 OFFICE O/P EST LOW 20 MIN: CPT

## 2025-06-21 PROCEDURE — 87510 GARDNER VAG DNA DIR PROBE: CPT

## 2025-06-21 PROCEDURE — 87480 CANDIDA DNA DIR PROBE: CPT

## 2025-06-21 NOTE — PROGRESS NOTES
"Chief Complaint   Patient presents with    Pain     Bladder x 3 days No burning  Azo test         Subjective:   HISTORY OF PRESENT ILLNESS: José Luis Blackburn is a 49 y.o. female who presents for suprapubic pressure x 3 days   Patient denies dysuria, vaginal discharge or itching.  No pelvic or flank pain    Medications, Allergies, current problem list, Social and Family history reviewed today in Epic.     Objective:     /78 (BP Location: Right arm, Patient Position: Sitting, BP Cuff Size: Adult)   Pulse 60   Temp 36.5 °C (97.7 °F) (Temporal)   Resp 16   Ht 1.753 m (5' 9\")   Wt 106 kg (234 lb)   SpO2 99%     Physical Exam  Vitals reviewed.   Constitutional:       General: She is not in acute distress.     Appearance: Normal appearance. She is not ill-appearing or toxic-appearing.   HENT:      Mouth/Throat:      Mouth: Mucous membranes are moist.   Cardiovascular:      Rate and Rhythm: Normal rate.   Pulmonary:      Effort: Pulmonary effort is normal.   Abdominal:      General: Abdomen is flat. Bowel sounds are normal.      Palpations: Abdomen is soft.      Tenderness: There is abdominal tenderness in the suprapubic area. There is no right CVA tenderness, left CVA tenderness, guarding or rebound.   Genitourinary:     Comments: Deferred per pt  Skin:     General: Skin is warm and dry.      Capillary Refill: Capillary refill takes less than 2 seconds.   Neurological:      Mental Status: She is alert and oriented to person, place, and time.   Psychiatric:         Mood and Affect: Mood normal.          Assessment/Plan:     Diagnosis and associated orders    I personally reviewed prior external notes and test results pertinent to today's visit.     1. Bladder pain  POCT Urinalysis    POCT Pregnancy    URINE CULTURE(NEW)    VAGINAL PATHOGENS DNA PANEL        Results for orders placed or performed in visit on 06/21/25   POCT Pregnancy    Collection Time: 06/21/25 10:35 AM   Result Value Ref Range    POC Urine " Pregnancy Test Negative     Internal Control Positive Positive     Internal Control Negative Negative    POCT Urinalysis    Collection Time: 06/21/25 10:38 AM   Result Value Ref Range    POC Color Yellow Negative    POC Appearance Cloudy Negative    POC Glucose NEG Negative mg/dL    POC Bilirubin NEG Negative mg/dL    POC Ketones NEG Negative mg/dL    POC Specific Gravity 1.015 <1.005 - >1.030    POC Blood NEG Negative    POC Urine PH 7.5 5.0 - 8.0    POC Protein NEG Negative mg/dL    POC Urobiligen 0.2 Negative (0.2) mg/dL    POC Nitrites NEG Negative    POC Leukocyte Esterase NEG Negative        IMPRESSION:  Pt presents with suprapubic pain with dysuria, no vaginal bleeding, she is menopausal.  The patient is well appearing here with reassuring vitals signs.   Urine is inconsistent with UTI, have ordered additional testing to evaluate her symptoms.  Will contact pt and treat appropriately when results are available        Discussed  return to work/school, and indications to RTC or go to the Emergency department.    Patient states understanding of the plan of care and discharge instructions.  They are discharged in stable condition.         Please note that this dictation was created using voice recognition software. I have made a reasonable attempt to correct obvious errors, but I expect that there are errors of grammar and possibly content that I did not discover before finalizing the note.    This note was electronically signed by RADHA Rudolph

## 2025-06-22 ENCOUNTER — RESULTS FOLLOW-UP (OUTPATIENT)
Dept: URGENT CARE | Facility: CLINIC | Age: 50
End: 2025-06-22

## 2025-06-22 LAB
CANDIDA DNA VAG QL PROBE+SIG AMP: NEGATIVE
G VAGINALIS DNA VAG QL PROBE+SIG AMP: NEGATIVE
T VAGINALIS DNA VAG QL PROBE+SIG AMP: NEGATIVE

## 2025-06-24 LAB
BACTERIA UR CULT: NORMAL
SIGNIFICANT IND 70042: NORMAL
SITE SITE: NORMAL
SOURCE SOURCE: NORMAL

## 2025-06-25 NOTE — H&P
Surgery General History & Physical Note    Date  6/25/2025    Primary Care Physician  MARGARETH Petit    CC  * No Diagnosis Codes entered *    HPI  This is a 49 y.o. female who presented with GERD dyspepsia    Past Medical History[1]    Past Surgical History[2]    Current Medications[3]    Social History     Socioeconomic History    Marital status:      Spouse name: Not on file    Number of children: Not on file    Years of education: Not on file    Highest education level: Not on file   Occupational History    Not on file   Tobacco Use    Smoking status: Never    Smokeless tobacco: Never    Tobacco comments:     Smoked on and off. No longer smoking for several years   Vaping Use    Vaping status: Never Used   Substance and Sexual Activity    Alcohol use: No     Comment: occ    Drug use: No    Sexual activity: Yes     Partners: Male     Birth control/protection: Rhythm   Other Topics Concern     Service No    Blood Transfusions No    Caffeine Concern No    Occupational Exposure No    Hobby Hazards No    Sleep Concern Yes    Stress Concern No    Weight Concern Yes    Special Diet No    Back Care Yes    Exercise No    Bike Helmet No    Seat Belt Yes    Self-Exams Yes   Social History Narrative    Not on file     Social Drivers of Health     Financial Resource Strain: Not on File (8/25/2019)    Received from [email or web address]    Financial Resource Strain     Financial Resource Strain: 0   Food Insecurity: Not on File (8/25/2019)    Received from [email or web address]    Food Insecurity     Food: 0   Transportation Needs: Not on File (8/25/2019)    Received from [email or web address]    Transportation Needs     Transportation: 0   Physical Activity: Not on File (8/25/2019)    Received from [email or web address]    Physical Activity     Physical Activity: 0   Stress: Not on File (8/25/2019)    Received from [email or web address]    Stress     Stress: 0   Social Connections: Not on File (8/25/2019)    Received from [email or web address]    Social Connections     Social Connections  and Isolation: 0   Intimate Partner Violence: Not on file   Housing Stability: Not on File (2019)    Received from [employer]    Housing Stability     Housin       Family History   Problem Relation Age of Onset    Arthritis Mother         Rheumatoid arthritis    Diabetes Father     Hypertension Father     Hyperlipidemia Father     Hypertension Brother     Hypertension Maternal Grandmother     Cancer Maternal Grandfather         mesothelioma from asbestos    Hypertension Paternal Grandmother     Cancer Paternal Grandfather         bone    Hypertension Paternal Grandfather        Allergies  Contrast media with iodine [iodine], Diagnostic x-ray materials, Gabapentin, and Ondansetron    Review of Systems  Negative except for as documented    Physical Exam    Vital Signs  Blood Pressure: 139/81   Temperature: 35.9 °C (96.7 °F)   Pulse: 67   Respiration: 14   Pulse Oximetry: 97 %       Labs:                    Radiology:  No orders to display         Assessment/Plan:  * No Diagnosis Codes entered *  Procedure(s):  ESOPHAGOGASTRODUODENOSCOPY (EGD) WITH BRUSHINGS         [1]   Past Medical History:  Diagnosis Date    Allergy     Anemia     with pregnancy    Anxiety     Anxiety disorder     Arthritis 2021    hips    Bowel habit changes 2021    constipation    Bradycardia 2025    pt noticed low heart rate on apple watch.  ordered halter monitor but not concerned.    Depression     Headache(784.0)     HTN (hypertension) 2025    kid ston     pac     and PVCs    PONV (postoperative nausea and vomiting)     I throw up from anesthesia    Sciatica     Seizure (HCC)     Petit mal until puberty    Spinal headache     Spinal headache from epidural due to childbirth    Urinary incontinence     Leak urine from childbirth    Urinary tract infection, site not specified    [2]   Past Surgical History:  Procedure Laterality Date    FL UPPER GI ENDOSCOPY,DIAGNOSIS  2025    Procedure:  ESOPHAGOGASTRODUODENOSCOPY (EGD) WITH BRUSHINGS;  Surgeon: Edu Luis M.D.;  Location: SURGERY [facility];  Service: General    GA NJX AA&/STRD TFRML EPI LUMBAR/SACRAL 1 LEVEL Left 01/21/2025    Procedure: LEFT L5-S1 transforaminal epidural steroid injection with fluoroscopic guidance…Note: 22-5. Plan on sedation 1mg versed and 50mcg fentanyl.;  Surgeon: Bro Mojica M.D.;  [facility];  Service: Pain Management    GA NJX AA&/STRD TFRML EPI LUMBAR/SACRAL 1 LEVEL Left 12/13/2023    Procedure: LEFT L5-S1 transforaminal epidural steroid injection with fluoroscopic guidance.  Note: The patient must have completed the new MRI lumbar spine which was ordered in 12/4/2023.;  Surgeon: Bro Mojica M.D.;  Location: [facility];  Service: Pain Management    GA DRAIN/INJECT LARGE JOINT/BURSA Left 10/11/2023    Procedure: Diagnostic and therapeutic Fluoroscopically guided intra-articular LEFT hip injection with sedation.       Note: plan on sedation 1.5mg versed.;  Surgeon: Bro Mojica M.D.;  Location: [facility];  Service: Pain Management    GA LAP KEMAR RESTRICT PROC LONGITUDINAL GAS*  12/15/2021    Procedure: GASTRECTOMY, SLEEVE, LAPAROSCOPIC;  Surgeon: Edu Luis M.D.;  Location: SURGERY [facility];  Service: General    GA INJECTION,SACROILIAC JOINT Left 08/10/2021    Procedure: LEFT sacroiliac joint injection with sedation;  Surgeon: Bro Mojica M.D.;  Location: [facility];  Service: Pain Management    LUMBAR TRANSFORAMINAL EPIDURAL STEROID INJECTION Left 11/16/2020    Procedure: INJECTION, STEROID, SPINE, LUMBAR, EPIDURAL, TRANSFORAMINAL APPROACH;  Surgeon: Bro Mojica M.D.;  Location: [facility];  Service: Pain Management    LITHOTRIPSY  2004    OTHER  Lithotripsy wisdom teeth    OTHER      cervical polyp removed    OTHER      gastric sleeve 12/15/2021   [3]   No current  facility-administered medications for this encounter.     Current Outpatient Medications   Medication Sig Dispense Refill    omeprazole (PRILOSEC) 20 MG delayed-release capsule Take 20 mg by mouth every day.      Wheat Dextrin (BENEFIBER DRINK MIX PO) Take 1 Scoop by mouth every day.      polyethylene glycol/lytes (MIRALAX) Pack Take 17 g by mouth every day.      LORazepam (ATIVAN) 1 MG Tab Take 1 mg by mouth 2 times a day as needed for Anxiety.      ZEPBOUND 5 MG/0.5ML Solution Auto-injector Inject 5 mg under the skin every 7 days.      multivitamin Tab Take 1 Tablet by mouth every day. Bariatric fusion daily with iron      CALCIUM PO Take 1 Tablet by mouth every evening.      Omega-3 Fatty Acids (FISH OIL PO) Take 1,000 mg by mouth every evening.      cyclobenzaprine (FLEXERIL) 10 mg Tab Take 1 Tablet by mouth 3 times a day as needed for Mild Pain, Moderate Pain or Muscle Spasms. 90 Tablet 3    acetaminophen (TYLENOL) 500 MG Tab Take 1,000 mg by mouth every 6 hours as needed for Moderate Pain.

## (undated) DEVICE — BOVIE BLADE COATED - (50/PK)

## (undated) DEVICE — TOWEL STOP TIMEOUT SAFETY FLAG (40EA/CA)

## (undated) DEVICE — PROTECTOR ULNA NERVE - (36PR/CA)

## (undated) DEVICE — APPLICATOR DUPLO SPRAYER (5EA/CA)

## (undated) DEVICE — MASK ANESTHESIA ADULT  - (100/CA)

## (undated) DEVICE — SUTURE GENERAL

## (undated) DEVICE — ELECTRODE DUAL RETURN W/ CORD - (50/PK)

## (undated) DEVICE — GOWN WARMING STANDARD FLEX - (30/CA)

## (undated) DEVICE — SLEEVE, VASO, REPROC, LARGE

## (undated) DEVICE — NEPTUNE 4 PORT MANIFOLD - (20/PK)

## (undated) DEVICE — SCISSORS 5MM CVD (6EA/BX)

## (undated) DEVICE — SUTURE 0 LIGATING REEL VICRYL PLUS (12PK/BX)

## (undated) DEVICE — RELOAD WITH GRIPPING SURGACE TECHNOLOGY GREEN 60MM (12EA/BX)

## (undated) DEVICE — CATHERTER CLEAR SINGLE USE INJECTION THERAPY NEEDLE 25GA X 4MM 2.3MM X 240CM (5EA/BX)

## (undated) DEVICE — SUTURE 4-0 VICRYL PLUSFS-1 - 27 INCH (36/BX)

## (undated) DEVICE — DRAPESURG STERI-DRAPE LONG - (10/BX 4BX/CA)

## (undated) DEVICE — GLOVE SZ 7 BIOGEL PI MICRO - PF LF (50PR/BX 4BX/CA)

## (undated) DEVICE — KIT ANESTHESIA W/CIRCUIT & 3/LT BAG W/FILTER (20EA/CA)

## (undated) DEVICE — BAG RETRIEVAL 12/15 MM INZII (5EA/CA) THIS WILL REPLACE ITEM 75018

## (undated) DEVICE — TROCAR SEPARATOR 15MMZTHREAD - (6/BX)

## (undated) DEVICE — HEAD HOLDER JUNIOR/ADULT

## (undated) DEVICE — SENSOR SPO2 NEO LNCS ADHESIVE (20/BX) SEE USER NOTES

## (undated) DEVICE — SUCTION INSTRUMENT YANKAUER BULBOUS TIP W/O VENT (50EA/CA)

## (undated) DEVICE — SET EXTENSION WITH 2 PORTS (48EA/CA) ***PART #2C8610 IS A SUBSTITUTE*****

## (undated) DEVICE — SUTURE2-0 27IN VCRL ANTI VIOL (36PK/BX)

## (undated) DEVICE — RELOAD WITH GRIPPING SURFACE TECHNOLOGY GOLD 60MM (12EA/BX)

## (undated) DEVICE — TISSEEL 4ML ----MUST ORDER A MIN OF 6EA----

## (undated) DEVICE — LACTATED RINGERS INJ 1000 ML - (14EA/CA 60CA/PF)

## (undated) DEVICE — PERISTRIP 60 STAPLE LINE REINFORCEMENT (6EA/CA)

## (undated) DEVICE — SET TUBING PNEUMOCLEAR HIGH FLOW SMOKE EVACUATION (10EA/BX)

## (undated) DEVICE — SODIUM CHL IRRIGATION 0.9% 1000ML (12EA/CA)

## (undated) DEVICE — BLOCK BITE ENDOSCOPIC 2809 - (100/BX) INTERMEDIATE

## (undated) DEVICE — KIT CUSTOM PROCEDURE SINGLE FOR ENDO (15/CA)

## (undated) DEVICE — TROCAR 5X100 NON BLADED Z-TH - READ KII (6/BX)

## (undated) DEVICE — CLIP APPLIER 10MM ENDO LARGE (3EA/BX)

## (undated) DEVICE — SET LEADWIRE 5 LEAD BEDSIDE DISPOSABLE ECG (1SET OF 5/EA)

## (undated) DEVICE — CANNULA W/SEAL 5X100 Z-THRE - ADED KII (12/BX)

## (undated) DEVICE — PACK GASTRIC BANDING OR - (1/CA)

## (undated) DEVICE — STAPLER POWERED 60MM (3EA/BX)

## (undated) DEVICE — TUBING CLEARLINK DUO-VENT - C-FLO (48EA/CA)

## (undated) DEVICE — CHLORAPREP 26 ML APPLICATOR - ORANGE TINT(25/CA)

## (undated) DEVICE — RELOAD WITH GRIPPING SURFACE TECHNOLOGY BLUE 60MM (12EA/BX)

## (undated) DEVICE — GLOVE BIOGEL PI INDICATOR SZ 7.0 SURGICAL PF LF - (50/BX 4BX/CA)

## (undated) DEVICE — ELECTRODE 850 FOAM ADHESIVE - HYDROGEL RADIOTRNSPRNT (50/PK)

## (undated) DEVICE — GOWN WARMING X-LARGE FLEX - (20/CA)

## (undated) DEVICE — CANISTER SUCTION 3000ML MECHANICAL FILTER AUTO SHUTOFF MEDI-VAC NONSTERILE LF DISP  (40EA/CA)

## (undated) DEVICE — MAT PATIENT POSITIONING PREVALON (10EA/CA)